# Patient Record
Sex: FEMALE | Race: WHITE | NOT HISPANIC OR LATINO | Employment: OTHER | URBAN - NONMETROPOLITAN AREA
[De-identification: names, ages, dates, MRNs, and addresses within clinical notes are randomized per-mention and may not be internally consistent; named-entity substitution may affect disease eponyms.]

---

## 2023-03-09 LAB — COBALAMIN (VITAMIN B12) (PG/ML) IN SER/PLAS: 798 PG/ML (ref 211–911)

## 2023-06-13 LAB
ALANINE AMINOTRANSFERASE (SGPT) (U/L) IN SER/PLAS: 22 U/L (ref 7–45)
ALBUMIN (G/DL) IN SER/PLAS: 4.1 G/DL (ref 3.4–5)
ALKALINE PHOSPHATASE (U/L) IN SER/PLAS: 68 U/L (ref 33–136)
ANION GAP IN SER/PLAS: 13 MMOL/L (ref 10–20)
ASPARTATE AMINOTRANSFERASE (SGOT) (U/L) IN SER/PLAS: 18 U/L (ref 9–39)
BASOPHILS (10*3/UL) IN BLOOD BY AUTOMATED COUNT: 0.08 X10E9/L (ref 0–0.1)
BASOPHILS/100 LEUKOCYTES IN BLOOD BY AUTOMATED COUNT: 1.1 % (ref 0–2)
BILIRUBIN TOTAL (MG/DL) IN SER/PLAS: 1.4 MG/DL (ref 0–1.2)
CALCIUM (MG/DL) IN SER/PLAS: 9.4 MG/DL (ref 8.6–10.3)
CARBON DIOXIDE, TOTAL (MMOL/L) IN SER/PLAS: 25 MMOL/L (ref 21–32)
CHLORIDE (MMOL/L) IN SER/PLAS: 100 MMOL/L (ref 98–107)
CREATININE (MG/DL) IN SER/PLAS: 0.91 MG/DL (ref 0.5–1.05)
EOSINOPHILS (10*3/UL) IN BLOOD BY AUTOMATED COUNT: 0.18 X10E9/L (ref 0–0.4)
EOSINOPHILS/100 LEUKOCYTES IN BLOOD BY AUTOMATED COUNT: 2.6 % (ref 0–6)
ERYTHROCYTE DISTRIBUTION WIDTH (RATIO) BY AUTOMATED COUNT: 12.4 % (ref 11.5–14.5)
ERYTHROCYTE MEAN CORPUSCULAR HEMOGLOBIN CONCENTRATION (G/DL) BY AUTOMATED: 33.3 G/DL (ref 32–36)
ERYTHROCYTE MEAN CORPUSCULAR VOLUME (FL) BY AUTOMATED COUNT: 97 FL (ref 80–100)
ERYTHROCYTES (10*6/UL) IN BLOOD BY AUTOMATED COUNT: 5.09 X10E12/L (ref 4–5.2)
GFR FEMALE: 65 ML/MIN/1.73M2
GLUCOSE (MG/DL) IN SER/PLAS: 131 MG/DL (ref 74–99)
HEMATOCRIT (%) IN BLOOD BY AUTOMATED COUNT: 49.2 % (ref 36–46)
HEMOGLOBIN (G/DL) IN BLOOD: 16.4 G/DL (ref 12–16)
IMMATURE GRANULOCYTES/100 LEUKOCYTES IN BLOOD BY AUTOMATED COUNT: 0.4 % (ref 0–0.9)
LEUKOCYTES (10*3/UL) IN BLOOD BY AUTOMATED COUNT: 7 X10E9/L (ref 4.4–11.3)
LYMPHOCYTES (10*3/UL) IN BLOOD BY AUTOMATED COUNT: 3.02 X10E9/L (ref 0.8–3)
LYMPHOCYTES/100 LEUKOCYTES IN BLOOD BY AUTOMATED COUNT: 43.3 % (ref 13–44)
MONOCYTES (10*3/UL) IN BLOOD BY AUTOMATED COUNT: 0.59 X10E9/L (ref 0.05–0.8)
MONOCYTES/100 LEUKOCYTES IN BLOOD BY AUTOMATED COUNT: 8.5 % (ref 2–10)
NEUTROPHILS (10*3/UL) IN BLOOD BY AUTOMATED COUNT: 3.08 X10E9/L (ref 1.6–5.5)
NEUTROPHILS/100 LEUKOCYTES IN BLOOD BY AUTOMATED COUNT: 44.1 % (ref 40–80)
PLATELETS (10*3/UL) IN BLOOD AUTOMATED COUNT: 260 X10E9/L (ref 150–450)
POTASSIUM (MMOL/L) IN SER/PLAS: 4.2 MMOL/L (ref 3.5–5.3)
PROTEIN TOTAL: 6.8 G/DL (ref 6.4–8.2)
SODIUM (MMOL/L) IN SER/PLAS: 134 MMOL/L (ref 136–145)
UREA NITROGEN (MG/DL) IN SER/PLAS: 15 MG/DL (ref 6–23)

## 2023-07-05 ENCOUNTER — TELEPHONE (OUTPATIENT)
Dept: PRIMARY CARE | Facility: CLINIC | Age: 78
End: 2023-07-05
Payer: MEDICARE

## 2023-07-05 DIAGNOSIS — G62.89 OTHER POLYNEUROPATHY: Primary | ICD-10-CM

## 2023-07-06 PROBLEM — G62.9 PERIPHERAL NEUROPATHY: Status: ACTIVE | Noted: 2023-07-06

## 2023-07-30 DIAGNOSIS — E11.65 TYPE 2 DIABETES MELLITUS WITH HYPERGLYCEMIA (MULTI): ICD-10-CM

## 2023-07-30 DIAGNOSIS — I10 ESSENTIAL (PRIMARY) HYPERTENSION: ICD-10-CM

## 2023-07-31 LAB — URINE CULTURE: ABNORMAL

## 2023-08-01 RX ORDER — METFORMIN HYDROCHLORIDE 500 MG/1
500 TABLET ORAL DAILY
Qty: 90 TABLET | Refills: 1 | Status: SHIPPED | OUTPATIENT
Start: 2023-08-01 | End: 2024-02-05

## 2023-08-01 RX ORDER — LOSARTAN POTASSIUM AND HYDROCHLOROTHIAZIDE 12.5; 5 MG/1; MG/1
1 TABLET ORAL DAILY
Qty: 90 TABLET | Refills: 1 | Status: SHIPPED | OUTPATIENT
Start: 2023-08-01 | End: 2023-08-02 | Stop reason: SDUPTHER

## 2023-08-02 RX ORDER — LOSARTAN POTASSIUM AND HYDROCHLOROTHIAZIDE 12.5; 5 MG/1; MG/1
1 TABLET ORAL DAILY
Qty: 90 TABLET | Refills: 1 | Status: SHIPPED | OUTPATIENT
Start: 2023-08-02 | End: 2024-01-05 | Stop reason: HOSPADM

## 2023-08-11 LAB
ALANINE AMINOTRANSFERASE (SGPT) (U/L) IN SER/PLAS: 23 U/L (ref 7–45)
ALBUMIN (G/DL) IN SER/PLAS: 4.1 G/DL (ref 3.4–5)
ALKALINE PHOSPHATASE (U/L) IN SER/PLAS: 68 U/L (ref 33–136)
ANION GAP IN SER/PLAS: 15 MMOL/L (ref 10–20)
ASPARTATE AMINOTRANSFERASE (SGOT) (U/L) IN SER/PLAS: 21 U/L (ref 9–39)
BASOPHILS (10*3/UL) IN BLOOD BY AUTOMATED COUNT: 0.07 X10E9/L (ref 0–0.1)
BASOPHILS/100 LEUKOCYTES IN BLOOD BY AUTOMATED COUNT: 1 % (ref 0–2)
BILIRUBIN TOTAL (MG/DL) IN SER/PLAS: 1.8 MG/DL (ref 0–1.2)
CALCIUM (MG/DL) IN SER/PLAS: 9.7 MG/DL (ref 8.6–10.3)
CARBON DIOXIDE, TOTAL (MMOL/L) IN SER/PLAS: 27 MMOL/L (ref 21–32)
CHLORIDE (MMOL/L) IN SER/PLAS: 99 MMOL/L (ref 98–107)
CHOLESTEROL (MG/DL) IN SER/PLAS: 225 MG/DL (ref 0–199)
CHOLESTEROL IN HDL (MG/DL) IN SER/PLAS: 69 MG/DL
CHOLESTEROL/HDL RATIO: 3.3
COBALAMIN (VITAMIN B12) (PG/ML) IN SER/PLAS: 996 PG/ML (ref 211–911)
CREATININE (MG/DL) IN SER/PLAS: 0.89 MG/DL (ref 0.5–1.05)
EOSINOPHILS (10*3/UL) IN BLOOD BY AUTOMATED COUNT: 0.17 X10E9/L (ref 0–0.4)
EOSINOPHILS/100 LEUKOCYTES IN BLOOD BY AUTOMATED COUNT: 2.5 % (ref 0–6)
ERYTHROCYTE DISTRIBUTION WIDTH (RATIO) BY AUTOMATED COUNT: 12.6 % (ref 11.5–14.5)
ERYTHROCYTE MEAN CORPUSCULAR HEMOGLOBIN CONCENTRATION (G/DL) BY AUTOMATED: 33.1 G/DL (ref 32–36)
ERYTHROCYTE MEAN CORPUSCULAR VOLUME (FL) BY AUTOMATED COUNT: 97 FL (ref 80–100)
ERYTHROCYTES (10*6/UL) IN BLOOD BY AUTOMATED COUNT: 5.25 X10E12/L (ref 4–5.2)
ESTIMATED AVERAGE GLUCOSE FOR HBA1C: 154 MG/DL
GFR FEMALE: 67 ML/MIN/1.73M2
GLUCOSE (MG/DL) IN SER/PLAS: 141 MG/DL (ref 74–99)
HEMATOCRIT (%) IN BLOOD BY AUTOMATED COUNT: 51.1 % (ref 36–46)
HEMOGLOBIN (G/DL) IN BLOOD: 16.9 G/DL (ref 12–16)
HEMOGLOBIN A1C/HEMOGLOBIN TOTAL IN BLOOD: 7 %
IMMATURE GRANULOCYTES/100 LEUKOCYTES IN BLOOD BY AUTOMATED COUNT: 0.3 % (ref 0–0.9)
LDL: 129 MG/DL (ref 0–99)
LEUKOCYTES (10*3/UL) IN BLOOD BY AUTOMATED COUNT: 6.8 X10E9/L (ref 4.4–11.3)
LYMPHOCYTES (10*3/UL) IN BLOOD BY AUTOMATED COUNT: 2.67 X10E9/L (ref 0.8–3)
LYMPHOCYTES/100 LEUKOCYTES IN BLOOD BY AUTOMATED COUNT: 39 % (ref 13–44)
MONOCYTES (10*3/UL) IN BLOOD BY AUTOMATED COUNT: 0.57 X10E9/L (ref 0.05–0.8)
MONOCYTES/100 LEUKOCYTES IN BLOOD BY AUTOMATED COUNT: 8.3 % (ref 2–10)
NEUTROPHILS (10*3/UL) IN BLOOD BY AUTOMATED COUNT: 3.34 X10E9/L (ref 1.6–5.5)
NEUTROPHILS/100 LEUKOCYTES IN BLOOD BY AUTOMATED COUNT: 48.9 % (ref 40–80)
PLATELETS (10*3/UL) IN BLOOD AUTOMATED COUNT: 241 X10E9/L (ref 150–450)
POTASSIUM (MMOL/L) IN SER/PLAS: 4 MMOL/L (ref 3.5–5.3)
PROTEIN TOTAL: 7.3 G/DL (ref 6.4–8.2)
SODIUM (MMOL/L) IN SER/PLAS: 137 MMOL/L (ref 136–145)
THYROTROPIN (MIU/L) IN SER/PLAS BY DETECTION LIMIT <= 0.05 MIU/L: 1.98 MIU/L (ref 0.44–3.98)
TRIGLYCERIDE (MG/DL) IN SER/PLAS: 136 MG/DL (ref 0–149)
UREA NITROGEN (MG/DL) IN SER/PLAS: 15 MG/DL (ref 6–23)
VLDL: 27 MG/DL (ref 0–40)

## 2023-08-17 ENCOUNTER — OFFICE VISIT (OUTPATIENT)
Dept: PRIMARY CARE | Facility: CLINIC | Age: 78
End: 2023-08-17
Payer: MEDICARE

## 2023-08-17 VITALS
DIASTOLIC BLOOD PRESSURE: 80 MMHG | HEIGHT: 62 IN | WEIGHT: 209.1 LBS | SYSTOLIC BLOOD PRESSURE: 130 MMHG | BODY MASS INDEX: 38.48 KG/M2 | HEART RATE: 88 BPM

## 2023-08-17 DIAGNOSIS — R79.89 LOW VITAMIN D LEVEL: ICD-10-CM

## 2023-08-17 DIAGNOSIS — E78.5 DYSLIPIDEMIA: ICD-10-CM

## 2023-08-17 DIAGNOSIS — E11.65 TYPE 2 DIABETES MELLITUS WITH HYPERGLYCEMIA, WITHOUT LONG-TERM CURRENT USE OF INSULIN (MULTI): Primary | ICD-10-CM

## 2023-08-17 DIAGNOSIS — Z28.21 HERPES ZOSTER VACCINATION DECLINED: ICD-10-CM

## 2023-08-17 DIAGNOSIS — I48.0 PAROXYSMAL ATRIAL FIBRILLATION (MULTI): ICD-10-CM

## 2023-08-17 DIAGNOSIS — I10 BENIGN HYPERTENSION: ICD-10-CM

## 2023-08-17 DIAGNOSIS — Z28.21 PNEUMOCOCCAL VACCINATION DECLINED: ICD-10-CM

## 2023-08-17 DIAGNOSIS — E66.01 SEVERE OBESITY (BMI 35.0-39.9) WITH COMORBIDITY (MULTI): ICD-10-CM

## 2023-08-17 PROBLEM — G47.30 SEVERE SLEEP APNEA: Status: ACTIVE | Noted: 2023-08-17

## 2023-08-17 PROBLEM — N39.3 SUI (STRESS URINARY INCONTINENCE, FEMALE): Status: ACTIVE | Noted: 2023-08-17

## 2023-08-17 PROBLEM — H35.379 MACULAR PUCKERING OF RETINA: Status: ACTIVE | Noted: 2023-08-17

## 2023-08-17 PROBLEM — N39.0 RECURRENT UTI: Status: ACTIVE | Noted: 2023-08-17

## 2023-08-17 PROCEDURE — 99214 OFFICE O/P EST MOD 30 MIN: CPT | Performed by: FAMILY MEDICINE

## 2023-08-17 PROCEDURE — 1160F RVW MEDS BY RX/DR IN RCRD: CPT | Performed by: FAMILY MEDICINE

## 2023-08-17 PROCEDURE — 3075F SYST BP GE 130 - 139MM HG: CPT | Performed by: FAMILY MEDICINE

## 2023-08-17 PROCEDURE — 1036F TOBACCO NON-USER: CPT | Performed by: FAMILY MEDICINE

## 2023-08-17 PROCEDURE — 3079F DIAST BP 80-89 MM HG: CPT | Performed by: FAMILY MEDICINE

## 2023-08-17 PROCEDURE — 1159F MED LIST DOCD IN RCRD: CPT | Performed by: FAMILY MEDICINE

## 2023-08-17 RX ORDER — CHOLECALCIFEROL (VITAMIN D3) 25 MCG
1000 TABLET ORAL NIGHTLY
COMMUNITY

## 2023-08-17 RX ORDER — LANOLIN ALCOHOL/MO/W.PET/CERES
1 CREAM (GRAM) TOPICAL DAILY
COMMUNITY

## 2023-08-17 RX ORDER — METOPROLOL TARTRATE 50 MG/1
TABLET ORAL
COMMUNITY
End: 2024-01-02 | Stop reason: SDUPTHER

## 2023-08-17 NOTE — PROGRESS NOTES
Patient presents for periodic surveillance of chronic medical problems.     Subjective  Graciela Norman is a 78 y.o. female who presents for Annual Exam.  HPI  DM, well controlled, A1c is 7  Htn, stable  Hyperlipidemia, stopped her statin, reviewed benefits, she will get back on it  Due for colonoscopy, history of colon cancer, she states she is aware is due, she wants to call the office herself to schedule, asked her to let us know if they require a referarl  Recommend prevnar 20, Recommend Shingrix, declines  Elevated h/h, hem/onc is watching per pt    Review of Systems   All other systems reviewed and are negative.  .    Objective     Visit Vitals  /80 (BP Location: Left arm, Patient Position: Sitting)   Pulse 88      Physical Exam  Vitals and nursing note reviewed.   Constitutional:       General: She is not in acute distress.     Appearance: Normal appearance. She is not toxic-appearing.   HENT:      Head: Normocephalic and atraumatic.   Cardiovascular:      Rate and Rhythm: Normal rate and regular rhythm.      Heart sounds: No murmur heard.  Pulmonary:      Effort: Pulmonary effort is normal.      Breath sounds: Normal breath sounds.   Abdominal:      Palpations: Abdomen is soft.   Musculoskeletal:      Cervical back: Neck supple. No rigidity.      Comments: Normal gait   Skin:     General: Skin is warm and dry.   Neurological:      General: No focal deficit present.      Mental Status: She is alert and oriented to person, place, and time.   Psychiatric:         Mood and Affect: Mood normal.         Behavior: Behavior normal.       Orders Only on 08/11/2023   Component Date Value Ref Range Status    Hemoglobin A1C 08/11/2023 7.0 (A)  % Final    Comment:      Diagnosis of Diabetes-Adults   Non-Diabetic: < or = 5.6%   Increased risk for developing diabetes: 5.7-6.4%   Diagnostic of diabetes: > or = 6.5%  .       Monitoring of Diabetes                Age (y)     Therapeutic Goal (%)   Adults:          >18            <7.0   Pediatrics:    13-18           <7.5                   7-12           <8.0                   0- 6            7.5-8.5   American Diabetes Association. Diabetes Care 33(S1), Jan 2010.      Estimated Average Glucose 08/11/2023 154  MG/DL Final    Cholesterol 08/11/2023 225 (H)  0 - 199 mg/dL Final    Comment: .      AGE      DESIRABLE   BORDERLINE HIGH   HIGH     0-19 Y     0 - 169       170 - 199     >/= 200    20-24 Y     0 - 189       190 - 224     >/= 225         >24 Y     0 - 199       200 - 239     >/= 240   **All ranges are based on fasting samples. Specific   therapeutic targets will vary based on patient-specific   cardiac risk.  .   Pediatric guidelines reference:Pediatrics 2011, 128(S5).   Adult guidelines reference: NCEP ATPIII Guidelines,     DINA 2001, 258:2486-97  .   Venipuncture immediately after or during the    administration of Metamizole may lead to falsely   low results. Testing should be performed immediately   prior to Metamizole dosing.      HDL 08/11/2023 69.0  mg/dL Final    Comment: .      AGE      VERY LOW   LOW     NORMAL    HIGH       0-19 Y       < 35   < 40     40-45     ----    20-24 Y       ----   < 40       >45     ----      >24 Y       ----   < 40     40-60      >60  .      Cholesterol/HDL Ratio 08/11/2023 3.3   Final    Comment: REF VALUES  DESIRABLE  < 3.4  HIGH RISK  > 5.0      LDL 08/11/2023 129 (H)  0 - 99 mg/dL Final    Comment: .                           NEAR      BORD      AGE      DESIRABLE  OPTIMAL    HIGH     HIGH     VERY HIGH     0-19 Y     0 - 109     ---    110-129   >/= 130     ----    20-24 Y     0 - 119     ---    120-159   >/= 160     ----      >24 Y     0 -  99   100-129  130-159   160-189     >/=190  .      VLDL 08/11/2023 27  0 - 40 mg/dL Final    Triglycerides 08/11/2023 136  0 - 149 mg/dL Final    Comment: .      AGE      DESIRABLE   BORDERLINE HIGH   HIGH     VERY HIGH   0 D-90 D    19 - 174         ----         ----        ----  91 D- 9 Y      0 -  74        75 -  99     >/= 100      ----    10-19 Y     0 -  89        90 - 129     >/= 130      ----    20-24 Y     0 - 114       115 - 149     >/= 150      ----         >24 Y     0 - 149       150 - 199    200- 499    >/= 500  .   Venipuncture immediately after or during the    administration of Metamizole may lead to falsely   low results. Testing should be performed immediately   prior to Metamizole dosing.      WBC 08/11/2023 6.8  4.4 - 11.3 x10E9/L Final    RBC 08/11/2023 5.25 (H)  4.00 - 5.20 x10E12/L Final    Hemoglobin 08/11/2023 16.9 (H)  12.0 - 16.0 g/dL Final    Hematocrit 08/11/2023 51.1 (H)  36.0 - 46.0 % Final    MCV 08/11/2023 97  80 - 100 fL Final    MCHC 08/11/2023 33.1  32.0 - 36.0 g/dL Final    Platelets 08/11/2023 241  150 - 450 x10E9/L Final    RDW 08/11/2023 12.6  11.5 - 14.5 % Final    Neutrophils % 08/11/2023 48.9  40.0 - 80.0 % Final    Immature Granulocytes %, Automated 08/11/2023 0.3  0.0 - 0.9 % Final    Comment:  Immature Granulocyte Count (IG) includes promyelocytes,    myelocytes and metamyelocytes but does not include bands.   Percent differential counts (%) should be interpreted in the   context of the absolute cell counts (cells/L).      Lymphocytes % 08/11/2023 39.0  13.0 - 44.0 % Final    Monocytes % 08/11/2023 8.3  2.0 - 10.0 % Final    Eosinophils % 08/11/2023 2.5  0.0 - 6.0 % Final    Basophils % 08/11/2023 1.0  0.0 - 2.0 % Final    Neutrophils Absolute 08/11/2023 3.34  1.60 - 5.50 x10E9/L Final    Comment:  Percent differential counts (%) should be interpreted in the   context of the absolute cell counts (cells/L).      Lymphocytes Absolute 08/11/2023 2.67  0.80 - 3.00 x10E9/L Final    Monocytes Absolute 08/11/2023 0.57  0.05 - 0.80 x10E9/L Final    Eosinophils Absolute 08/11/2023 0.17  0.00 - 0.40 x10E9/L Final    Basophils Absolute 08/11/2023 0.07  0.00 - 0.10 x10E9/L Final    Glucose 08/11/2023 141 (H)  74 - 99 mg/dL Final    Sodium 08/11/2023 137  136 - 145 mmol/L  Final    Potassium 08/11/2023 4.0  3.5 - 5.3 mmol/L Final    Chloride 08/11/2023 99  98 - 107 mmol/L Final    Bicarbonate 08/11/2023 27  21 - 32 mmol/L Final    Anion Gap 08/11/2023 15  10 - 20 mmol/L Final    Urea Nitrogen 08/11/2023 15  6 - 23 mg/dL Final    Creatinine 08/11/2023 0.89  0.50 - 1.05 mg/dL Final    GFR Female 08/11/2023 67  >90 mL/min/1.73m2 Final    Comment:  CALCULATIONS OF ESTIMATED GFR ARE PERFORMED   USING THE 2021 CKD-EPI STUDY REFIT EQUATION   WITHOUT THE RACE VARIABLE FOR THE IDMS-TRACEABLE   CREATININE METHODS.    https://jasn.asnjournals.org/content/early/2021/09/22/ASN.3908960952      Calcium 08/11/2023 9.7  8.6 - 10.3 mg/dL Final    Albumin 08/11/2023 4.1  3.4 - 5.0 g/dL Final    Alkaline Phosphatase 08/11/2023 68  33 - 136 U/L Final    Total Protein 08/11/2023 7.3  6.4 - 8.2 g/dL Final    AST 08/11/2023 21  9 - 39 U/L Final    Total Bilirubin 08/11/2023 1.8 (H)  0.0 - 1.2 mg/dL Final    ALT (SGPT) 08/11/2023 23  7 - 45 U/L Final    Comment:  Patients treated with Sulfasalazine may generate    falsely decreased results for ALT.      TSH 08/11/2023 1.98  0.44 - 3.98 mIU/L Final    Comment:  TSH testing is performed using different testing    methodology at [facility] than at other    [facility] hospitals. Direct result comparisons should    only be made within the same method.      Vitamin B-12 08/11/2023 996 (H)  211 - 911 pg/mL Final   Orders Only on 07/28/2023   Component Date Value Ref Range Status    Urine Culture 07/28/2023 Escherichia coli (A)   Final    >100,000 CFU/ML        Assessment/Plan   Problem List Items Addressed This Visit       Diabetes mellitus with hyperglycemia, without long-term current use of insulin (CMS/[facility]) - Primary    Relevant Orders    Comprehensive Metabolic Panel    Albumin , Urine Random    Urinalysis Microscopic Only    Hemoglobin A1C    Paroxysmal atrial fibrillation (CMS/HCC)    Relevant Medications    metoprolol tartrate (Lopressor) 50 mg  tablet    Severe obesity (BMI 35.0-39.9) with comorbidity (CMS/HCC)    Benign hypertension    Dyslipidemia    Low vitamin D level    Relevant Orders    Vitamin D 25-Hydroxy,Total     Other Visit Diagnoses       BMI 38.0-38.9,adult        Relevant Orders    Vitamin D 25-Hydroxy,Total                   Becky Gusman MD

## 2024-01-02 ENCOUNTER — OFFICE VISIT (OUTPATIENT)
Dept: PRIMARY CARE | Facility: CLINIC | Age: 79
End: 2024-01-02
Payer: MEDICARE

## 2024-01-02 ENCOUNTER — HOSPITAL ENCOUNTER (INPATIENT)
Facility: HOSPITAL | Age: 79
LOS: 3 days | Discharge: HOME | DRG: 309 | End: 2024-01-05
Attending: EMERGENCY MEDICINE | Admitting: INTERNAL MEDICINE
Payer: MEDICARE

## 2024-01-02 ENCOUNTER — APPOINTMENT (OUTPATIENT)
Dept: RADIOLOGY | Facility: HOSPITAL | Age: 79
DRG: 309 | End: 2024-01-02
Payer: MEDICARE

## 2024-01-02 ENCOUNTER — HOSPITAL ENCOUNTER (OUTPATIENT)
Dept: CARDIOLOGY | Facility: HOSPITAL | Age: 79
Discharge: HOME | DRG: 309 | End: 2024-01-02
Payer: MEDICARE

## 2024-01-02 VITALS
BODY MASS INDEX: 39.01 KG/M2 | WEIGHT: 212 LBS | SYSTOLIC BLOOD PRESSURE: 144 MMHG | DIASTOLIC BLOOD PRESSURE: 88 MMHG | HEIGHT: 62 IN | HEART RATE: 88 BPM

## 2024-01-02 DIAGNOSIS — E66.01 SEVERE OBESITY (BMI 35.0-39.9) WITH COMORBIDITY (MULTI): ICD-10-CM

## 2024-01-02 DIAGNOSIS — I48.0 PAROXYSMAL ATRIAL FIBRILLATION (MULTI): ICD-10-CM

## 2024-01-02 DIAGNOSIS — E11.65 TYPE 2 DIABETES MELLITUS WITH HYPERGLYCEMIA, WITHOUT LONG-TERM CURRENT USE OF INSULIN (MULTI): ICD-10-CM

## 2024-01-02 DIAGNOSIS — G62.2 POLYNEUROPATHY DUE TO OTHER TOXIC AGENTS (MULTI): Primary | ICD-10-CM

## 2024-01-02 DIAGNOSIS — I48.91 ATRIAL FIBRILLATION WITH RVR (MULTI): Primary | ICD-10-CM

## 2024-01-02 DIAGNOSIS — I50.20 HEART FAILURE WITH REDUCED EJECTION FRACTION (MULTI): ICD-10-CM

## 2024-01-02 DIAGNOSIS — Z91.199 NONCOMPLIANCE: ICD-10-CM

## 2024-01-02 LAB
ANION GAP SERPL CALC-SCNC: 16 MMOL/L (ref 10–20)
ATRIAL RATE: 170 BPM
BASOPHILS # BLD AUTO: 0.07 X10*3/UL (ref 0–0.1)
BASOPHILS NFR BLD AUTO: 0.9 %
BNP SERPL-MCNC: 823 PG/ML (ref 0–99)
BUN SERPL-MCNC: 14 MG/DL (ref 6–23)
CALCIUM SERPL-MCNC: 9.7 MG/DL (ref 8.6–10.3)
CARDIAC TROPONIN I PNL SERPL HS: 33 NG/L (ref 0–13)
CHLORIDE SERPL-SCNC: 102 MMOL/L (ref 98–107)
CO2 SERPL-SCNC: 23 MMOL/L (ref 21–32)
CREAT SERPL-MCNC: 0.92 MG/DL (ref 0.5–1.05)
EOSINOPHIL # BLD AUTO: 0.06 X10*3/UL (ref 0–0.4)
EOSINOPHIL NFR BLD AUTO: 0.8 %
ERYTHROCYTE [DISTWIDTH] IN BLOOD BY AUTOMATED COUNT: 13 % (ref 11.5–14.5)
GFR SERPL CREATININE-BSD FRML MDRD: 65 ML/MIN/1.73M*2
GLUCOSE BLD MANUAL STRIP-MCNC: 144 MG/DL (ref 74–99)
GLUCOSE BLD MANUAL STRIP-MCNC: 183 MG/DL (ref 74–99)
GLUCOSE SERPL-MCNC: 178 MG/DL (ref 74–99)
HCT VFR BLD AUTO: 49.5 % (ref 36–46)
HGB BLD-MCNC: 15.9 G/DL (ref 12–16)
HOLD SPECIMEN: NORMAL
IMM GRANULOCYTES # BLD AUTO: 0.02 X10*3/UL (ref 0–0.5)
IMM GRANULOCYTES NFR BLD AUTO: 0.3 % (ref 0–0.9)
LYMPHOCYTES # BLD AUTO: 1.93 X10*3/UL (ref 0.8–3)
LYMPHOCYTES NFR BLD AUTO: 25.8 %
MAGNESIUM SERPL-MCNC: 1.81 MG/DL (ref 1.6–2.4)
MCH RBC QN AUTO: 31.5 PG (ref 26–34)
MCHC RBC AUTO-ENTMCNC: 32.1 G/DL (ref 32–36)
MCV RBC AUTO: 98 FL (ref 80–100)
MONOCYTES # BLD AUTO: 0.68 X10*3/UL (ref 0.05–0.8)
MONOCYTES NFR BLD AUTO: 9.1 %
NEUTROPHILS # BLD AUTO: 4.71 X10*3/UL (ref 1.6–5.5)
NEUTROPHILS NFR BLD AUTO: 63.1 %
NRBC BLD-RTO: 0 /100 WBCS (ref 0–0)
PHOSPHATE SERPL-MCNC: 3.3 MG/DL (ref 2.5–4.9)
PLATELET # BLD AUTO: 221 X10*3/UL (ref 150–450)
POTASSIUM SERPL-SCNC: 3.8 MMOL/L (ref 3.5–5.3)
Q ONSET: 228 MS
QRS COUNT: 28 BEATS
QRS DURATION: 72 MS
QT INTERVAL: 228 MS
QTC CALCULATION(BAZETT): 381 MS
QTC FREDERICIA: 321 MS
R AXIS: 44 DEGREES
RBC # BLD AUTO: 5.05 X10*6/UL (ref 4–5.2)
SODIUM SERPL-SCNC: 137 MMOL/L (ref 136–145)
T AXIS: 189 DEGREES
T OFFSET: 342 MS
TSH SERPL-ACNC: 2.03 MIU/L (ref 0.44–3.98)
VENTRICULAR RATE: 168 BPM
WBC # BLD AUTO: 7.5 X10*3/UL (ref 4.4–11.3)

## 2024-01-02 PROCEDURE — 99214 OFFICE O/P EST MOD 30 MIN: CPT | Performed by: FAMILY MEDICINE

## 2024-01-02 PROCEDURE — 1126F AMNT PAIN NOTED NONE PRSNT: CPT | Performed by: FAMILY MEDICINE

## 2024-01-02 PROCEDURE — 2500000005 HC RX 250 GENERAL PHARMACY W/O HCPCS: Performed by: EMERGENCY MEDICINE

## 2024-01-02 PROCEDURE — 2500000004 HC RX 250 GENERAL PHARMACY W/ HCPCS (ALT 636 FOR OP/ED): Performed by: EMERGENCY MEDICINE

## 2024-01-02 PROCEDURE — 3079F DIAST BP 80-89 MM HG: CPT | Performed by: FAMILY MEDICINE

## 2024-01-02 PROCEDURE — 82947 ASSAY GLUCOSE BLOOD QUANT: CPT

## 2024-01-02 PROCEDURE — 3077F SYST BP >= 140 MM HG: CPT | Performed by: FAMILY MEDICINE

## 2024-01-02 PROCEDURE — 2500000001 HC RX 250 WO HCPCS SELF ADMINISTERED DRUGS (ALT 637 FOR MEDICARE OP): Performed by: INTERNAL MEDICINE

## 2024-01-02 PROCEDURE — 93010 ELECTROCARDIOGRAM REPORT: CPT | Performed by: STUDENT IN AN ORGANIZED HEALTH CARE EDUCATION/TRAINING PROGRAM

## 2024-01-02 PROCEDURE — 96374 THER/PROPH/DIAG INJ IV PUSH: CPT

## 2024-01-02 PROCEDURE — 1160F RVW MEDS BY RX/DR IN RCRD: CPT | Performed by: FAMILY MEDICINE

## 2024-01-02 PROCEDURE — 1159F MED LIST DOCD IN RCRD: CPT | Performed by: FAMILY MEDICINE

## 2024-01-02 PROCEDURE — 84443 ASSAY THYROID STIM HORMONE: CPT | Performed by: EMERGENCY MEDICINE

## 2024-01-02 PROCEDURE — 85025 COMPLETE CBC W/AUTO DIFF WBC: CPT | Performed by: EMERGENCY MEDICINE

## 2024-01-02 PROCEDURE — 83735 ASSAY OF MAGNESIUM: CPT | Performed by: EMERGENCY MEDICINE

## 2024-01-02 PROCEDURE — 36415 COLL VENOUS BLD VENIPUNCTURE: CPT | Performed by: EMERGENCY MEDICINE

## 2024-01-02 PROCEDURE — 93005 ELECTROCARDIOGRAM TRACING: CPT

## 2024-01-02 PROCEDURE — 1036F TOBACCO NON-USER: CPT | Performed by: FAMILY MEDICINE

## 2024-01-02 PROCEDURE — 99291 CRITICAL CARE FIRST HOUR: CPT | Mod: 25,27 | Performed by: EMERGENCY MEDICINE

## 2024-01-02 PROCEDURE — 2500000001 HC RX 250 WO HCPCS SELF ADMINISTERED DRUGS (ALT 637 FOR MEDICARE OP): Performed by: EMERGENCY MEDICINE

## 2024-01-02 PROCEDURE — 2500000005 HC RX 250 GENERAL PHARMACY W/O HCPCS

## 2024-01-02 PROCEDURE — 99223 1ST HOSP IP/OBS HIGH 75: CPT | Performed by: INTERNAL MEDICINE

## 2024-01-02 PROCEDURE — 2020000001 HC ICU ROOM DAILY

## 2024-01-02 PROCEDURE — 96376 TX/PRO/DX INJ SAME DRUG ADON: CPT

## 2024-01-02 PROCEDURE — 84100 ASSAY OF PHOSPHORUS: CPT | Performed by: EMERGENCY MEDICINE

## 2024-01-02 PROCEDURE — 84484 ASSAY OF TROPONIN QUANT: CPT | Performed by: EMERGENCY MEDICINE

## 2024-01-02 PROCEDURE — 71045 X-RAY EXAM CHEST 1 VIEW: CPT | Mod: FR

## 2024-01-02 PROCEDURE — 80048 BASIC METABOLIC PNL TOTAL CA: CPT | Performed by: EMERGENCY MEDICINE

## 2024-01-02 PROCEDURE — 83880 ASSAY OF NATRIURETIC PEPTIDE: CPT | Performed by: EMERGENCY MEDICINE

## 2024-01-02 PROCEDURE — 71045 X-RAY EXAM CHEST 1 VIEW: CPT | Mod: FOREIGN READ | Performed by: RADIOLOGY

## 2024-01-02 RX ORDER — DILTIAZEM HYDROCHLORIDE 5 MG/ML
INJECTION INTRAVENOUS
Status: COMPLETED
Start: 2024-01-02 | End: 2024-01-02

## 2024-01-02 RX ORDER — METOPROLOL TARTRATE 25 MG/1
25 TABLET, FILM COATED ORAL 2 TIMES DAILY
Qty: 60 TABLET | Refills: 2 | Status: SHIPPED | OUTPATIENT
Start: 2024-01-02 | End: 2024-04-01

## 2024-01-02 RX ORDER — CHOLECALCIFEROL (VITAMIN D3) 25 MCG
1000 TABLET ORAL NIGHTLY
Status: DISCONTINUED | OUTPATIENT
Start: 2024-01-02 | End: 2024-01-05 | Stop reason: HOSPADM

## 2024-01-02 RX ORDER — LOSARTAN POTASSIUM 50 MG/1
50 TABLET ORAL DAILY
Status: DISCONTINUED | OUTPATIENT
Start: 2024-01-02 | End: 2024-01-04

## 2024-01-02 RX ORDER — INSULIN LISPRO 100 [IU]/ML
0-10 INJECTION, SOLUTION INTRAVENOUS; SUBCUTANEOUS
Status: DISCONTINUED | OUTPATIENT
Start: 2024-01-02 | End: 2024-01-05 | Stop reason: HOSPADM

## 2024-01-02 RX ORDER — DILTIAZEM HYDROCHLORIDE 5 MG/ML
20 INJECTION INTRAVENOUS ONCE
Status: COMPLETED | OUTPATIENT
Start: 2024-01-02 | End: 2024-01-02

## 2024-01-02 RX ORDER — ATORVASTATIN CALCIUM 20 MG/1
20 TABLET, FILM COATED ORAL DAILY
Status: DISCONTINUED | OUTPATIENT
Start: 2024-01-02 | End: 2024-01-05 | Stop reason: HOSPADM

## 2024-01-02 RX ORDER — DEXTROSE MONOHYDRATE 100 MG/ML
0.3 INJECTION, SOLUTION INTRAVENOUS ONCE AS NEEDED
Status: DISCONTINUED | OUTPATIENT
Start: 2024-01-02 | End: 2024-01-05 | Stop reason: HOSPADM

## 2024-01-02 RX ORDER — LANOLIN ALCOHOL/MO/W.PET/CERES
1000 CREAM (GRAM) TOPICAL DAILY
Status: DISCONTINUED | OUTPATIENT
Start: 2024-01-02 | End: 2024-01-05 | Stop reason: HOSPADM

## 2024-01-02 RX ORDER — ACETAMINOPHEN 325 MG/1
1000 TABLET ORAL NIGHTLY
Status: DISCONTINUED | OUTPATIENT
Start: 2024-01-02 | End: 2024-01-05 | Stop reason: HOSPADM

## 2024-01-02 RX ORDER — DILTIAZEM HCL/D5W 125 MG/125
5-15 PLASTIC BAG, INJECTION (ML) INTRAVENOUS CONTINUOUS
Status: DISCONTINUED | OUTPATIENT
Start: 2024-01-02 | End: 2024-01-03

## 2024-01-02 RX ORDER — METOPROLOL TARTRATE 25 MG/1
25 TABLET, FILM COATED ORAL 2 TIMES DAILY
Status: DISCONTINUED | OUTPATIENT
Start: 2024-01-02 | End: 2024-01-05 | Stop reason: HOSPADM

## 2024-01-02 RX ORDER — DEXTROSE 50 % IN WATER (D50W) INTRAVENOUS SYRINGE
25
Status: DISCONTINUED | OUTPATIENT
Start: 2024-01-02 | End: 2024-01-05 | Stop reason: HOSPADM

## 2024-01-02 RX ORDER — HYDROCHLOROTHIAZIDE 12.5 MG/1
12.5 CAPSULE ORAL DAILY
Status: DISCONTINUED | OUTPATIENT
Start: 2024-01-02 | End: 2024-01-05 | Stop reason: HOSPADM

## 2024-01-02 RX ORDER — METOPROLOL TARTRATE 25 MG/1
25 TABLET, FILM COATED ORAL ONCE
Status: COMPLETED | OUTPATIENT
Start: 2024-01-02 | End: 2024-01-02

## 2024-01-02 RX ORDER — METFORMIN HYDROCHLORIDE 500 MG/1
500 TABLET ORAL DAILY
Status: DISCONTINUED | OUTPATIENT
Start: 2024-01-02 | End: 2024-01-05 | Stop reason: HOSPADM

## 2024-01-02 RX ORDER — ACETAMINOPHEN 500 MG
1000 TABLET ORAL NIGHTLY
COMMUNITY

## 2024-01-02 RX ADMIN — DILTIAZEM HYDROCHLORIDE 20 MG: 5 INJECTION INTRAVENOUS at 14:20

## 2024-01-02 RX ADMIN — ACETAMINOPHEN 975 MG: 325 TABLET ORAL at 21:06

## 2024-01-02 RX ADMIN — Medication 5 MG/HR: at 13:08

## 2024-01-02 RX ADMIN — METOPROLOL TARTRATE 25 MG: 25 TABLET, FILM COATED ORAL at 21:05

## 2024-01-02 RX ADMIN — METOPROLOL TARTRATE 25 MG: 25 TABLET, FILM COATED ORAL at 16:19

## 2024-01-02 RX ADMIN — DILTIAZEM HYDROCHLORIDE 20 MG: 5 INJECTION INTRAVENOUS at 13:04

## 2024-01-02 RX ADMIN — Medication 1000 UNITS: at 21:05

## 2024-01-02 RX ADMIN — APIXABAN 5 MG: 5 TABLET, FILM COATED ORAL at 21:05

## 2024-01-02 SDOH — SOCIAL STABILITY: SOCIAL INSECURITY: DO YOU FEEL UNSAFE GOING BACK TO THE PLACE WHERE YOU ARE LIVING?: NO

## 2024-01-02 SDOH — SOCIAL STABILITY: SOCIAL INSECURITY: DOES ANYONE TRY TO KEEP YOU FROM HAVING/CONTACTING OTHER FRIENDS OR DOING THINGS OUTSIDE YOUR HOME?: NO

## 2024-01-02 SDOH — SOCIAL STABILITY: SOCIAL INSECURITY: HAVE YOU HAD THOUGHTS OF HARMING ANYONE ELSE?: NO

## 2024-01-02 SDOH — SOCIAL STABILITY: SOCIAL INSECURITY: ARE YOU OR HAVE YOU BEEN THREATENED OR ABUSED PHYSICALLY, EMOTIONALLY, OR SEXUALLY BY ANYONE?: NO

## 2024-01-02 SDOH — SOCIAL STABILITY: SOCIAL INSECURITY: ABUSE: ADULT

## 2024-01-02 SDOH — SOCIAL STABILITY: SOCIAL INSECURITY: ARE THERE ANY APPARENT SIGNS OF INJURIES/BEHAVIORS THAT COULD BE RELATED TO ABUSE/NEGLECT?: NO

## 2024-01-02 SDOH — SOCIAL STABILITY: SOCIAL INSECURITY: HAS ANYONE EVER THREATENED TO HURT YOUR FAMILY OR YOUR PETS?: NO

## 2024-01-02 SDOH — SOCIAL STABILITY: SOCIAL INSECURITY: DO YOU FEEL ANYONE HAS EXPLOITED OR TAKEN ADVANTAGE OF YOU FINANCIALLY OR OF YOUR PERSONAL PROPERTY?: NO

## 2024-01-02 ASSESSMENT — LIFESTYLE VARIABLES
AUDIT-C TOTAL SCORE: 1
SKIP TO QUESTIONS 9-10: 1
HOW OFTEN DO YOU HAVE A DRINK CONTAINING ALCOHOL: MONTHLY OR LESS
HOW MANY STANDARD DRINKS CONTAINING ALCOHOL DO YOU HAVE ON A TYPICAL DAY: 1 OR 2
HOW OFTEN DO YOU HAVE 6 OR MORE DRINKS ON ONE OCCASION: NEVER
PRESCIPTION_ABUSE_PAST_12_MONTHS: NO
SUBSTANCE_ABUSE_PAST_12_MONTHS: NO
AUDIT-C TOTAL SCORE: 1

## 2024-01-02 ASSESSMENT — ACTIVITIES OF DAILY LIVING (ADL)
GROOMING: INDEPENDENT
HEARING - LEFT EAR: FUNCTIONAL
FEEDING YOURSELF: INDEPENDENT
LACK_OF_TRANSPORTATION: NO
WALKS IN HOME: INDEPENDENT
ADEQUATE_TO_COMPLETE_ADL: YES
HEARING - RIGHT EAR: FUNCTIONAL
PATIENT'S MEMORY ADEQUATE TO SAFELY COMPLETE DAILY ACTIVITIES?: YES
DRESSING YOURSELF: INDEPENDENT
TOILETING: INDEPENDENT
JUDGMENT_ADEQUATE_SAFELY_COMPLETE_DAILY_ACTIVITIES: YES
BATHING: INDEPENDENT

## 2024-01-02 ASSESSMENT — COGNITIVE AND FUNCTIONAL STATUS - GENERAL
PATIENT BASELINE BEDBOUND: NO
MOBILITY SCORE: 24
DAILY ACTIVITIY SCORE: 24

## 2024-01-02 ASSESSMENT — PAIN - FUNCTIONAL ASSESSMENT
PAIN_FUNCTIONAL_ASSESSMENT: 0-10

## 2024-01-02 ASSESSMENT — VISUAL ACUITY: OU: 1

## 2024-01-02 ASSESSMENT — COLUMBIA-SUICIDE SEVERITY RATING SCALE - C-SSRS
6. HAVE YOU EVER DONE ANYTHING, STARTED TO DO ANYTHING, OR PREPARED TO DO ANYTHING TO END YOUR LIFE?: NO
2. HAVE YOU ACTUALLY HAD ANY THOUGHTS OF KILLING YOURSELF?: NO
1. IN THE PAST MONTH, HAVE YOU WISHED YOU WERE DEAD OR WISHED YOU COULD GO TO SLEEP AND NOT WAKE UP?: NO

## 2024-01-02 ASSESSMENT — PAIN SCALES - GENERAL
PAINLEVEL_OUTOF10: 3
PAINLEVEL_OUTOF10: 0 - NO PAIN
PAINLEVEL_OUTOF10: 0 - NO PAIN
PAINLEVEL_OUTOF10: 3

## 2024-01-02 ASSESSMENT — PAIN DESCRIPTION - LOCATION: LOCATION: COCCYX

## 2024-01-02 ASSESSMENT — PATIENT HEALTH QUESTIONNAIRE - PHQ9
1. LITTLE INTEREST OR PLEASURE IN DOING THINGS: NOT AT ALL
SUM OF ALL RESPONSES TO PHQ9 QUESTIONS 1 & 2: 0
2. FEELING DOWN, DEPRESSED OR HOPELESS: NOT AT ALL

## 2024-01-02 NOTE — PROGRESS NOTES
Subjective  Graciela Norman is a 78 y.o. female who presents for Atrial Fibrillation.  Atrial Fibrillation  Past medical history includes atrial fibrillation.     Pt called and stated she hadn't felt well since October when she had a respiratory illness.  She has known a fib, and saw a cardiologist in [place].  At her last visit the cardiologist increased her metoprolol tartrate from 25 bid to 50 bid .  She did not feel well, so she stopped it completely.  At that same time she was also on a study drug that she understood to be like eliquis.  She has since been notified to stop the study drug and go back on eliquis, which she is taking.  Had what she states was a bad respiratory illness in October, and cough persisted for awhile.  She did not notify her cardiologist and no longer wants to drive to [place] to see someone, it is unclear how that care ended up in [place].   A week or two ago, she noticed bilateral leg swelling, so she stopped her hyzaar 50/12.5, and she feels that helped the leg swelling.  She is only currently taking her eliquis and her lipitor, the latter she takes every other day.    She is here today because she hasn't been feeling well.  Denies chest pain, denies dyspnea.  Mild leg swelling that is worse as day goes on, not every day. Denies orthopnea, PND, calf pain or swelling . Denies fevers/chills . She feels her heart racing frequently.  She states she has had anxiety her entire adult life and that is what it feels like.    History of diabetes, not currently taking metformin, recommend she resume. History of chemotherapy induced neuropathy.   Review of Systems   All other systems reviewed and are negative.  .    Objective     Visit Vitals  /88 (BP Location: Left arm, Patient Position: Sitting)   Pulse 88      Physical Exam  Vitals and nursing note reviewed.   Constitutional:       General: She is not in acute distress.     Appearance: Normal appearance. She is not toxic-appearing.    HENT:      Head: Normocephalic and atraumatic.   Cardiovascular:      Rate and Rhythm: Normal rate and regular rhythm.      Heart sounds: No murmur heard.  Pulmonary:      Effort: Pulmonary effort is normal.      Breath sounds: Normal breath sounds.   Musculoskeletal:      Cervical back: Neck supple. No rigidity.      Comments: Normal gait   Skin:     General: Skin is warm and dry.   Neurological:      General: No focal deficit present.      Mental Status: She is alert and oriented to person, place, and time.   Psychiatric:         Mood and Affect: Mood normal.         Behavior: Behavior normal.         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

## 2024-01-02 NOTE — PATIENT INSTRUCTIONS
Please do not stop medications without discussing your providers. Please call concerns.   Resume metoprolol 25 bid, fup in a few weeks.  We will get you into cardiology locally.  Routine followup in February with labs prior.

## 2024-01-02 NOTE — NURSING NOTE
Patient came from Dr. Gusman's office for an outpatient EKG.  Dr. Brock was notified of EKG results and wanted patient to go to the ER for further evaluation. Patient was taken to the emergency via wheel chair.  Dr. Blankenship's office was also contacted to update her on the patient's condition and EKG was also faxed to Dr. Gusman's office.

## 2024-01-02 NOTE — ED PROVIDER NOTES
Heart beating fast, history of atrial fibrillation.  This 78-year-old white female presents to the ED from cardiopulmonary after she was sent there for an EKG.  They noted that the patient was in A-fib with RVR with a heart rate of 160s and brought the patient to the ED for further treatment.  Patient states that she has been bad and weaned herself off of her metoprolol by Thanksgiving and has not been taking it since then she does admit to taking her Eliquis for her atrial fibrillation.  She states that she does feel as though her heart is pounding and has some associated shortness of breath she denies any chest pain symptoms with it.  She states that she is attempting to establish a relationship with a cardiologist.  She states that any type of activity makes her symptoms worse rest helps for the most part.      History provided by:  Patient (RT)   used: No         Physical Exam  Vitals and nursing note reviewed.   Constitutional:       General: She is awake.      Appearance: Normal appearance. She is obese.   HENT:      Head: Normocephalic and atraumatic.      Jaw: There is normal jaw occlusion.      Salivary Glands: Right salivary gland is not diffusely enlarged or tender. Left salivary gland is not diffusely enlarged or tender.      Right Ear: Hearing and external ear normal.      Left Ear: Hearing and external ear normal.      Nose: Nose normal. No congestion or rhinorrhea.      Mouth/Throat:      Lips: Pink.      Mouth: Mucous membranes are moist.      Pharynx: Oropharynx is clear. No oropharyngeal exudate or posterior oropharyngeal erythema.   Eyes:      General: Lids are normal. Vision grossly intact.         Right eye: No discharge.         Left eye: No discharge.      Extraocular Movements: Extraocular movements intact.      Right eye: Normal extraocular motion and no nystagmus.      Left eye: Normal extraocular motion and no nystagmus.      Conjunctiva/sclera: Conjunctivae normal.       Pupils: Pupils are equal, round, and reactive to light.   Cardiovascular:      Rate and Rhythm: Tachycardia present. Rhythm irregularly irregular.      Chest Wall: PMI is not displaced. No thrill.      Pulses: Normal pulses.      Heart sounds: Normal heart sounds. No murmur heard.     No friction rub. No gallop.   Pulmonary:      Effort: Pulmonary effort is normal. No tachypnea, prolonged expiration or respiratory distress.      Breath sounds: No stridor. No wheezing, rhonchi or rales.   Chest:      Chest wall: No tenderness.   Abdominal:      General: Abdomen is flat. Bowel sounds are normal. There is no distension.      Palpations: Abdomen is soft. There is no mass.      Tenderness: There is no abdominal tenderness. There is no guarding or rebound.      Hernia: No hernia is present.   Musculoskeletal:         General: No swelling, tenderness, deformity or signs of injury. Normal range of motion.      Cervical back: Full passive range of motion without pain, normal range of motion and neck supple.      Right lower le+ Edema present.      Left lower le+ Edema present.   Skin:     General: Skin is warm.      Capillary Refill: Capillary refill takes less than 2 seconds.      Coloration: Skin is not jaundiced or pale.      Findings: No bruising, erythema, lesion or rash.   Neurological:      General: No focal deficit present.      Mental Status: She is alert, oriented to person, place, and time and easily aroused.      GCS: GCS eye subscore is 4. GCS verbal subscore is 5. GCS motor subscore is 6.      Cranial Nerves: Cranial nerves 2-12 are intact. No cranial nerve deficit.      Sensory: Sensation is intact. No sensory deficit.      Motor: Motor function is intact. No weakness.      Coordination: Coordination is intact. Coordination normal.      Deep Tendon Reflexes: Reflexes normal.   Psychiatric:         Attention and Perception: Attention and perception normal.         Mood and Affect: Mood and affect  normal.         Speech: Speech normal.         Behavior: Behavior normal. Behavior is cooperative.         Thought Content: Thought content normal.         Judgment: Judgment normal.          Labs Reviewed   CBC WITH AUTO DIFFERENTIAL - Abnormal       Result Value    WBC 7.5      nRBC 0.0      RBC 5.05      Hemoglobin 15.9      Hematocrit 49.5 (*)     MCV 98      MCH 31.5      MCHC 32.1      RDW 13.0      Platelets 221      Neutrophils % 63.1      Immature Granulocytes %, Automated 0.3      Lymphocytes % 25.8      Monocytes % 9.1      Eosinophils % 0.8      Basophils % 0.9      Neutrophils Absolute 4.71      Immature Granulocytes Absolute, Automated 0.02      Lymphocytes Absolute 1.93      Monocytes Absolute 0.68      Eosinophils Absolute 0.06      Basophils Absolute 0.07     BASIC METABOLIC PANEL - Abnormal    Glucose 178 (*)     Sodium 137      Potassium 3.8      Chloride 102      Bicarbonate 23      Anion Gap 16      Urea Nitrogen 14      Creatinine 0.92      eGFR 65      Calcium 9.7     TROPONIN I, HIGH SENSITIVITY - Abnormal    Troponin I, High Sensitivity 33 (*)     Narrative:     Less than 99th percentile of normal range cutoff-  Female and children under 18 years old <14 ng/L; Male <21 ng/L: Negative  Repeat testing should be performed if clinically indicated.     Female and children under 18 years old 14-50 ng/L; Male 21-50 ng/L:  Consistent with possible cardiac damage and possible increased clinical   risk. Serial measurements may help to assess extent of myocardial damage.     >50 ng/L: Consistent with cardiac damage, increased clinical risk and  myocardial infarction. Serial measurements may help assess extent of   myocardial damage.      NOTE: Children less than 1 year old may have higher baseline troponin   levels and results should be interpreted in conjunction with the overall   clinical context.     NOTE: Troponin I testing is performed using a different   testing methodology at [facility]  [place] than at other   system hospitals. Direct result comparisons should only   be made within the same method.   B-TYPE NATRIURETIC PEPTIDE - Abnormal     (*)     Narrative:        <100 pg/mL - Heart failure unlikely  100-299 pg/mL - Intermediate probability of acute heart                  failure exacerbation. Correlate with clinical                  context and patient history.    >=300 pg/mL - Heart Failure likely. Correlate with clinical                  context and patient history.    BNP testing is performed using different testing methodology at [facility] than at other system hospitals. Direct result comparisons should only be made within the same method.      BASIC METABOLIC PANEL - Abnormal    Glucose 137 (*)     Sodium 139      Potassium 3.6      Chloride 107      Bicarbonate 24      Anion Gap 12      Urea Nitrogen 11      Creatinine 0.74      eGFR 84      Calcium 8.5 (*)    VITAMIN D 25-HYDROXY,TOTAL - Abnormal    Vitamin D, 25-Hydroxy, Total 21 (*)     Narrative:     Deficiency:         < 20   ng/ml  Insufficiency:      20-29  ng/ml  Sufficiency:         ng/ml  This assay accurately quantifies the sum of Vitamin D3, 25-Hydroxy and Vitamin D2,25-Hydroxy.   POCT GLUCOSE - Abnormal    POCT Glucose 183 (*)    POCT GLUCOSE - Abnormal    POCT Glucose 144 (*)    POCT GLUCOSE - Abnormal    POCT Glucose 133 (*)    MAGNESIUM - Normal    Magnesium 1.81     PHOSPHORUS - Normal    Phosphorus 3.3     TSH - Normal    Thyroid Stimulating Hormone 2.03      Narrative:     TSH testing is performed using different testing methodology at [facility] than at other [facility]. Direct result comparisons should only be made within the same method.     CBC - Normal    WBC 6.8      nRBC 0.0      RBC 4.26      Hemoglobin 13.5      Hematocrit 41.7      MCV 98      MCH 31.7      MCHC 32.4      RDW 13.1      Platelets 189     POCT FINGERSTICK GLUCOSE   POCT FINGERSTICK GLUCOSE   POCT  FINGERSTICK GLUCOSE        XR chest 1 view   Final Result   Moderate cardiomegaly with mild pulmonary vascular congestion.   Signed by Shawn Ham MD      Transthoracic Echo (TTE) Complete    (Results Pending)        Critical Care    Performed by: Orville Lyons DO  Authorized by: Orville Lyons DO    Critical care provider statement:     Critical care time (minutes):  40    Critical care time was exclusive of:  Separately billable procedures and treating other patients    Critical care was necessary to treat or prevent imminent or life-threatening deterioration of the following conditions: Cardiac dysrhythmia.    Critical care was time spent personally by me on the following activities:  Evaluation of patient's response to treatment, examination of patient, obtaining history from patient or surrogate, ordering and performing treatments and interventions, ordering and review of laboratory studies, ordering and review of radiographic studies, pulse oximetry and re-evaluation of patient's condition    I assumed direction of critical care for this patient from another provider in my specialty: no      Care discussed with: admitting provider         Medical Decision Making  Patient was seen and evaluated due to atrial fibrillation with RVR.  Patient had previously taken herself off of her metoprolol mainly because she did not like how she felt when she took this medication.  Her initial EKG revealed her heart rate to be in the 160s.  She also does complain of some shortness of breath though denied any chest pain she was treated with IV diltiazem bolus and subsequent drip and rebolus another 20 mg after that.  Her lab work revealed a normal CBC.  Her glucose was elevated 178.  Initial troponin was 33.  Her BNP though was high at 823.  Magnesium was normal at 1.81.  Phosphorus normal at 3.3.  TSH is normal at 3.03.  Chest x-ray revealed no acute disease process that was interpreted by myself.  The patient was  then admitted to the ICU for further treatment of her atrial fibrillation her heart rate has been improved to the 100s at admission.    Amount and/or Complexity of Data Reviewed  ECG/medicine tests: independent interpretation performed.     Details: EKG was interpreted by myself at 12:58 PM reveals atrial fibrillation with  bpm with right axis deviation and nonspecific ST-T wave changes.  The QRS duration 70 ms.  The QTc is 440 ms axis is 126 degrees.         Diagnoses as of 01/03/24 1003   Atrial fibrillation with RVR (CMS/[facility])                    Orville Lyons,   01/03/24 1003

## 2024-01-02 NOTE — H&P
History Of Present Illness  Graciela Norman is a 78 y.o. female with a significant past medical history of paroxysmal atrial fibrillation on Eliquis, supposed to be on Lopressor 25 mg twice daily, apparently patient weaned herself off from Lopressor recently, patient also has a significant history of hypertension hyperlipidemia, obesity, non-insulin-dependent diabetes mellitus, vitamin D deficiency today patient was seen by her cardiologist in the office, noticed that the patient is in atrial fibrillation with RVR, requested and sent patient to the emergency room for further evaluation.  In the ER patient is asymptomatic denies any chest pain or palpitations, denies any abdominal pain nausea or vomitings, no fever chills or rigors  Denies any urinary symptoms,.  Workup showed patient in atrial fibrillation with RVR electrolytes within normal limits, patient received 20 mg x2 of Cardizem with no improvement in heart rate, later patient was started on Cardizem drip and admitting patient to ICU for further management.     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    Surgical History  She has a past surgical history that includes Other surgical history (01/27/2020); Other surgical history (01/27/2020); Other surgical history (01/27/2020); Other surgical history (01/27/2020); Other surgical history (01/27/2020); Other surgical history (01/27/2020); Other surgical history (01/27/2020); Other surgical history (01/27/2020); Other surgical history (01/27/2020); and Other surgical history (05/26/2020).     Social History  She reports that she has quit smoking. Her smoking use included cigarettes. She has never used smokeless tobacco. She reports current alcohol use. She reports that she does not use drugs.    Family History  No family history on file.    "  Allergies  Aspirin, Diphenhydramine, Penicillins, Shellfish derived, and Sulfa (sulfonamide antibiotics)    Review of Systems  All 10 point review of systems reviewed pertinent positives in H&P.    Physical Exam  General: No distress  Neck: Supple  HEENT: Normocephalic atraumatic pupils equal react light.  Heart: Tachycardic irregular, no murmurs  Lungs: Clear to auscultation bilaterally.  Abdomen: Nondistended nontender bowel sounds are present  Neuro: No focal deficits    Last Recorded Vitals  Blood pressure (!) 132/108, pulse (!) 118, temperature 35.9 °C (96.7 °F), resp. rate 18, height 1.575 m (5' 2\"), weight 96.2 kg (212 lb), SpO2 93 %.    Relevant Results    Scheduled medications  metoprolol tartrate, 25 mg, oral, Once      Continuous medications  dilTIAZem, 5-15 mg/hr, Last Rate: 5 mg/hr (01/02/24 1308)      PRN medications  Results for orders placed or performed during the hospital encounter of 01/02/24 (from the past 24 hour(s))   CBC and Auto Differential   Result Value Ref Range    WBC 7.5 4.4 - 11.3 x10*3/uL    nRBC 0.0 0.0 - 0.0 /100 WBCs    RBC 5.05 4.00 - 5.20 x10*6/uL    Hemoglobin 15.9 12.0 - 16.0 g/dL    Hematocrit 49.5 (H) 36.0 - 46.0 %    MCV 98 80 - 100 fL    MCH 31.5 26.0 - 34.0 pg    MCHC 32.1 32.0 - 36.0 g/dL    RDW 13.0 11.5 - 14.5 %    Platelets 221 150 - 450 x10*3/uL    Neutrophils % 63.1 40.0 - 80.0 %    Immature Granulocytes %, Automated 0.3 0.0 - 0.9 %    Lymphocytes % 25.8 13.0 - 44.0 %    Monocytes % 9.1 2.0 - 10.0 %    Eosinophils % 0.8 0.0 - 6.0 %    Basophils % 0.9 0.0 - 2.0 %    Neutrophils Absolute 4.71 1.60 - 5.50 x10*3/uL    Immature Granulocytes Absolute, Automated 0.02 0.00 - 0.50 x10*3/uL    Lymphocytes Absolute 1.93 0.80 - 3.00 x10*3/uL    Monocytes Absolute 0.68 0.05 - 0.80 x10*3/uL    Eosinophils Absolute 0.06 0.00 - 0.40 x10*3/uL    Basophils Absolute 0.07 0.00 - 0.10 x10*3/uL   Magnesium   Result Value Ref Range    Magnesium 1.81 1.60 - 2.40 mg/dL   Phosphorus "   Result Value Ref Range    Phosphorus 3.3 2.5 - 4.9 mg/dL   Basic metabolic panel   Result Value Ref Range    Glucose 178 (H) 74 - 99 mg/dL    Sodium 137 136 - 145 mmol/L    Potassium 3.8 3.5 - 5.3 mmol/L    Chloride 102 98 - 107 mmol/L    Bicarbonate 23 21 - 32 mmol/L    Anion Gap 16 10 - 20 mmol/L    Urea Nitrogen 14 6 - 23 mg/dL    Creatinine 0.92 0.50 - 1.05 mg/dL    eGFR 65 >60 mL/min/1.73m*2    Calcium 9.7 8.6 - 10.3 mg/dL   Troponin I, High Sensitivity   Result Value Ref Range    Troponin I, High Sensitivity 33 (H) 0 - 13 ng/L   Thyroid Stimulating Hormone   Result Value Ref Range    Thyroid Stimulating Hormone 2.03 0.44 - 3.98 mIU/L   B-type natriuretic peptide   Result Value Ref Range     (H) 0 - 99 pg/mL     ECG 12 lead (Ancillary Performed)    Result Date: 1/2/2024  Atrial fibrillation with rapid ventricular response Nonspecific ST and T wave abnormality Abnormal ECG When compared with ECG of 02-JAN-2024 12:22, (unconfirmed) No significant change was found Confirmed by Markos Brock ([id number]) on 1/2/2024 1:07:09 PM        Assessment/Plan   Atrial fibrillation with RVR.    History of:  Paroxysmal atrial fibrillation on Eliquis  weaned herself off Lopressor recently.  Hypertension  Hyperlipidemia  Non-insulin-dependent diabetes mellitus  Obesity  Vitamin D deficiency    Plan:  Patient was sent to the emergency room from physician's office due to atrial fibrillation with RVR.  Patient has a history of atrial fibrillation, weaned herself off from Lopressor.  Received 2 x 20 mg Cardizem, no improvement in heart rate.  Started on Cardizem drip.  Continuing anticoagulation with Eliquis 5 mg twice daily.  Echocardiogram ordered  Cardiology consult placed.  Cycling troponins.  EKG ordered for tomorrow.  Follow-up CBC BMP.  PT OT.    DVT prophylaxis:  Patient is on Eliquis.    Disposition:  Possible discharge home in next 1 to 2 days.        I spent 35 minutes in the professional and overall care of this  patient.      Andrea Grossman MD

## 2024-01-02 NOTE — PROGRESS NOTES
Having episodes of a-fib; states no longer taking her metoprolol; stopped on her own; no longer seeing cardiologist due to him being in [place]

## 2024-01-03 ENCOUNTER — APPOINTMENT (OUTPATIENT)
Dept: CARDIOLOGY | Facility: HOSPITAL | Age: 79
DRG: 309 | End: 2024-01-03
Payer: MEDICARE

## 2024-01-03 LAB
25(OH)D3 SERPL-MCNC: 21 NG/ML (ref 30–100)
ANION GAP SERPL CALC-SCNC: 12 MMOL/L (ref 10–20)
AORTIC VALVE MEAN GRADIENT: 6
AORTIC VALVE PEAK VELOCITY: 1.66
AV PEAK GRADIENT: 11
AVA (PEAK VEL): 1.34
AVA (VTI): 1.01
BUN SERPL-MCNC: 11 MG/DL (ref 6–23)
CALCIUM SERPL-MCNC: 8.5 MG/DL (ref 8.6–10.3)
CHLORIDE SERPL-SCNC: 107 MMOL/L (ref 98–107)
CO2 SERPL-SCNC: 24 MMOL/L (ref 21–32)
CREAT SERPL-MCNC: 0.74 MG/DL (ref 0.5–1.05)
EJECTION FRACTION APICAL 4 CHAMBER: 45.4
EJECTION FRACTION: 46
ERYTHROCYTE [DISTWIDTH] IN BLOOD BY AUTOMATED COUNT: 13.1 % (ref 11.5–14.5)
GFR SERPL CREATININE-BSD FRML MDRD: 84 ML/MIN/1.73M*2
GLUCOSE BLD MANUAL STRIP-MCNC: 125 MG/DL (ref 74–99)
GLUCOSE BLD MANUAL STRIP-MCNC: 133 MG/DL (ref 74–99)
GLUCOSE BLD MANUAL STRIP-MCNC: 148 MG/DL (ref 74–99)
GLUCOSE BLD MANUAL STRIP-MCNC: 156 MG/DL (ref 74–99)
GLUCOSE SERPL-MCNC: 137 MG/DL (ref 74–99)
HCT VFR BLD AUTO: 41.7 % (ref 36–46)
HGB BLD-MCNC: 13.5 G/DL (ref 12–16)
HOLD SPECIMEN: NORMAL
LEFT ATRIUM VOLUME AREA LENGTH INDEX BSA: 51.9
LEFT VENTRICLE INTERNAL DIMENSION DIASTOLE: 3.59 (ref 3.5–6)
LEFT VENTRICULAR OUTFLOW TRACT DIAMETER: 2
MCH RBC QN AUTO: 31.7 PG (ref 26–34)
MCHC RBC AUTO-ENTMCNC: 32.4 G/DL (ref 32–36)
MCV RBC AUTO: 98 FL (ref 80–100)
NRBC BLD-RTO: 0 /100 WBCS (ref 0–0)
PLATELET # BLD AUTO: 189 X10*3/UL (ref 150–450)
POTASSIUM SERPL-SCNC: 3.6 MMOL/L (ref 3.5–5.3)
RBC # BLD AUTO: 4.26 X10*6/UL (ref 4–5.2)
RIGHT VENTRICLE PEAK SYSTOLIC PRESSURE: 34.8
SODIUM SERPL-SCNC: 139 MMOL/L (ref 136–145)
TRICUSPID ANNULAR PLANE SYSTOLIC EXCURSION: 1.5
WBC # BLD AUTO: 6.8 X10*3/UL (ref 4.4–11.3)

## 2024-01-03 PROCEDURE — 93005 ELECTROCARDIOGRAM TRACING: CPT

## 2024-01-03 PROCEDURE — 2500000002 HC RX 250 W HCPCS SELF ADMINISTERED DRUGS (ALT 637 FOR MEDICARE OP, ALT 636 FOR OP/ED): Performed by: INTERNAL MEDICINE

## 2024-01-03 PROCEDURE — 2020000001 HC ICU ROOM DAILY

## 2024-01-03 PROCEDURE — 2500000004 HC RX 250 GENERAL PHARMACY W/ HCPCS (ALT 636 FOR OP/ED)

## 2024-01-03 PROCEDURE — 99222 1ST HOSP IP/OBS MODERATE 55: CPT

## 2024-01-03 PROCEDURE — 2500000001 HC RX 250 WO HCPCS SELF ADMINISTERED DRUGS (ALT 637 FOR MEDICARE OP): Performed by: INTERNAL MEDICINE

## 2024-01-03 PROCEDURE — 85027 COMPLETE CBC AUTOMATED: CPT | Performed by: INTERNAL MEDICINE

## 2024-01-03 PROCEDURE — 82947 ASSAY GLUCOSE BLOOD QUANT: CPT

## 2024-01-03 PROCEDURE — 82306 VITAMIN D 25 HYDROXY: CPT | Performed by: INTERNAL MEDICINE

## 2024-01-03 PROCEDURE — 36415 COLL VENOUS BLD VENIPUNCTURE: CPT | Performed by: INTERNAL MEDICINE

## 2024-01-03 PROCEDURE — 2500000001 HC RX 250 WO HCPCS SELF ADMINISTERED DRUGS (ALT 637 FOR MEDICARE OP)

## 2024-01-03 PROCEDURE — 2500000005 HC RX 250 GENERAL PHARMACY W/O HCPCS: Performed by: INTERNAL MEDICINE

## 2024-01-03 PROCEDURE — 80048 BASIC METABOLIC PNL TOTAL CA: CPT | Performed by: INTERNAL MEDICINE

## 2024-01-03 PROCEDURE — 93306 TTE W/DOPPLER COMPLETE: CPT | Performed by: STUDENT IN AN ORGANIZED HEALTH CARE EDUCATION/TRAINING PROGRAM

## 2024-01-03 PROCEDURE — 99233 SBSQ HOSP IP/OBS HIGH 50: CPT | Performed by: INTERNAL MEDICINE

## 2024-01-03 PROCEDURE — 93306 TTE W/DOPPLER COMPLETE: CPT

## 2024-01-03 PROCEDURE — 2500000004 HC RX 250 GENERAL PHARMACY W/ HCPCS (ALT 636 FOR OP/ED): Performed by: INTERNAL MEDICINE

## 2024-01-03 PROCEDURE — 94762 N-INVAS EAR/PLS OXIMTRY CONT: CPT

## 2024-01-03 RX ORDER — DILTIAZEM HYDROCHLORIDE 60 MG/1
60 TABLET, FILM COATED ORAL EVERY 6 HOURS
Status: DISCONTINUED | OUTPATIENT
Start: 2024-01-03 | End: 2024-01-05 | Stop reason: HOSPADM

## 2024-01-03 RX ADMIN — Medication 1 L/MIN: at 04:15

## 2024-01-03 RX ADMIN — DILTIAZEM HYDROCHLORIDE 60 MG: 60 TABLET ORAL at 17:07

## 2024-01-03 RX ADMIN — Medication 10 MG/HR: at 09:56

## 2024-01-03 RX ADMIN — METFORMIN HYDROCHLORIDE 500 MG: 500 TABLET ORAL at 09:33

## 2024-01-03 RX ADMIN — Medication 1000 UNITS: at 20:30

## 2024-01-03 RX ADMIN — LOSARTAN POTASSIUM 50 MG: 50 TABLET, FILM COATED ORAL at 09:32

## 2024-01-03 RX ADMIN — METOPROLOL TARTRATE 25 MG: 25 TABLET, FILM COATED ORAL at 20:30

## 2024-01-03 RX ADMIN — PERFLUTREN 1 ML OF DILUTION: 6.52 INJECTION, SUSPENSION INTRAVENOUS at 13:18

## 2024-01-03 RX ADMIN — HYDROCHLOROTHIAZIDE 12.5 MG: 12.5 CAPSULE ORAL at 09:32

## 2024-01-03 RX ADMIN — METOPROLOL TARTRATE 25 MG: 25 TABLET, FILM COATED ORAL at 09:32

## 2024-01-03 RX ADMIN — DILTIAZEM HYDROCHLORIDE 60 MG: 60 TABLET ORAL at 22:06

## 2024-01-03 RX ADMIN — DILTIAZEM HYDROCHLORIDE 60 MG: 60 TABLET ORAL at 10:52

## 2024-01-03 RX ADMIN — APIXABAN 5 MG: 5 TABLET, FILM COATED ORAL at 20:30

## 2024-01-03 RX ADMIN — ACETAMINOPHEN 975 MG: 325 TABLET ORAL at 20:30

## 2024-01-03 RX ADMIN — APIXABAN 5 MG: 5 TABLET, FILM COATED ORAL at 09:33

## 2024-01-03 ASSESSMENT — PAIN - FUNCTIONAL ASSESSMENT
PAIN_FUNCTIONAL_ASSESSMENT: 0-10

## 2024-01-03 ASSESSMENT — PAIN SCALES - GENERAL
PAINLEVEL_OUTOF10: 0 - NO PAIN
PAINLEVEL_OUTOF10: 0 - NO PAIN
PAINLEVEL_OUTOF10: 1
PAINLEVEL_OUTOF10: 1
PAINLEVEL_OUTOF10: 0 - NO PAIN
PAINLEVEL_OUTOF10: 0 - NO PAIN

## 2024-01-03 ASSESSMENT — ACTIVITIES OF DAILY LIVING (ADL): LACK_OF_TRANSPORTATION: NO

## 2024-01-03 NOTE — CARE PLAN
Problem: Pain - Adult  Goal: Verbalizes/displays adequate comfort level or baseline comfort level  Outcome: Progressing     Problem: Safety - Adult  Goal: Free from fall injury  Outcome: Progressing     Problem: Discharge Planning  Goal: Discharge to home or other facility with appropriate resources  Outcome: Progressing     Problem: Chronic Conditions and Co-morbidities  Goal: Patient's chronic conditions and co-morbidity symptoms are monitored and maintained or improved  Outcome: Progressing     Problem: Diabetes  Goal: Achieve decreasing blood glucose levels by end of shift  Outcome: Progressing  Goal: Increase stability of blood glucose readings by end of shift  Outcome: Progressing  Goal: Decrease in ketones present in urine by end of shift  Outcome: Progressing  Goal: Maintain electrolyte levels within acceptable range throughout shift  Outcome: Progressing  Goal: Maintain glucose levels >70mg/dl to <250mg/dl throughout shift  Outcome: Progressing  Goal: No changes in neurological exam by end of shift  Outcome: Progressing  Goal: Learn about and adhere to nutrition recommendations by end of shift  Outcome: Progressing  Goal: Vital signs within normal range for age by end of shift  Outcome: Progressing  Goal: Increase self care and/or family involovement by end of shift  Outcome: Progressing  Goal: Receive DSME education by end of shift  Outcome: Progressing     Problem: Pain  Goal: My pain/discomfort is manageable  Outcome: Progressing     Problem: Safety  Goal: Patient will be injury free during hospitalization  Outcome: Progressing  Goal: I will remain free of falls  Outcome: Progressing     Problem: Daily Care  Goal: Daily care needs are met  Outcome: Progressing     Problem: Psychosocial Needs  Goal: Demonstrates ability to cope with hospitalization/illness  Outcome: Progressing  Goal: Collaborate with me, my family, and caregiver to identify my specific goals  Outcome: Progressing     Problem: Discharge  Barriers  Goal: My discharge needs are met  Outcome: Progressing     Problem: Respiratory  Goal: Clear secretions with interventions this shift  Outcome: Progressing  Goal: Minimize anxiety/maximize coping throughout shift  Outcome: Progressing  Goal: Minimal/no exertional discomfort or dyspnea this shift  Outcome: Progressing  Goal: No signs of respiratory distress (eg. Use of accessory muscles. Peds grunting)  Outcome: Progressing  Goal: Patent airway maintained this shift  Outcome: Progressing  Goal: Tolerate mechanical ventilation evidenced by VS/agitation level this shift  Outcome: Progressing  Goal: Tolerate pulmonary toileting this shift  Outcome: Progressing  Goal: Verbalize decreased shortness of breath this shift  Outcome: Progressing  Goal: Wean oxygen to maintain O2 saturation per order/standard this shift  Outcome: Progressing  Goal: Increase self care and/or family involvement in next 24 hours  Outcome: Progressing     Problem: Arrythmia/Dysrhythmia  Goal: Lab values return to normal range  Outcome: Progressing  Goal: No evidence of post procedure complications  Outcome: Progressing  Goal: Promote self management  Outcome: Progressing  Goal: Serial ECG will return to baseline  Outcome: Progressing  Goal: Verbalize understanding of procedures/devices  Outcome: Progressing  Goal: Vital signs return to baseline  Outcome: Progressing  Goal: Care and maintenance of device (specify)  Outcome: Progressing   The patient's goals for the shift include      The clinical goals for the shift include hr under 100    Pt remains on cardizem drip this shift at 5 ml/hr. Taking PO metoprolol when due. Afib on tele with HR ranging from 's primarily.

## 2024-01-03 NOTE — CONSULTS
Inpatient consult to Cardiology  Consult performed by: Chandrika Sanchez, APRN-CNP, DNP  Consult ordered by: Andrea MARSHALL MD        History Of Present Illness:    Graciela Norman is a 78 y.o. female who had an outpatient EKG done and noted to be in Afib with RVR and instructed to go to the ER. Upon arrival, patient's heart rate was 166 and hypertensive. Patient was given Cardizem 20mg x 2 in the ER with no improvement in her heart rate and ultimately started on a Cardizem drip. Pertinent labs include elevated blood glucose, troponin 33, . Cardiology consulted secondary to Afib with RVR.    Patient examined at bedside. She has known paroxysmal atrial fibrillation (dx in 2016) and reports weaning herself off of the Lopressor due to adverse effects of fatigue, rapid heart rate, and flushing. She does report compliance with Eliquis. Patient previously was under Card[facility] at [facility] with last visit in April, 2023. She reports no longer seeing the doctor due to difference in opinions. Patient reports she has not felt well since October and reports occasional fluttering in her chest. She denies any chest pain/pressure/discomfort, SOB, or dizziness. She reports lower extremity edema.     Previous cardiac testing:  Stress test (12/2021)-negative for ischemia  Echo (1/2021)-preserved biventricular function      Last Recorded Vitals:  Vitals:    01/03/24 0615 01/03/24 0700 01/03/24 0800 01/03/24 0900   BP:  122/72 (!) 145/122 (!) 154/97   Pulse:  107 (!) 115 109   Resp:  (!) 30 23 25   Temp:  36.9 °C (98.4 °F)     TempSrc:  Temporal     SpO2: 98% 98%     Weight:       Height:           Last Labs:  CBC - 1/3/2024:  5:52 AM  6.8 13.5 189    41.7      CMP - 1/3/2024:  5:52 AM  8.5 7.3 21 --- 1.8   3.3 4.1 23 68      PTT - No results in last year.  _   _ _     Troponin I, High Sensitivity   Date/Time Value Ref Range Status   01/02/2024 01:11 PM 33 (H) 0 - 13 ng/L Final     BNP   Date/Time Value Ref Range Status    01/02/2024 01:11  (H) 0 - 99 pg/mL Final   01/02/2021 06:27  (H) 0 - 99 pg/mL Final     Comment:     .  <100 pg/mL - Heart failure unlikely  100-299 pg/mL - Intermediate probability of acute heart  .               failure exacerbation. Correlate with clinical  .               context and patient history.    >=300 pg/mL - Heart Failure likely. Correlate with clinical  .               context and patient history.  BNP testing is performed using different testing   methodology at [facility] than at other   [facility] hospitals. Direct result comparisons should   only be made within the same method.       Hemoglobin A1C   Date/Time Value Ref Range Status   08/11/2023 11:08 AM 7.0 (A) % Final     Comment:          Diagnosis of Diabetes-Adults   Non-Diabetic: < or = 5.6%   Increased risk for developing diabetes: 5.7-6.4%   Diagnostic of diabetes: > or = 6.5%  .       Monitoring of Diabetes                Age (y)     Therapeutic Goal (%)   Adults:          >18           <7.0   Pediatrics:    13-18           <7.5                   7-12           <8.0                   0- 6            7.5-8.5   American Diabetes Association. Diabetes Care 33(S1), Jan 2010.     02/07/2023 11:50 AM 6.7 (A) % Final     Comment:          Diagnosis of Diabetes-Adults   Non-Diabetic: < or = 5.6%   Increased risk for developing diabetes: 5.7-6.4%   Diagnostic of diabetes: > or = 6.5%  .       Monitoring of Diabetes                Age (y)     Therapeutic Goal (%)   Adults:          >18           <7.0   Pediatrics:    13-18           <7.5                   7-12           <8.0                   0- 6            7.5-8.5   American Diabetes Association. Diabetes Care 33(S1), Jan 2010.       VLDL   Date/Time Value Ref Range Status   08/11/2023 12:00 PM 27 0 - 40 mg/dL Final   08/01/2022 12:08 PM 24 0 - 40 mg/dL Final   05/18/2021 11:14 AM 28 0 - 40 mg/dL Final      Last I/O:  No intake/output data recorded.    Past Cardiology Tests  "(Last 3 Years):  EKG:  ECG 12 Lead 01/03/2024 (Preliminary)      ECG 12 lead (Ancillary Performed) 01/02/2024    Echo:  No results found for this or any previous visit from the past 1095 days.    Ejection Fractions:  No results found for: \"EF\"  Cath:  No results found for this or any previous visit from the past 1095 days.    Stress Test:  No results found for this or any previous visit from the past 1095 days.    Cardiac Imaging:  No results found for this or any previous visit from the past 1095 days.      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    Past Surgical History:  She has a past surgical history that includes Other surgical history (01/27/2020); Other surgical history (01/27/2020); Other surgical history (01/27/2020); Other surgical history (01/27/2020); Other surgical history (01/27/2020); Other surgical history (01/27/2020); Other surgical history (01/27/2020); Other surgical history (01/27/2020); Other surgical history (01/27/2020); and Other surgical history (05/26/2020).      Social History:  She reports that she has quit smoking. Her smoking use included cigarettes. She has never used smokeless tobacco. She reports current alcohol use. She reports that she does not use drugs.    Family History:  No family history on file.     Allergies:  Aspirin, Diphenhydramine, Penicillins, Shellfish derived, and Sulfa (sulfonamide antibiotics)    Inpatient Medications:  Scheduled medications   Medication Dose Route Frequency    acetaminophen  975 mg oral Nightly    apixaban  5 mg oral BID    atorvastatin  20 mg oral Daily    cholecalciferol  1,000 Units oral Nightly    cyanocobalamin  1,000 mcg oral Daily    hydroCHLOROthiazide  12.5 mg oral Daily    insulin lispro  0-10 Units subcutaneous TID with meals    losartan  50 mg oral Daily    metFORMIN  500 mg " oral Daily    metoprolol tartrate  25 mg oral BID     PRN medications   Medication    dextrose 10 % in water (D10W)    dextrose    glucagon    oxygen     Continuous Medications   Medication Dose Last Rate    dilTIAZem  5-15 mg/hr 10 mg/hr (01/03/24 0931)     Outpatient Medications:  Current Outpatient Medications   Medication Instructions    acetaminophen (TYLENOL) 1,000 mg, oral, Nightly    apixaban (ELIQUIS) 5 mg, oral, 2 times daily, States takes 2 in am and 1 at night    atorvastatin (LIPITOR) 20 mg, oral, Daily    cholecalciferol (VITAMIN D-3) 1,000 Units, oral, Nightly    cyanocobalamin (Vitamin B-12) 1,000 mcg tablet 1 tablet, oral, Daily    losartan-hydrochlorothiazide (Hyzaar) 50-12.5 mg tablet 1 tablet, oral, Daily    metFORMIN (GLUCOPHAGE) 500 mg, oral, Daily    metoprolol tartrate (LOPRESSOR) 25 mg, oral, 2 times daily       Physical Exam:  General: awake, alert and oriented. No acute distress.   Skin: Skin is warm, dry and intact without rashes or lesions.  HEENT: normocephalic, atraumatic; conjunctivae are clear without exudates or hemorrhage. Sclera is non-icteric. Eyelids are normal in appearance without swelling or lesions. Hearing intact. Nares are patent bilaterally. Moist mucous membranes.   Cardiovascular: irregularly irregular and fast  Respiratory: bilateral lung sounds clear to auscultations without rales, rhonchi, or wheezes. No accessory muscle use or stridor  Gastrointestinal: non-distended, non-tender  Genitourinary: exam deferred  Musculoskeletal: ROM intact, no deformities  Extremities: pulses palpable bilaterally; no swelling or erythema  Neurological: no focal deficits  Psychiatric: appropriate mood and affect; good judgment and insight         Assessment/Plan     Atrial fibrillation with RVR:  -Current resting HR in the 130s; will transition to oral Cardizem at 60mg every six hours and see how she responds. Patient was restarted on Lopressor 25mg twice daily and reports no adverse  reactions at this time  -[id number] Score of 4; continue Eliquis 5mg twice daily; no overt bleeding   -Will repeat echo and compare to previous results in 2021  -Will order a 14 day monitor at discharge   -Recommend patient establish care with us in the outpatient setting     HTN:  -Patient has hx of HTN and remains hypertensive at this time. Will hold off on adjusting other medications and see how she responds to the oral diltiazem.     Peripheral IV 01/02/24 20 G Right Antecubital (Active)   Site Assessment Clean;Dry;Intact 01/03/24 0600   Dressing Type Transparent 01/03/24 0600   Line Status Infusing 01/03/24 0600   Dressing Status Clean;Dry 01/03/24 0600   Number of days: 1       Code Status:  Full Code  Thank you for allowing me to participate in the care of this patient. Please reach me out if you have any questions or if you need any clarifications regarding the patient's care.            Chandrika Sanchez, APRN-CNP, DNP

## 2024-01-03 NOTE — PROGRESS NOTES
Subjective   Patient is sitting on the bed comfortably, denies any chest pain shortness of breath or palpitations  No other significant overnight issues.    Objective     Intake/Output last 3 shifts:  No intake/output data recorded.    Intake/Output this shift:  I/O this shift:  In: 415.9 [P.O.:300; I.V.:115.9]  Out: -     Recent Results (from the past 48 hour(s))   ECG 12 lead (Ancillary Performed)    Collection Time: 01/02/24 12:15 PM   Result Value Ref Range    Ventricular Rate 168 BPM    Atrial Rate 170 BPM    QRS Duration 72 ms    QT Interval 228 ms    QTC Calculation(Bazett) 381 ms    R Axis 44 degrees    T Axis 189 degrees    QRS Count 28 beats    Q Onset 228 ms    T Offset 342 ms    QTC Fredericia 321 ms   CBC and Auto Differential    Collection Time: 01/02/24  1:11 PM   Result Value Ref Range    WBC 7.5 4.4 - 11.3 x10*3/uL    nRBC 0.0 0.0 - 0.0 /100 WBCs    RBC 5.05 4.00 - 5.20 x10*6/uL    Hemoglobin 15.9 12.0 - 16.0 g/dL    Hematocrit 49.5 (H) 36.0 - 46.0 %    MCV 98 80 - 100 fL    MCH 31.5 26.0 - 34.0 pg    MCHC 32.1 32.0 - 36.0 g/dL    RDW 13.0 11.5 - 14.5 %    Platelets 221 150 - 450 x10*3/uL    Neutrophils % 63.1 40.0 - 80.0 %    Immature Granulocytes %, Automated 0.3 0.0 - 0.9 %    Lymphocytes % 25.8 13.0 - 44.0 %    Monocytes % 9.1 2.0 - 10.0 %    Eosinophils % 0.8 0.0 - 6.0 %    Basophils % 0.9 0.0 - 2.0 %    Neutrophils Absolute 4.71 1.60 - 5.50 x10*3/uL    Immature Granulocytes Absolute, Automated 0.02 0.00 - 0.50 x10*3/uL    Lymphocytes Absolute 1.93 0.80 - 3.00 x10*3/uL    Monocytes Absolute 0.68 0.05 - 0.80 x10*3/uL    Eosinophils Absolute 0.06 0.00 - 0.40 x10*3/uL    Basophils Absolute 0.07 0.00 - 0.10 x10*3/uL   Magnesium    Collection Time: 01/02/24  1:11 PM   Result Value Ref Range    Magnesium 1.81 1.60 - 2.40 mg/dL   Phosphorus    Collection Time: 01/02/24  1:11 PM   Result Value Ref Range    Phosphorus 3.3 2.5 - 4.9 mg/dL   Basic metabolic panel    Collection Time: 01/02/24  1:11 PM    Result Value Ref Range    Glucose 178 (H) 74 - 99 mg/dL    Sodium 137 136 - 145 mmol/L    Potassium 3.8 3.5 - 5.3 mmol/L    Chloride 102 98 - 107 mmol/L    Bicarbonate 23 21 - 32 mmol/L    Anion Gap 16 10 - 20 mmol/L    Urea Nitrogen 14 6 - 23 mg/dL    Creatinine 0.92 0.50 - 1.05 mg/dL    eGFR 65 >60 mL/min/1.73m*2    Calcium 9.7 8.6 - 10.3 mg/dL   Troponin I, High Sensitivity    Collection Time: 01/02/24  1:11 PM   Result Value Ref Range    Troponin I, High Sensitivity 33 (H) 0 - 13 ng/L   Thyroid Stimulating Hormone    Collection Time: 01/02/24  1:11 PM   Result Value Ref Range    Thyroid Stimulating Hormone 2.03 0.44 - 3.98 mIU/L   B-type natriuretic peptide    Collection Time: 01/02/24  1:11 PM   Result Value Ref Range     (H) 0 - 99 pg/mL   SST TOP    Collection Time: 01/02/24  1:11 PM   Result Value Ref Range    Extra Tube Hold for add-ons.    POCT GLUCOSE    Collection Time: 01/02/24  6:12 PM   Result Value Ref Range    POCT Glucose 183 (H) 74 - 99 mg/dL   POCT GLUCOSE    Collection Time: 01/02/24  8:17 PM   Result Value Ref Range    POCT Glucose 144 (H) 74 - 99 mg/dL   CBC    Collection Time: 01/03/24  5:52 AM   Result Value Ref Range    WBC 6.8 4.4 - 11.3 x10*3/uL    nRBC 0.0 0.0 - 0.0 /100 WBCs    RBC 4.26 4.00 - 5.20 x10*6/uL    Hemoglobin 13.5 12.0 - 16.0 g/dL    Hematocrit 41.7 36.0 - 46.0 %    MCV 98 80 - 100 fL    MCH 31.7 26.0 - 34.0 pg    MCHC 32.4 32.0 - 36.0 g/dL    RDW 13.1 11.5 - 14.5 %    Platelets 189 150 - 450 x10*3/uL   Basic Metabolic Panel    Collection Time: 01/03/24  5:52 AM   Result Value Ref Range    Glucose 137 (H) 74 - 99 mg/dL    Sodium 139 136 - 145 mmol/L    Potassium 3.6 3.5 - 5.3 mmol/L    Chloride 107 98 - 107 mmol/L    Bicarbonate 24 21 - 32 mmol/L    Anion Gap 12 10 - 20 mmol/L    Urea Nitrogen 11 6 - 23 mg/dL    Creatinine 0.74 0.50 - 1.05 mg/dL    eGFR 84 >60 mL/min/1.73m*2    Calcium 8.5 (L) 8.6 - 10.3 mg/dL   Vitamin D 25-Hydroxy,Total (for eval of Vitamin D  levels)    Collection Time: 01/03/24  5:52 AM   Result Value Ref Range    Vitamin D, 25-Hydroxy, Total 21 (L) 30 - 100 ng/mL   SST TOP    Collection Time: 01/03/24  5:55 AM   Result Value Ref Range    Extra Tube Hold for add-ons.    ECG 12 Lead    Collection Time: 01/03/24  6:15 AM   Result Value Ref Range    Ventricular Rate 167 BPM    Atrial Rate 187 BPM    QRS Duration 70 ms    QT Interval 264 ms    QTC Calculation(Bazett) 440 ms    R Axis 126 degrees    T Axis -5 degrees    QRS Count 28 beats    Q Onset 226 ms    T Offset 358 ms    QTC Fredericia 371 ms   POCT GLUCOSE    Collection Time: 01/03/24  7:26 AM   Result Value Ref Range    POCT Glucose 133 (H) 74 - 99 mg/dL   POCT GLUCOSE    Collection Time: 01/03/24 11:30 AM   Result Value Ref Range    POCT Glucose 148 (H) 74 - 99 mg/dL        ECG 12 Lead  Atrial fibrillation with rapid ventricular response with premature ventricular or aberrantly conducted complexes  Right axis deviation  Nonspecific ST abnormality  Abnormal QRS-T angle, consider primary T wave abnormality  Abnormal ECG  When compared with ECG of 02-JAN-2024 12:52, (unconfirmed)  Significant changes have occurred       Vital signs in last 24 hours:  Temp:  [35.9 °C (96.7 °F)-36.9 °C (98.4 °F)] 36.3 °C (97.3 °F)  Heart Rate:  [] 116  Resp:  [18-30] 18  BP: (113-154)/() 123/91  FiO2 (%):  [24 %] 24 %    Physical Exam  General: No distress  Neck: Supple  HEENT: Normocephalic atraumatic pupils equal react light.  Heart: Tachycardic irregular, no murmurs  Lungs: Clear to auscultation bilaterally.  Abdomen: Nondistended nontender bowel sounds are present  Neuro: No focal deficits    Assessment/Plan   Atrial fibrillation with RVR.     History of:  Paroxysmal atrial fibrillation on Eliquis  weaned herself off Lopressor recently.  Hypertension  Hyperlipidemia  Non-insulin-dependent diabetes mellitus  Obesity  Vitamin D deficiency     Plan:  Patient was sent to the emergency room from physician's  office due to atrial fibrillation with RVR.  Patient has a history of atrial fibrillation, weaned herself off from Lopressor.  Received 2 x 20 mg Cardizem, no improvement in heart rate.  Wean Cardizem drip.  Cardiology planning to transition to oral Cardizem 60 mg every 6 hours.  Continuing anticoagulation with Eliquis as patient's [id number] score is 4.    DVT prophylaxis:  Patient is on Eliquis    Disposition:  Possible discharge tomorrow.  Cardiology planning for Holter monitor as outpatient.       LOS: 1 day

## 2024-01-04 LAB
ANION GAP SERPL CALC-SCNC: 11 MMOL/L (ref 10–20)
ATRIAL RATE: 187 BPM
BUN SERPL-MCNC: 12 MG/DL (ref 6–23)
CALCIUM SERPL-MCNC: 8.6 MG/DL (ref 8.6–10.3)
CHLORIDE SERPL-SCNC: 105 MMOL/L (ref 98–107)
CO2 SERPL-SCNC: 24 MMOL/L (ref 21–32)
CREAT SERPL-MCNC: 0.77 MG/DL (ref 0.5–1.05)
ERYTHROCYTE [DISTWIDTH] IN BLOOD BY AUTOMATED COUNT: 13 % (ref 11.5–14.5)
GFR SERPL CREATININE-BSD FRML MDRD: 80 ML/MIN/1.73M*2
GLUCOSE BLD MANUAL STRIP-MCNC: 124 MG/DL (ref 74–99)
GLUCOSE BLD MANUAL STRIP-MCNC: 140 MG/DL (ref 74–99)
GLUCOSE BLD MANUAL STRIP-MCNC: 155 MG/DL (ref 74–99)
GLUCOSE BLD MANUAL STRIP-MCNC: 169 MG/DL (ref 74–99)
GLUCOSE SERPL-MCNC: 125 MG/DL (ref 74–99)
HCT VFR BLD AUTO: 42.4 % (ref 36–46)
HGB BLD-MCNC: 13.7 G/DL (ref 12–16)
MCH RBC QN AUTO: 31.6 PG (ref 26–34)
MCHC RBC AUTO-ENTMCNC: 32.3 G/DL (ref 32–36)
MCV RBC AUTO: 98 FL (ref 80–100)
NRBC BLD-RTO: 0 /100 WBCS (ref 0–0)
PLATELET # BLD AUTO: 195 X10*3/UL (ref 150–450)
POTASSIUM SERPL-SCNC: 3.4 MMOL/L (ref 3.5–5.3)
Q ONSET: 226 MS
QRS COUNT: 28 BEATS
QRS DURATION: 70 MS
QT INTERVAL: 264 MS
QTC CALCULATION(BAZETT): 440 MS
QTC FREDERICIA: 371 MS
R AXIS: 126 DEGREES
RBC # BLD AUTO: 4.34 X10*6/UL (ref 4–5.2)
SODIUM SERPL-SCNC: 137 MMOL/L (ref 136–145)
T AXIS: -5 DEGREES
T OFFSET: 358 MS
VENTRICULAR RATE: 167 BPM
WBC # BLD AUTO: 6.3 X10*3/UL (ref 4.4–11.3)

## 2024-01-04 PROCEDURE — 80048 BASIC METABOLIC PNL TOTAL CA: CPT | Performed by: INTERNAL MEDICINE

## 2024-01-04 PROCEDURE — 2500000002 HC RX 250 W HCPCS SELF ADMINISTERED DRUGS (ALT 637 FOR MEDICARE OP, ALT 636 FOR OP/ED)

## 2024-01-04 PROCEDURE — 82947 ASSAY GLUCOSE BLOOD QUANT: CPT

## 2024-01-04 PROCEDURE — 2500000002 HC RX 250 W HCPCS SELF ADMINISTERED DRUGS (ALT 637 FOR MEDICARE OP, ALT 636 FOR OP/ED): Performed by: INTERNAL MEDICINE

## 2024-01-04 PROCEDURE — 2500000001 HC RX 250 WO HCPCS SELF ADMINISTERED DRUGS (ALT 637 FOR MEDICARE OP)

## 2024-01-04 PROCEDURE — 2500000001 HC RX 250 WO HCPCS SELF ADMINISTERED DRUGS (ALT 637 FOR MEDICARE OP): Performed by: INTERNAL MEDICINE

## 2024-01-04 PROCEDURE — 99233 SBSQ HOSP IP/OBS HIGH 50: CPT | Performed by: INTERNAL MEDICINE

## 2024-01-04 PROCEDURE — 2500000004 HC RX 250 GENERAL PHARMACY W/ HCPCS (ALT 636 FOR OP/ED)

## 2024-01-04 PROCEDURE — 99232 SBSQ HOSP IP/OBS MODERATE 35: CPT

## 2024-01-04 PROCEDURE — 36415 COLL VENOUS BLD VENIPUNCTURE: CPT | Performed by: INTERNAL MEDICINE

## 2024-01-04 PROCEDURE — 2020000001 HC ICU ROOM DAILY

## 2024-01-04 PROCEDURE — 2500000004 HC RX 250 GENERAL PHARMACY W/ HCPCS (ALT 636 FOR OP/ED): Performed by: INTERNAL MEDICINE

## 2024-01-04 PROCEDURE — 85027 COMPLETE CBC AUTOMATED: CPT | Performed by: INTERNAL MEDICINE

## 2024-01-04 PROCEDURE — 94761 N-INVAS EAR/PLS OXIMETRY MLT: CPT

## 2024-01-04 RX ORDER — AMIODARONE HYDROCHLORIDE 200 MG/1
200 TABLET ORAL DAILY
Status: DISCONTINUED | OUTPATIENT
Start: 2024-01-11 | End: 2024-01-05 | Stop reason: HOSPADM

## 2024-01-04 RX ORDER — POTASSIUM CHLORIDE 20 MEQ/1
40 TABLET, EXTENDED RELEASE ORAL ONCE
Status: COMPLETED | OUTPATIENT
Start: 2024-01-04 | End: 2024-01-04

## 2024-01-04 RX ORDER — SPIRONOLACTONE 25 MG/1
12.5 TABLET ORAL DAILY
Status: DISCONTINUED | OUTPATIENT
Start: 2024-01-04 | End: 2024-01-05

## 2024-01-04 RX ORDER — AMIODARONE HYDROCHLORIDE 200 MG/1
400 TABLET ORAL 2 TIMES DAILY
Status: DISCONTINUED | OUTPATIENT
Start: 2024-01-04 | End: 2024-01-05 | Stop reason: HOSPADM

## 2024-01-04 RX ORDER — LOSARTAN POTASSIUM 50 MG/1
50 TABLET ORAL DAILY
Status: DISCONTINUED | OUTPATIENT
Start: 2024-01-05 | End: 2024-01-05 | Stop reason: HOSPADM

## 2024-01-04 RX ADMIN — METFORMIN HYDROCHLORIDE 500 MG: 500 TABLET ORAL at 08:56

## 2024-01-04 RX ADMIN — POTASSIUM CHLORIDE 40 MEQ: 1500 TABLET, EXTENDED RELEASE ORAL at 08:55

## 2024-01-04 RX ADMIN — METOPROLOL TARTRATE 25 MG: 25 TABLET, FILM COATED ORAL at 08:56

## 2024-01-04 RX ADMIN — ACETAMINOPHEN 975 MG: 325 TABLET ORAL at 21:22

## 2024-01-04 RX ADMIN — SPIRONOLACTONE 12.5 MG: 25 TABLET ORAL at 11:00

## 2024-01-04 RX ADMIN — DILTIAZEM HYDROCHLORIDE 60 MG: 60 TABLET ORAL at 21:22

## 2024-01-04 RX ADMIN — Medication 1000 UNITS: at 21:22

## 2024-01-04 RX ADMIN — HYDROCHLOROTHIAZIDE 12.5 MG: 12.5 CAPSULE ORAL at 08:57

## 2024-01-04 RX ADMIN — METOPROLOL TARTRATE 25 MG: 25 TABLET, FILM COATED ORAL at 21:22

## 2024-01-04 RX ADMIN — AMIODARONE HYDROCHLORIDE 400 MG: 200 TABLET ORAL at 21:22

## 2024-01-04 RX ADMIN — DILTIAZEM HYDROCHLORIDE 60 MG: 60 TABLET ORAL at 17:27

## 2024-01-04 RX ADMIN — DILTIAZEM HYDROCHLORIDE 60 MG: 60 TABLET ORAL at 04:49

## 2024-01-04 RX ADMIN — DILTIAZEM HYDROCHLORIDE 60 MG: 60 TABLET ORAL at 11:00

## 2024-01-04 RX ADMIN — APIXABAN 5 MG: 5 TABLET, FILM COATED ORAL at 21:22

## 2024-01-04 RX ADMIN — CYANOCOBALAMIN TAB 1000 MCG 1000 MCG: 1000 TAB at 08:56

## 2024-01-04 RX ADMIN — LOSARTAN POTASSIUM 50 MG: 50 TABLET, FILM COATED ORAL at 08:57

## 2024-01-04 RX ADMIN — APIXABAN 5 MG: 5 TABLET, FILM COATED ORAL at 08:56

## 2024-01-04 RX ADMIN — AMIODARONE HYDROCHLORIDE 400 MG: 200 TABLET ORAL at 11:00

## 2024-01-04 ASSESSMENT — PAIN - FUNCTIONAL ASSESSMENT
PAIN_FUNCTIONAL_ASSESSMENT: 0-10

## 2024-01-04 ASSESSMENT — PAIN SCALES - GENERAL
PAINLEVEL_OUTOF10: 0 - NO PAIN
PAINLEVEL_OUTOF10: 2
PAINLEVEL_OUTOF10: 0 - NO PAIN

## 2024-01-04 NOTE — PROGRESS NOTES
"   01/03/24 1040   Discharge Planning   Living Arrangements Spouse/significant other   Support Systems Spouse/significant other   Assistance Needed None   Type of Residence Private residence   Number of Stairs to Enter Residence 0   Number of Stairs Within Residence 0   Do you have animals or pets at home? No   Who is requesting discharge planning? Patient   Home or Post Acute Services None   Patient expects to be discharged to: Home   Does the patient need discharge transport arranged? No   Financial Resource Strain   How hard is it for you to pay for the very basics like food, housing, medical care, and heating? Not hard   Housing Stability   In the last 12 months, was there a time when you were not able to pay the mortgage or rent on time? N   In the last 12 months, how many places have you lived? 1   In the last 12 months, was there a time when you did not have a steady place to sleep or slept in a shelter (including now)? N   Transportation Needs   In the past 12 months, has lack of transportation kept you from medical appointments or from getting medications? no   In the past 12 months, has lack of transportation kept you from meetings, work, or from getting things needed for daily living? No   Patient Choice   Provider Choice list and CMS website (https://medicare.gov/care-compare#search) for post-acute Quality and Resource Measure Data were provided and reviewed with: Other (Comment)  (NA)   Patient / Family choosing to utilize agency / facility established prior to hospitalization No     Met with pt and  at the bedside and verified address, phone number and emergency contact information. PCP is  Becky Gusman last seen yesterday and pharmacy of choice is [facility] in [place].Pt has no trouble affording or getting her medication however she was not taking as ordered \"she didn't think she needed it\". Pt is a diabetic and does not check her blood sugar, She is on oral medication and her physician monitors it " and has never told her to do anything different. Pt does have and use a C-Pap at night. Pt is independent and lives at home with her  and feels safe. Pt plans to return home with  no new needs at discharge. CT to follow. Bernarda Salazar BSN/RN-TCC

## 2024-01-04 NOTE — PROGRESS NOTES
Subjective   Patient is sitting on the bed comfortably, denies any chest pain shortness of breath or palpitations  No other significant overnight issues.    Objective     Intake/Output last 3 shifts:  I/O last 3 completed shifts:  In: 415.9 (4.3 mL/kg) [P.O.:300; I.V.:115.9 (1.2 mL/kg)]  Out: - (0 mL/kg)   Weight: 96.8 kg     Intake/Output this shift:  No intake/output data recorded.    Recent Results (from the past 48 hour(s))   POCT GLUCOSE    Collection Time: 01/02/24  6:12 PM   Result Value Ref Range    POCT Glucose 183 (H) 74 - 99 mg/dL   POCT GLUCOSE    Collection Time: 01/02/24  8:17 PM   Result Value Ref Range    POCT Glucose 144 (H) 74 - 99 mg/dL   CBC    Collection Time: 01/03/24  5:52 AM   Result Value Ref Range    WBC 6.8 4.4 - 11.3 x10*3/uL    nRBC 0.0 0.0 - 0.0 /100 WBCs    RBC 4.26 4.00 - 5.20 x10*6/uL    Hemoglobin 13.5 12.0 - 16.0 g/dL    Hematocrit 41.7 36.0 - 46.0 %    MCV 98 80 - 100 fL    MCH 31.7 26.0 - 34.0 pg    MCHC 32.4 32.0 - 36.0 g/dL    RDW 13.1 11.5 - 14.5 %    Platelets 189 150 - 450 x10*3/uL   Basic Metabolic Panel    Collection Time: 01/03/24  5:52 AM   Result Value Ref Range    Glucose 137 (H) 74 - 99 mg/dL    Sodium 139 136 - 145 mmol/L    Potassium 3.6 3.5 - 5.3 mmol/L    Chloride 107 98 - 107 mmol/L    Bicarbonate 24 21 - 32 mmol/L    Anion Gap 12 10 - 20 mmol/L    Urea Nitrogen 11 6 - 23 mg/dL    Creatinine 0.74 0.50 - 1.05 mg/dL    eGFR 84 >60 mL/min/1.73m*2    Calcium 8.5 (L) 8.6 - 10.3 mg/dL   Vitamin D 25-Hydroxy,Total (for eval of Vitamin D levels)    Collection Time: 01/03/24  5:52 AM   Result Value Ref Range    Vitamin D, 25-Hydroxy, Total 21 (L) 30 - 100 ng/mL   SST TOP    Collection Time: 01/03/24  5:55 AM   Result Value Ref Range    Extra Tube Hold for add-ons.    ECG 12 Lead    Collection Time: 01/03/24  6:15 AM   Result Value Ref Range    Ventricular Rate 167 BPM    Atrial Rate 187 BPM    QRS Duration 70 ms    QT Interval 264 ms    QTC Calculation(Bazett) 440 ms    R  Axis 126 degrees    T Axis -5 degrees    QRS Count 28 beats    Q Onset 226 ms    T Offset 358 ms    QTC Fredericia 371 ms   POCT GLUCOSE    Collection Time: 01/03/24  7:26 AM   Result Value Ref Range    POCT Glucose 133 (H) 74 - 99 mg/dL   POCT GLUCOSE    Collection Time: 01/03/24 11:30 AM   Result Value Ref Range    POCT Glucose 148 (H) 74 - 99 mg/dL   Transthoracic Echo (TTE) Complete    Collection Time: 01/03/24  2:58 PM   Result Value Ref Range    LVOT diam 2.00     AV pk momo 1.66     AV mn grad 6.0     LV biplane EF 46     Tricuspid annular plane systolic excursion 1.5     LA vol index A/L 51.9     LVIDd 3.59     RVSP 34.8     Aortic Valve Area by Continuity of Peak Velocity 1.34     Aortic Valve Area by Continuity of VTI 1.01     AV pk grad 11.0     LV A4C EF 45.4    POCT GLUCOSE    Collection Time: 01/03/24  4:47 PM   Result Value Ref Range    POCT Glucose 125 (H) 74 - 99 mg/dL   POCT GLUCOSE    Collection Time: 01/03/24  7:42 PM   Result Value Ref Range    POCT Glucose 156 (H) 74 - 99 mg/dL   CBC    Collection Time: 01/04/24  4:42 AM   Result Value Ref Range    WBC 6.3 4.4 - 11.3 x10*3/uL    nRBC 0.0 0.0 - 0.0 /100 WBCs    RBC 4.34 4.00 - 5.20 x10*6/uL    Hemoglobin 13.7 12.0 - 16.0 g/dL    Hematocrit 42.4 36.0 - 46.0 %    MCV 98 80 - 100 fL    MCH 31.6 26.0 - 34.0 pg    MCHC 32.3 32.0 - 36.0 g/dL    RDW 13.0 11.5 - 14.5 %    Platelets 195 150 - 450 x10*3/uL   Basic Metabolic Panel    Collection Time: 01/04/24  4:42 AM   Result Value Ref Range    Glucose 125 (H) 74 - 99 mg/dL    Sodium 137 136 - 145 mmol/L    Potassium 3.4 (L) 3.5 - 5.3 mmol/L    Chloride 105 98 - 107 mmol/L    Bicarbonate 24 21 - 32 mmol/L    Anion Gap 11 10 - 20 mmol/L    Urea Nitrogen 12 6 - 23 mg/dL    Creatinine 0.77 0.50 - 1.05 mg/dL    eGFR 80 >60 mL/min/1.73m*2    Calcium 8.6 8.6 - 10.3 mg/dL   POCT GLUCOSE    Collection Time: 01/04/24  7:13 AM   Result Value Ref Range    POCT Glucose 124 (H) 74 - 99 mg/dL   POCT GLUCOSE     Collection Time: 01/04/24 11:02 AM   Result Value Ref Range    POCT Glucose 155 (H) 74 - 99 mg/dL        Transthoracic Echo (TTE) [place] [address]  Phone [phone], Fax [phone]    TRANSTHORACIC ECHOCARDIOGRAM REPORT       Patient Name:      PABLITO MANNING    Reading Physician:    [id number] Markos Brock MD  Study Date:        1/3/2024             Ordering Provider:    [id number] ABNER CLEMENS  MRN/PID:           [id number]             Fellow:  Accession#:        [id number]         Nurse:                Meenu Mcfadden RN  Date of Birth/Age: 1945 / 78 years Sonographer:          NUSRAT Robin RVT  Gender:            F                    Additional Staff:  Height:            157.48 cm            Admit Date:           1/2/2024  Weight:            96.62 kg             Admission Status:     Inpatient -                                                                Routine  BSA:               1.96 m2              Department Location:  [address]-ICU  Blood Pressure: 106 /66 mmHg    Study Type:    TRANSTHORACIC ECHO (TTE) COMPLETE  Diagnosis/ICD: Unspecified atrial fibrillation-I48.91  Indication:    Afib  CPT Codes:     Echo Complete w Full Doppler-[id number]    Patient History:  Pertinent History: No previous echo.    Study Detail: The following Echo studies were performed: 2D, M-Mode, Doppler and                color flow. Definity used as a contrast agent for endocardial                border definition. A bubble study was not performed. The patient                was awake.       PHYSICIAN INTERPRETATION:  Left Ventricle: Left ventricular systolic function is moderately decreased, with an estimated ejection fraction of 35%. There is global  hypokinesis of the left ventricle with minor regional variations. The left ventricular cavity size is normal. There is mild asymmetric left ventricular hypertrophy. Left ventricular diastolic filling was indeterminate.  Left Atrium: The left atrium is severely dilated.  Right Ventricle: The right ventricle is normal in size. There is normal right ventricular global systolic function.  Right Atrium: The right atrium is mildly dilated.  Aortic Valve: The aortic valve was not well visualized. There is no evidence of aortic valve regurgitation. The peak instantaneous gradient of the aortic valve is 11.0 mmHg. The mean gradient of the aortic valve is 6.0 mmHg.  Mitral Valve: The mitral valve is normal in structure. There is moderate mitral valve regurgitation.  Tricuspid Valve: The tricuspid valve was not well visualized. There is trace tricuspid regurgitation.  Pulmonic Valve: The pulmonic valve is not well visualized. There is no indication of pulmonic valve regurgitation.  Pericardium: There is no pericardial effusion noted.  Aorta: The aortic root is normal.  Systemic Veins: The inferior vena cava appears dilated.       CONCLUSIONS:   1. Left ventricular systolic function is moderately decreased with a 35% estimated ejection fraction.   2. There is global hypokinesis of the left ventricle with minor regional variations.   3. There is mild asymmetric left ventricular hypertrophy.   4. The left atrium is severely dilated.   5. Moderate mitral valve regurgitation.    QUANTITATIVE DATA SUMMARY:  2D MEASUREMENTS:                           Normal Ranges:  Ao Root d:     2.80 cm   (2.0-3.7cm)  LAs:           4.20 cm   (2.7-4.0cm)  IVSd:          1.51 cm   (0.6-1.1cm)  LVPWd:         1.15 cm   (0.6-1.1cm)  LVIDd:         3.59 cm   (3.9-5.9cm)  LVIDs:         2.69 cm  LV Mass Index: 84.2 g/m2  LV % FS        25.1 %    LA VOLUME:                                Normal Ranges:  LA Vol A4C:        99.4 ml    (22+/-6mL/m2)  LA Vol  A2C:        86.3 ml  LA Vol BP:         101.8 ml  LA Vol Index A4C:  50.6ml/m2  LA Vol Index A2C:  43.9 ml/m2  LA Vol Index BP:   51.9 ml/m2  LA Area A4C:       29.4 cm2  LA Area A2C:       24.9 cm2  LA Major Axis A4C: 7.4 cm  LA Major Axis A2C: 6.1 cm  LA Volume Index:   43.0 ml/m2  LA Vol A4C:        97.1 ml  LA Vol A2C:        84.2 ml    LV SYSTOLIC FUNCTION BY 2D PLANIMETRY (MOD):                      Normal Ranges:  EF-A4C View: 45.4 % (>=55%)  EF-A2C View: 44.9 %  EF-Biplane:  46.1 %    LV DIASTOLIC FUNCTION:                      Normal Ranges:  MV Peak E: 1.29 m/s (0.7-1.2 m/s)    MITRAL VALVE:                  Normal Ranges:  MV DT: 151 msec (150-240msec)    MITRAL INSUFFICIENCY:                       Normal Ranges:  MR Vmax: 484.00 cm/s    AORTIC VALVE:                                     Normal Ranges:  AoV Vmax:                1.66 m/s  (<=1.7m/s)  AoV Peak P.0 mmHg (<20mmHg)  AoV Mean P.0 mmHg  (1.7-11.5mmHg)  LVOT Max Cade:            0.71 m/s  (<=1.1m/s)  AoV VTI:                 32.70 cm  (18-25cm)  LVOT VTI:                10.50 cm  LVOT Diameter:           2.00 cm   (1.8-2.4cm)  AoV Area, VTI:           1.01 cm2  (2.5-5.5cm2)  AoV Area,Vmax:           1.34 cm2  (2.5-4.5cm2)  AoV Dimensionless Index: 0.32       RIGHT VENTRICLE:  RV Basal 3.99 cm  RV Mid   2.82 cm  RV Major 7.0 cm  TAPSE:   14.7 mm    TRICUSPID VALVE/RVSP:                              Normal Ranges:  Peak TR Velocity: 2.82 m/s  RV Syst Pressure: 34.8 mmHg (< 30mmHg)    PULMONIC VALVE:                          Normal Ranges:  PV Accel Time: 88 msec  (>120ms)  PV Max Cade:    1.0 m/s  (0.6-0.9m/s)  PV Max P.2 mmHg       [id number] Markos Brock MD  Electronically signed on 1/3/2024 at 6:39:44 PM       ** Final **  ECG 12 Lead  Atrial fibrillation with rapid ventricular response with premature ventricular or aberrantly conducted complexes  Right axis deviation  Nonspecific ST abnormality  Abnormal  QRS-T angle, consider primary T wave abnormality  Abnormal ECG  When compared with ECG of 02-JAN-2024 12:52, (unconfirmed)  Significant changes have occurred       Vital signs in last 24 hours:  Temp:  [36.2 °C (97.2 °F)-37 °C (98.6 °F)] 36.3 °C (97.3 °F)  Heart Rate:  [] 110  Resp:  [19-29] 25  BP: (109-151)/() 116/85  FiO2 (%):  [21 %] 21 %    Physical Exam  General: No distress  Neck: Supple  HEENT: Normocephalic atraumatic pupils equal react light.  Heart: Tachycardic irregular, no murmurs  Lungs: Clear to auscultation bilaterally.  Abdomen: Nondistended nontender bowel sounds are present  Neuro: No focal deficits    Assessment/Plan   Atrial fibrillation with RVR.     History of:  Paroxysmal atrial fibrillation on Eliquis  weaned herself off Lopressor recently.  Hypertension  Hyperlipidemia  Non-insulin-dependent diabetes mellitus  Obesity  Vitamin D deficiency     Plan:  Patient was sent to the emergency room from physician's office due to atrial fibrillation with RVR.  Patient has a history of atrial fibrillation, weaned herself off from Lopressor.  Received 2 x 20 mg Cardizem, no improvement in heart rate.  Patient weaned off from Cardizem drip.  Currently on oral Cardizem 60 mg every 6 hours-still in atrial fibrillation with RVR.  Cardiology started on amiodarone  Continuing anticoagulation with Eliquis as patient's [id number] score is 4.    DVT prophylaxis:  Patient is on Eliquis    Disposition:  Possible discharge tomorrow.  Cardiology planning for Holter monitor as outpatient.       LOS: 2 days

## 2024-01-04 NOTE — CARE PLAN
Care Transitions: Patient is not medically ready for discharge today per discussion in care round meeting this AM; she is no longer on ICU status and may require another 24 hour stay. Met with patient at bedside. Explained role of TCC and continuing to help patient through the discharge process. IMM explained, patient signed and copy given, original in chart. Voiced understanding. Confirmed her plan remains to return home and denies any needs or in home services. Care team to follow. Janice Sainz RN/TCC   Doxepin Pregnancy And Lactation Text: This medication is Pregnancy Category C and it isn't known if it is safe during pregnancy. It is also excreted in breast milk and breast feeding isn't recommended.

## 2024-01-04 NOTE — PROGRESS NOTES
Subjective Data:  Patient examined at bedside; reports feeling better, however, still short of breath. Echo results reviewed and discussed with patient. Resting HR continues to be elevated.     Overnight Events:    None     Objective Data:  Last Recorded Vitals:  Vitals:    01/04/24 0200 01/04/24 0300 01/04/24 0711 01/04/24 0856   BP:  (!) 123/93 113/88    BP Location:       Patient Position:       Pulse:  74 99 109   Resp:  23 21    Temp:  36.3 °C (97.3 °F) 36.2 °C (97.2 °F)    TempSrc:   Temporal    SpO2: 95% 93% 97%    Weight:       Height:           Last Labs:  CBC - 1/4/2024:  4:42 AM  6.3 13.7 195    42.4      CMP - 1/4/2024:  4:42 AM  8.6 7.3 21 --- 1.8   3.3 4.1 23 68      PTT - No results in last year.  _   _ _     TROPHS   Date/Time Value Ref Range Status   01/02/2024 01:11 PM 33 0 - 13 ng/L Final     BNP   Date/Time Value Ref Range Status   01/02/2024 01:11  0 - 99 pg/mL Final   01/02/2021 06:27  0 - 99 pg/mL Final     Comment:     .  <100 pg/mL - Heart failure unlikely  100-299 pg/mL - Intermediate probability of acute heart  .               failure exacerbation. Correlate with clinical  .               context and patient history.    >=300 pg/mL - Heart Failure likely. Correlate with clinical  .               context and patient history.  BNP testing is performed using different testing   methodology at [facility] than at other   [facility]. Direct result comparisons should   only be made within the same method.       HGBA1C   Date/Time Value Ref Range Status   08/11/2023 11:08 AM 7.0 % Final     Comment:          Diagnosis of Diabetes-Adults   Non-Diabetic: < or = 5.6%   Increased risk for developing diabetes: 5.7-6.4%   Diagnostic of diabetes: > or = 6.5%  .       Monitoring of Diabetes                Age (y)     Therapeutic Goal (%)   Adults:          >18           <7.0   Pediatrics:    13-18           <7.5                   7-12           <8.0                   0- 6             7.5-8.5   American Diabetes Association. Diabetes Care 33(S1), Jan 2010.     02/07/2023 11:50 AM 6.7 % Final     Comment:          Diagnosis of Diabetes-Adults   Non-Diabetic: < or = 5.6%   Increased risk for developing diabetes: 5.7-6.4%   Diagnostic of diabetes: > or = 6.5%  .       Monitoring of Diabetes                Age (y)     Therapeutic Goal (%)   Adults:          >18           <7.0   Pediatrics:    13-18           <7.5                   7-12           <8.0                   0- 6            7.5-8.5   American Diabetes Association. Diabetes Care 33(S1), Jan 2010.       VLDL   Date/Time Value Ref Range Status   08/11/2023 12:00 PM 27 0 - 40 mg/dL Final   08/01/2022 12:08 PM 24 0 - 40 mg/dL Final   05/18/2021 11:14 AM 28 0 - 40 mg/dL Final      Last I/O:  I/O last 3 completed shifts:  In: 415.9 (4.3 mL/kg) [P.O.:300; I.V.:115.9 (1.2 mL/kg)]  Out: - (0 mL/kg)   Weight: 96.8 kg     Past Cardiology Tests (Last 3 Years):  EKG:  ECG 12 Lead 01/03/2024 (Preliminary)      ECG 12 lead (Ancillary Performed) 01/02/2024    Echo:  Transthoracic Echo (TTE) Complete 01/03/2024    Ejection Fractions:  EF   Date/Time Value Ref Range Status   01/03/2024 02:58 PM 46       Cath:  No results found for this or any previous visit from the past 1095 days.    Stress Test:  No results found for this or any previous visit from the past 1095 days.    Cardiac Imaging:  No results found for this or any previous visit from the past 1095 days.      Inpatient Medications:  Scheduled medications   Medication Dose Route Frequency    acetaminophen  975 mg oral Nightly    apixaban  5 mg oral BID    atorvastatin  20 mg oral Daily    cholecalciferol  1,000 Units oral Nightly    cyanocobalamin  1,000 mcg oral Daily    dilTIAZem  60 mg oral q6h    hydroCHLOROthiazide  12.5 mg oral Daily    insulin lispro  0-10 Units subcutaneous TID with meals    losartan  50 mg oral Daily    metFORMIN  500 mg oral Daily    metoprolol tartrate  25 mg oral  BID     PRN medications   Medication    dextrose 10 % in water (D10W)    dextrose    glucagon    oxygen     Continuous Medications   Medication Dose Last Rate       Physical Exam:  General: awake, alert and oriented. No acute distress.   Skin: Skin is warm, dry and intact without rashes or lesions.  HEENT: normocephalic, atraumatic; conjunctivae are clear without exudates or hemorrhage. Sclera is non-icteric. Eyelids are normal in appearance without swelling or lesions. Hearing intact. Nares are patent bilaterally. Moist mucous membranes.   Cardiovascular: irregularly irregular and fast  Respiratory: bilateral lung sounds clear to auscultations without rales, rhonchi, or wheezes. No accessory muscle use or stridor  Gastrointestinal: non-distended, non-tender  Genitourinary: exam deferred  Musculoskeletal: ROM intact, no deformities  Extremities: pulses palpable bilaterally; no swelling or erythema  Neurological: no focal deficits  Psychiatric: appropriate mood and affect; good judgment and insight     Assessment/Plan     Atrial fibrillation with RVR:  -Current resting has slightly improved compared to yesterday, however, still remains elevated.  -Echo now showing moderately decreased EF of 35%, most likely due to uncontrolled Afib given her stress test and echo were normal in 2021.   -Will continue with GDMT for HFrEF; Continue with ARB (BP has dropped quite a bit with initiation of diltiazem), continue BB. Will start spironolactone 12.5mg daily. Recommend initiation of SGLT2i at discharge. Ultimately, patient would benefit from ARNi but due to patient is sensitive to medication, BP has dropped quite a bit with oral diltiazem, and HR continues to be elevated I will focus on rate control.  -Continue diltiazem 60mg every six hours  -Continue Lopressor 25mg twice daily; patient is adamant she does not want to go any higher on the dose due to adverse effects in the past which resulted in her weaning herself off of the  medication  -Will start Amio with a loading dose of 400mg twice daily x 7 days followed by 200mg daily to achieve better rate control.   -[id number] Score of 4; continue Eliquis 5mg twice daily; no overt bleeding   -14 day monitor ordered at discharge  -Patient agreeable to establish care with us after discharge    Plan reviewed and discussed with Dr. Brock and Dr. Jurado     Peripheral IV 01/02/24 20 G Right Antecubital (Active)   Site Assessment Clean;Dry;Intact 01/04/24 0900   Dressing Type Transparent 01/04/24 0900   Line Status Saline locked 01/04/24 0900   Dressing Status Clean;Dry;Occlusive 01/04/24 0900   Number of days: 2       Code Status:  Full Code    Thank you for allowing me to participate in the care of this patient. Please reach me out if you have any questions or if you need any clarifications regarding the patient's care.          Chandrika Sanchez, APRN-CNP, DNP

## 2024-01-04 NOTE — CARE PLAN
The patient's goals for the shift include      The clinical goals for the shift include HR < 100 off cardizem drip this shift  Pt. In bed this shift.  Watching tv and resting some.  Respirations even and unlabored.  Denied any pain.  In pleasant mood.  No distress noted.  Call light within reach, will continue to monitor.   Problem: Pain - Adult  Goal: Verbalizes/displays adequate comfort level or baseline comfort level  Outcome: Progressing     Problem: Safety - Adult  Goal: Free from fall injury  Outcome: Progressing     Problem: Discharge Planning  Goal: Discharge to home or other facility with appropriate resources  Outcome: Progressing     Problem: Chronic Conditions and Co-morbidities  Goal: Patient's chronic conditions and co-morbidity symptoms are monitored and maintained or improved  Outcome: Progressing     Problem: Diabetes  Goal: Achieve decreasing blood glucose levels by end of shift  Outcome: Progressing  Goal: Increase stability of blood glucose readings by end of shift  Outcome: Progressing  Goal: Decrease in ketones present in urine by end of shift  Outcome: Progressing  Goal: Maintain electrolyte levels within acceptable range throughout shift  Outcome: Progressing  Goal: Maintain glucose levels >70mg/dl to <250mg/dl throughout shift  Outcome: Progressing  Goal: No changes in neurological exam by end of shift  Outcome: Progressing  Goal: Learn about and adhere to nutrition recommendations by end of shift  Outcome: Progressing  Goal: Vital signs within normal range for age by end of shift  Outcome: Progressing  Goal: Increase self care and/or family involovement by end of shift  Outcome: Progressing  Goal: Receive DSME education by end of shift  Outcome: Progressing     Problem: Safety  Goal: Patient will be injury free during hospitalization  Outcome: Progressing  Goal: I will remain free of falls  Outcome: Progressing     Problem: Discharge Barriers  Goal: My discharge needs are met  Outcome:  Progressing     Problem: Respiratory  Goal: Clear secretions with interventions this shift  Outcome: Progressing  Goal: Minimize anxiety/maximize coping throughout shift  Outcome: Progressing  Goal: Minimal/no exertional discomfort or dyspnea this shift  Outcome: Progressing  Goal: No signs of respiratory distress (eg. Use of accessory muscles. Peds grunting)  Outcome: Progressing  Goal: Patent airway maintained this shift  Outcome: Progressing  Goal: Tolerate mechanical ventilation evidenced by VS/agitation level this shift  Outcome: Progressing  Goal: Tolerate pulmonary toileting this shift  Outcome: Progressing  Goal: Verbalize decreased shortness of breath this shift  Outcome: Progressing  Goal: Wean oxygen to maintain O2 saturation per order/standard this shift  Outcome: Progressing  Goal: Increase self care and/or family involvement in next 24 hours  Outcome: Progressing

## 2024-01-05 ENCOUNTER — PHARMACY VISIT (OUTPATIENT)
Dept: PHARMACY | Facility: CLINIC | Age: 79
End: 2024-01-05
Payer: COMMERCIAL

## 2024-01-05 ENCOUNTER — APPOINTMENT (OUTPATIENT)
Dept: CARDIOLOGY | Facility: HOSPITAL | Age: 79
DRG: 309 | End: 2024-01-05
Payer: MEDICARE

## 2024-01-05 VITALS
SYSTOLIC BLOOD PRESSURE: 113 MMHG | HEART RATE: 98 BPM | HEIGHT: 62 IN | DIASTOLIC BLOOD PRESSURE: 98 MMHG | RESPIRATION RATE: 25 BRPM | TEMPERATURE: 96.8 F | WEIGHT: 213.41 LBS | OXYGEN SATURATION: 94 % | BODY MASS INDEX: 39.27 KG/M2

## 2024-01-05 PROBLEM — I48.91 ATRIAL FIBRILLATION WITH RVR (MULTI): Status: RESOLVED | Noted: 2024-01-02 | Resolved: 2024-01-05

## 2024-01-05 LAB
ANION GAP SERPL CALC-SCNC: 12 MMOL/L (ref 10–20)
BUN SERPL-MCNC: 14 MG/DL (ref 6–23)
CALCIUM SERPL-MCNC: 9 MG/DL (ref 8.6–10.3)
CHLORIDE SERPL-SCNC: 104 MMOL/L (ref 98–107)
CO2 SERPL-SCNC: 25 MMOL/L (ref 21–32)
CREAT SERPL-MCNC: 0.85 MG/DL (ref 0.5–1.05)
ERYTHROCYTE [DISTWIDTH] IN BLOOD BY AUTOMATED COUNT: 13.2 % (ref 11.5–14.5)
GFR SERPL CREATININE-BSD FRML MDRD: 71 ML/MIN/1.73M*2
GLUCOSE BLD MANUAL STRIP-MCNC: 133 MG/DL (ref 74–99)
GLUCOSE BLD MANUAL STRIP-MCNC: 156 MG/DL (ref 74–99)
GLUCOSE SERPL-MCNC: 127 MG/DL (ref 74–99)
HCT VFR BLD AUTO: 44.4 % (ref 36–46)
HGB BLD-MCNC: 14.5 G/DL (ref 12–16)
MCH RBC QN AUTO: 32.2 PG (ref 26–34)
MCHC RBC AUTO-ENTMCNC: 32.7 G/DL (ref 32–36)
MCV RBC AUTO: 99 FL (ref 80–100)
NRBC BLD-RTO: 0 /100 WBCS (ref 0–0)
PLATELET # BLD AUTO: 205 X10*3/UL (ref 150–450)
POTASSIUM SERPL-SCNC: 4 MMOL/L (ref 3.5–5.3)
RBC # BLD AUTO: 4.5 X10*6/UL (ref 4–5.2)
SODIUM SERPL-SCNC: 137 MMOL/L (ref 136–145)
WBC # BLD AUTO: 7.1 X10*3/UL (ref 4.4–11.3)

## 2024-01-05 PROCEDURE — 2500000002 HC RX 250 W HCPCS SELF ADMINISTERED DRUGS (ALT 637 FOR MEDICARE OP, ALT 636 FOR OP/ED): Performed by: INTERNAL MEDICINE

## 2024-01-05 PROCEDURE — 2500000002 HC RX 250 W HCPCS SELF ADMINISTERED DRUGS (ALT 637 FOR MEDICARE OP, ALT 636 FOR OP/ED)

## 2024-01-05 PROCEDURE — 93246 EXT ECG>7D<15D RECORDING: CPT

## 2024-01-05 PROCEDURE — 9420000001 HC RT PATIENT EDUCATION 5 MIN

## 2024-01-05 PROCEDURE — 93248 EXT ECG>7D<15D REV&INTERPJ: CPT | Performed by: STUDENT IN AN ORGANIZED HEALTH CARE EDUCATION/TRAINING PROGRAM

## 2024-01-05 PROCEDURE — 2500000001 HC RX 250 WO HCPCS SELF ADMINISTERED DRUGS (ALT 637 FOR MEDICARE OP): Performed by: INTERNAL MEDICINE

## 2024-01-05 PROCEDURE — 85027 COMPLETE CBC AUTOMATED: CPT | Performed by: INTERNAL MEDICINE

## 2024-01-05 PROCEDURE — 99232 SBSQ HOSP IP/OBS MODERATE 35: CPT

## 2024-01-05 PROCEDURE — 82947 ASSAY GLUCOSE BLOOD QUANT: CPT

## 2024-01-05 PROCEDURE — 2500000001 HC RX 250 WO HCPCS SELF ADMINISTERED DRUGS (ALT 637 FOR MEDICARE OP)

## 2024-01-05 PROCEDURE — 80048 BASIC METABOLIC PNL TOTAL CA: CPT | Performed by: INTERNAL MEDICINE

## 2024-01-05 PROCEDURE — 2500000004 HC RX 250 GENERAL PHARMACY W/ HCPCS (ALT 636 FOR OP/ED)

## 2024-01-05 PROCEDURE — 36415 COLL VENOUS BLD VENIPUNCTURE: CPT | Performed by: INTERNAL MEDICINE

## 2024-01-05 PROCEDURE — 99239 HOSP IP/OBS DSCHRG MGMT >30: CPT | Performed by: HOSPITALIST

## 2024-01-05 PROCEDURE — RXMED WILLOW AMBULATORY MEDICATION CHARGE

## 2024-01-05 RX ORDER — DAPAGLIFLOZIN 10 MG/1
5 TABLET, FILM COATED ORAL ONCE
Status: DISCONTINUED | OUTPATIENT
Start: 2024-01-05 | End: 2024-01-05

## 2024-01-05 RX ORDER — LOSARTAN POTASSIUM 50 MG/1
25 TABLET ORAL DAILY
Qty: 15 TABLET | Refills: 0 | Status: SHIPPED | OUTPATIENT
Start: 2024-01-06 | End: 2024-02-01 | Stop reason: SDUPTHER

## 2024-01-05 RX ORDER — SPIRONOLACTONE 25 MG/1
25 TABLET ORAL DAILY
Status: DISCONTINUED | OUTPATIENT
Start: 2024-01-06 | End: 2024-01-05 | Stop reason: HOSPADM

## 2024-01-05 RX ORDER — AMIODARONE HYDROCHLORIDE 200 MG/1
200 TABLET ORAL DAILY
Qty: 30 TABLET | Refills: 0
Start: 2024-01-11 | End: 2024-02-12 | Stop reason: SDUPTHER

## 2024-01-05 RX ORDER — SPIRONOLACTONE 25 MG/1
25 TABLET ORAL DAILY
Qty: 30 TABLET | Refills: 0 | Status: SHIPPED | OUTPATIENT
Start: 2024-01-06 | End: 2024-02-05 | Stop reason: SDUPTHER

## 2024-01-05 RX ORDER — AMIODARONE HYDROCHLORIDE 200 MG/1
TABLET ORAL
Qty: 54 TABLET | Refills: 0 | Status: SHIPPED | OUTPATIENT
Start: 2024-01-05 | End: 2024-01-18 | Stop reason: WASHOUT

## 2024-01-05 RX ORDER — DILTIAZEM HYDROCHLORIDE 60 MG/1
60 TABLET, FILM COATED ORAL EVERY 6 HOURS
Qty: 120 TABLET | Refills: 0 | Status: SHIPPED | OUTPATIENT
Start: 2024-01-05 | End: 2024-01-18 | Stop reason: WASHOUT

## 2024-01-05 RX ADMIN — APIXABAN 5 MG: 5 TABLET, FILM COATED ORAL at 08:25

## 2024-01-05 RX ADMIN — METFORMIN HYDROCHLORIDE 500 MG: 500 TABLET ORAL at 08:24

## 2024-01-05 RX ADMIN — DILTIAZEM HYDROCHLORIDE 60 MG: 60 TABLET ORAL at 04:17

## 2024-01-05 RX ADMIN — METOPROLOL TARTRATE 25 MG: 25 TABLET, FILM COATED ORAL at 08:25

## 2024-01-05 RX ADMIN — HYDROCHLOROTHIAZIDE 12.5 MG: 12.5 CAPSULE ORAL at 08:25

## 2024-01-05 RX ADMIN — AMIODARONE HYDROCHLORIDE 400 MG: 200 TABLET ORAL at 08:24

## 2024-01-05 RX ADMIN — CYANOCOBALAMIN TAB 1000 MCG 1000 MCG: 1000 TAB at 08:25

## 2024-01-05 RX ADMIN — DILTIAZEM HYDROCHLORIDE 60 MG: 60 TABLET ORAL at 11:13

## 2024-01-05 RX ADMIN — LOSARTAN POTASSIUM 50 MG: 50 TABLET, FILM COATED ORAL at 08:25

## 2024-01-05 RX ADMIN — SPIRONOLACTONE 12.5 MG: 25 TABLET ORAL at 08:25

## 2024-01-05 ASSESSMENT — PAIN - FUNCTIONAL ASSESSMENT: PAIN_FUNCTIONAL_ASSESSMENT: 0-10

## 2024-01-05 ASSESSMENT — COGNITIVE AND FUNCTIONAL STATUS - GENERAL
MOBILITY SCORE: 24
DAILY ACTIVITIY SCORE: 24

## 2024-01-05 ASSESSMENT — PAIN SCALES - GENERAL: PAINLEVEL_OUTOF10: 0 - NO PAIN

## 2024-01-05 NOTE — CARE PLAN
The patient's goals for the shift include      The clinical goals for the shift include HR <100 with cardiac medications throughout shift.

## 2024-01-05 NOTE — NURSING NOTE
Discharge Note: 1/5/2023 1356 Discharge instructions and pt responsibilities reviewed with pt and copy given. A-fib education reviewed with pt and information sheets given. Pt verbalizes understanding of instructions received, verbalizes understanding of when to seek medical attention, denies any home going or personal care needs. Denies further questions or concerns. Reviewed follow up appts with pt and verbalizes understanding. Holter monitor in place by RT. Jermaine LUNA

## 2024-01-05 NOTE — DISCHARGE INSTR - OTHER ORDERS
Atrial Fibrillation Discharge Instructions    About this topic  With Atrial fibrillation or A-fib, the top chambers of your heart are beating very fast. The top 2 chambers of your heart are called atria. They pump blood into the larger bottom chambers, which pump blood to your lungs and the rest of your body. In A-fib, your heart beats abnormally and the top chambers stop pumping blood as strongly as normal. When this happens, your blood can form clots. Sometimes, the clots can travel to your brain and cause a stroke.  Your heart has an electrical system that controls each heartbeat. The signal starts at the top of the heart and moves to the bottom. This signal tells your heart to squeeze and pump blood. The signal repeats with each heartbeat to tell the chambers to beat in a coordinated way. This allows the heart to pump blood effectively to your lungs and the rest of the body.    There are times when the electrical signals do not start from the right place or move through your heart as they should. Then the heart does not beat in a normal way, at a normal rate of 60 to 100 beats a minute, and is not able to pump blood out to the body well. Atrial fibrillation is the most common abnormal heart rhythm.    You may need to take medicines to treat your A-fib. These can include medicines to help control the rhythm and speed of your heartbeat, and medicines to keep clots from forming. If medicines do not work, you may need a procedure to treat your A-fib.  What care is needed at home?  Ask your doctor what you need to do when you go home. Make sure you ask questions if you do not understand what the doctor says.  If you have high blood pressure, follow your regular doctor’s orders to keep your blood pressure under control.  Take all your medicines as ordered.  If your doctor gave you instructions for how to check your pulse at home, make sure you know how to do this and when to call the doctor.  If you smoke, try to  quit. Your doctor or nurse can help.  Try to get some physical activity each day. Even gentle activity, like walking, is good for your health.  Try to avoid or limit alcohol and caffeine.  Do not use cocaine, amphetamines, or other illegal drugs. They can make your heart beat faster and damage your heart.  What follow-up care is needed?  This illness needs to be watched closely. Your doctor will ask you to make visits to the office to check on your progress. Be sure to keep these visits.  What drugs may be needed?  Take your drugs as ordered by your doctor. Be sure to tell your doctor about all the drugs that you are taking, including over-the-counter (OTC) drugs, vitamins, and herbal drugs. Keep a current list of all of these.  Ask your doctor first before taking any other drugs. This will ensure that you do not take any drugs which may make your heart beat faster and may cause damage to your heart.  The doctor may order drugs to:  Control how fast the heart is beating  Treat the abnormal heart rhythm  Thin the blood  Prevent or treat heart failure    Will physical activity be limited?  You may have to limit your activity. Talk to your doctor about the right amount of activity for you.  What changes to diet are needed?  Eat a heart healthy diet. Ask your doctor or dietitian about a proper food plan for you. Limit caffeine and beer, wine, and mixed drinks (alcohol). Alcohol can be a trigger for atrial fibrillation.  What problems could happen?  Heart failure  Stroke  Fainting  When do I need to call the doctor?  Activate the emergency medical system right away if you have signs of a heart attack or stroke. Call 911 in the [place] or [place]. The sooner treatment begins, the better your chances for recovery. Call for emergency help right away if:  You are having so much trouble breathing that you can only say one or two words at a time  You need to sit upright at all times to be able to breathe and or cannot lie  down.  You have signs of a heart attack, which may include:  Severe chest pain, pressure, or discomfort with:  Breathing trouble, sweating, upset stomach, or cold, clammy skin  Pain in your arms, back, or jaw  Worse pain with activity like walking up stairs  You have signs of a stroke, which may include:  Numbness or weakness of the face, arm or leg, especially on one side of the body  Confusion, trouble speaking or understanding  Trouble seeing in one or both eyes  Trouble walking, dizziness, loss of balance or coordination  Severe headache with no known cause  Call your regular doctor if:  You have trouble breathing when talking or sitting still.  You feel your heart racing and it does not stop after awhile (for example, an hour).  You are lightheaded or more tired than normal.  Helpful tips  You may need to have your blood tested often if you are taking blood thinners.  Let your doctor know if you are having problems with bruising or bleeding.  Take all of your drugs as ordered by your doctor, even if you are feeling fine.  Teach Back: Helping You Understand  The Teach Back Method helps you understand the information we are giving you. After you talk with the staff, tell them in your own words what you learned. This helps to make sure the staff has described each thing clearly. It also helps to explain things that may have been confusing. Before going home, make sure you can do these:  I can tell you about my condition.  I can tell you why I need to check with my doctor before taking any drugs.  I can tell you what I will do if I have a very fast heartbeat, chest pain, or signs of a stroke.  Last Reviewed Date  2021-06-15

## 2024-01-05 NOTE — DISCHARGE SUMMARY
Discharge Diagnosis  Atrial fibrillation with RVR (CMS/[facility])  Hypertension  Hyperlipidemia  Non-insulin-dependent diabetes mellitus  Obesity  Vitamin D deficiency  Issues Requiring Follow-Up  A-fib with RVR.  Chronic systolic congestive heart failure, EF 35%.  Discharge Meds     Your medication list        START taking these medications        Instructions Last Dose Given Next Dose Due   amiodarone 400 mg tablet  Commonly known as: Pacerone  Start taking on: January 5, 2024      Take 1 tablet (400 mg) by mouth 2 times a day for 6 days, THEN 0.5 tablets (200 mg) once daily.       amiodarone 200 mg tablet  Commonly known as: Pacerone  Start taking on: January 11, 2024      Take 1 tablet (200 mg) by mouth once daily. Do not start before January 11, 2024.       dilTIAZem 60 mg immediate release tablet  Commonly known as: Cardizem      Take 1 tablet (60 mg) by mouth every 6 hours.       losartan 50 mg tablet  Commonly known as: Cozaar  Start taking on: January 6, 2024      Take 0.5 tablets (25 mg) by mouth once daily. Do not start before January 6, 2024.       spironolactone 25 mg tablet  Commonly known as: Aldactone  Start taking on: January 6, 2024      Take 1 tablet (25 mg) by mouth once daily. Do not start before January 6, 2024.              CONTINUE taking these medications        Instructions Last Dose Given Next Dose Due   acetaminophen 500 mg tablet  Commonly known as: Tylenol           apixaban 5 mg tablet  Commonly known as: Eliquis           atorvastatin 20 mg tablet  Commonly known as: Lipitor      TAKE 1 TABLET BY MOUTH EVERY DAY       cholecalciferol 25 MCG (1000 UT) tablet  Commonly known as: Vitamin D-3           cyanocobalamin 1,000 mcg tablet  Commonly known as: Vitamin B-12           metFORMIN 500 mg tablet  Commonly known as: Glucophage      TAKE 1 TABLET BY MOUTH EVERY DAY       metoprolol tartrate 25 mg tablet  Commonly known as: Lopressor      Take 1 tablet by mouth 2 times a day.              STOP  taking these medications      losartan-hydrochlorothiazide 50-12.5 mg tablet  Commonly known as: Hyzaar                  Where to Get Your Medications        These medications were sent to [facility]/pharmacy #[id number] - [address] AT INTERSECTION OF [address] [address]      Phone: [phone]   amiodarone 400 mg tablet  dilTIAZem 60 mg immediate release tablet  losartan 50 mg tablet  spironolactone 25 mg tablet       Information about where to get these medications is not yet available    Ask your nurse or doctor about these medications  amiodarone 200 mg tablet         Test Results Pending At Discharge  Pending Labs       No current pending labs.            Hospital Course  Graciela Norman is a 78 y.o. female with a significant past medical history of paroxysmal atrial fibrillation on Eliquis, supposed to be on Lopressor 25 mg twice daily, apparently patient weaned herself off from Lopressor recently, patient also has a significant history of hypertension hyperlipidemia, obesity, non-insulin-dependent diabetes mellitus, vitamin D deficiency today patient was seen by her cardiologist in the office, noticed that the patient is in atrial fibrillation with RVR, requested and sent patient to the emergency room for further evaluation.  In the ER patient is asymptomatic denies any chest pain or palpitations, denies any abdominal pain nausea or vomitings, no fever chills or rigors  Denies any urinary symptoms,.  Workup showed patient in atrial fibrillation with RVR electrolytes within normal limits, patient received 20 mg x2 of Cardizem with no improvement in heart rate, later patient was started on Cardizem drip and admitting patient to ICU for further management.  Patient was admitted and started on Cardizem drip in ER, switched to oral Cardizem 60 mg every 6 hours.  Added on amiodarone by cardiology.  Continued on metoprolol 25 mg twice a day.  Patient was on  Eliquis for chronic anticoagulation.  Patient during the course has improved in rate, labile from  now.  Asymptomatic.  Echo showed EF 35%, seen by cardiology, added on Aldactone.  Continue with losartan, because of the low soft blood pressures, dosage was decreased to 25 mg daily.  Patient also added on Farxiga.  Continue on home metformin for type 2 diabetes.  Her other medical conditions remained to be stable.  Patient also started on 14-day monitor.  Patient will have a follow-up with cardiology in 2 weeks.  Follow-up with PCP in 1 week.  Pertinent Physical Exam At Time of Discharge  Physical Exam  General: No distress  Neck: Supple  HEENT: Normocephalic atraumatic pupils equal react light.  Heart:  irregularly irregular, rate controlled and 80 on monitor.  No murmur, gallop, rub.  Lungs: Clear to auscultation bilaterally.  Abdomen: Nondistended nontender bowel sounds are present  Extremities; no clubbing, cyanosis, edema.  Peripheral pulses +2.  Neuro: No focal deficits  Outpatient Follow-Up  Future Appointments   Date Time Provider Department Center   1/23/2024 10:40 AM Becky Gusman MD [id number] [facility]   6/18/2024  1:00 PM Stefan Trotter MD [id number] [facility]     Follow-up with cardiology in 2 weeks.    Total discharge time 40 minutes.    Shania Jaime MD

## 2024-01-05 NOTE — CARE PLAN
The patient's goals for the shift include      The clinical goals for the shift include HR < 100 off cardizem drip this shift    Problem: Pain - Adult  Goal: Verbalizes/displays adequate comfort level or baseline comfort level  1/5/2024 0520 by Floridalma Gomez RN  Outcome: Progressing  1/5/2024 0520 by Floridalma Gomez RN  Outcome: Progressing     Problem: Safety - Adult  Goal: Free from fall injury  1/5/2024 0520 by Floridalma Gomez RN  Outcome: Progressing  1/5/2024 0520 by Floridalma Gomez RN  Outcome: Progressing     Problem: Discharge Planning  Goal: Discharge to home or other facility with appropriate resources  1/5/2024 0520 by Floridalma Gomez RN  Outcome: Progressing  1/5/2024 0520 by Floridalma Gomez RN  Outcome: Progressing     Problem: Chronic Conditions and Co-morbidities  Goal: Patient's chronic conditions and co-morbidity symptoms are monitored and maintained or improved  1/5/2024 0520 by Floridalma Gomez RN  Outcome: Progressing  1/5/2024 0520 by Floridalma Gomez RN  Outcome: Progressing     Problem: Diabetes  Goal: Achieve decreasing blood glucose levels by end of shift  1/5/2024 0520 by Floridalma Gomez RN  Outcome: Progressing  1/5/2024 0520 by Floridalma Gomez RN  Outcome: Progressing  Goal: Increase stability of blood glucose readings by end of shift  1/5/2024 0520 by Floridalma Gomez RN  Outcome: Progressing  1/5/2024 0520 by Floridalma Gomez RN  Outcome: Progressing  Goal: Decrease in ketones present in urine by end of shift  1/5/2024 0520 by Floridalma Gomez RN  Outcome: Progressing  1/5/2024 0520 by Floridalma Gomez RN  Outcome: Progressing  Goal: Maintain electrolyte levels within acceptable range throughout shift  1/5/2024 0520 by Floridalma Gomez RN  Outcome: Progressing  1/5/2024 0520 by Floridalma Gomez RN  Outcome: Progressing  Goal: Maintain glucose levels >70mg/dl to <250mg/dl throughout shift  1/5/2024 0520 by Floridalma Gomez RN  Outcome: Progressing  1/5/2024 0520 by Floridalma Gomez RN  Outcome: Progressing  Goal: No changes in neurological exam by  end of shift  1/5/2024 0520 by Floridalma Gomez RN  Outcome: Progressing  1/5/2024 0520 by Floridalma Gomez RN  Outcome: Progressing  Goal: Learn about and adhere to nutrition recommendations by end of shift  1/5/2024 0520 by Floridalma Gomez RN  Outcome: Progressing  1/5/2024 0520 by Floridalma Gomez RN  Outcome: Progressing  Goal: Vital signs within normal range for age by end of shift  1/5/2024 0520 by Floridalma Gomez RN  Outcome: Progressing  1/5/2024 0520 by Floridalma Gomez RN  Outcome: Progressing  Goal: Increase self care and/or family involovement by end of shift  1/5/2024 0520 by Floridalma Gomez RN  Outcome: Progressing  1/5/2024 0520 by Floridalma Gomez RN  Outcome: Progressing  Goal: Receive DSME education by end of shift  1/5/2024 0520 by Floridalma Gomez RN  Outcome: Progressing  1/5/2024 0520 by Floridalma Gomez RN  Outcome: Progressing     Problem: Pain  Goal: My pain/discomfort is manageable  1/5/2024 0520 by Floridalma Gomez RN  Outcome: Progressing  1/5/2024 0520 by Floridalma Gomez RN  Outcome: Progressing     Problem: Safety  Goal: Patient will be injury free during hospitalization  1/5/2024 0520 by Floridalma Gomez RN  Outcome: Progressing  1/5/2024 0520 by Floridalma Gomez RN  Outcome: Progressing  Goal: I will remain free of falls  1/5/2024 0520 by Floridalma Gomez RN  Outcome: Progressing  1/5/2024 0520 by Floridalma Gomez RN  Outcome: Progressing     Problem: Daily Care  Goal: Daily care needs are met  1/5/2024 0520 by Floridalma Gomez RN  Outcome: Progressing  1/5/2024 0520 by Floridalma Gomez RN  Outcome: Progressing     Problem: Psychosocial Needs  Goal: Demonstrates ability to cope with hospitalization/illness  1/5/2024 0520 by Floridalma Gomez RN  Outcome: Progressing  1/5/2024 0520 by Floridalma Gomez RN  Outcome: Progressing  Goal: Collaborate with me, my family, and caregiver to identify my specific goals  1/5/2024 0520 by Floridalma Gomez RN  Outcome: Progressing  1/5/2024 0520 by Floridalma Gomez RN  Outcome: Progressing     Problem: Discharge Barriers  Goal: My  discharge needs are met  1/5/2024 0520 by Floridalma Gomez RN  Outcome: Progressing  1/5/2024 0520 by Floridalma Gomez RN  Outcome: Progressing     Problem: Respiratory  Goal: Clear secretions with interventions this shift  1/5/2024 0520 by Floridalma Gomez RN  Outcome: Progressing  1/5/2024 0520 by Floridalma Gomez RN  Outcome: Progressing  Goal: Minimize anxiety/maximize coping throughout shift  1/5/2024 0520 by Floridalma Gomez RN  Outcome: Progressing  1/5/2024 0520 by Floridalma Gomez RN  Outcome: Progressing  Goal: Minimal/no exertional discomfort or dyspnea this shift  1/5/2024 0520 by Floridalma Gomez RN  Outcome: Progressing  1/5/2024 0520 by Floridalma Gomez RN  Outcome: Progressing  Goal: No signs of respiratory distress (eg. Use of accessory muscles. Peds grunting)  1/5/2024 0520 by Floridalma Gomez RN  Outcome: Progressing  1/5/2024 0520 by Floridalma Gomez RN  Outcome: Progressing  Goal: Patent airway maintained this shift  1/5/2024 0520 by Floridalma Gomez RN  Outcome: Progressing  1/5/2024 0520 by Floridalma Gomez RN  Outcome: Progressing  Goal: Tolerate mechanical ventilation evidenced by VS/agitation level this shift  1/5/2024 0520 by Floridalma Gomez RN  Outcome: Progressing  1/5/2024 0520 by Floridalma Gomez RN  Outcome: Progressing  Goal: Tolerate pulmonary toileting this shift  1/5/2024 0520 by Floridalma Gomez RN  Outcome: Progressing  1/5/2024 0520 by Floridalma Gomez RN  Outcome: Progressing  Goal: Verbalize decreased shortness of breath this shift  1/5/2024 0520 by Floridalma Gomez RN  Outcome: Progressing  1/5/2024 0520 by Floridalma Gomez RN  Outcome: Progressing  Goal: Wean oxygen to maintain O2 saturation per order/standard this shift  1/5/2024 0520 by Floridalam Gomez RN  Outcome: Progressing  1/5/2024 0520 by Floridalma Gomez RN  Outcome: Progressing  Goal: Increase self care and/or family involvement in next 24 hours  1/5/2024 0520 by Floridalma Gomez RN  Outcome: Progressing  1/5/2024 0520 by Floridalma Gomez RN  Outcome: Progressing     Problem:  Arrythmia/Dysrhythmia  Goal: Lab values return to normal range  1/5/2024 0520 by Floridalma Gomez RN  Outcome: Progressing  1/5/2024 0520 by Floridalma Gomez RN  Outcome: Progressing  Goal: No evidence of post procedure complications  1/5/2024 0520 by Floridalma Gomez RN  Outcome: Progressing  1/5/2024 0520 by Floridalma Gomez RN  Outcome: Progressing  Goal: Promote self management  1/5/2024 0520 by Floridalma Gomez RN  Outcome: Progressing  1/5/2024 0520 by Floridalma Gomez RN  Outcome: Progressing  Goal: Serial ECG will return to baseline  1/5/2024 0520 by Floridalma Gomez RN  Outcome: Progressing  1/5/2024 0520 by Floridalma Gomez RN  Outcome: Progressing  Goal: Verbalize understanding of procedures/devices  1/5/2024 0520 by Floridalma Gomez RN  Outcome: Progressing  1/5/2024 0520 by Floridalma Gomez RN  Outcome: Progressing  Goal: Vital signs return to baseline  1/5/2024 0520 by Floridalma Gomez RN  Outcome: Progressing  1/5/2024 0520 by Floridalma Gomez RN  Outcome: Progressing  Goal: Care and maintenance of device (specify)  1/5/2024 0520 by Floridalma Gomez RN  Outcome: Progressing  1/5/2024 0520 by Floridalma Gomez RN  Outcome: Progressing

## 2024-01-05 NOTE — CARE PLAN
Care Transitions: Patient is medically ready for discharge today per discussion in care round meeting this AM. Met with patient at bedside. Verified her plan is to return home and denies any needs or in home services. No further needs anticipated from care transitions. Care team available upon request. Janice Sainz RN/TCC

## 2024-01-05 NOTE — NURSING NOTE
Discharge Note: 1/5/2024 1512 Discharged via wheelchair to private car accompanied by PCT, personal belongings taken with pt, no distress noted, no complaints voiced. Jermaine LUNA

## 2024-01-05 NOTE — PROGRESS NOTES
"Subjective Data:  Patient sitting up in her chair awaiting breakfast. She reports feeling \"woozy\" last night. Patient aware of new medications added to her regimen and the importance of establishing care with us after discharge.     Overnight Events:    None     Objective Data:  Last Recorded Vitals:  Vitals:    01/04/24 1844 01/04/24 2300 01/05/24 0300 01/05/24 0739   BP:  97/78 100/89 126/84   BP Location:  Left arm  Left arm   Patient Position:  Lying  Lying   Pulse:  90 104 99   Resp:  (!) 30 20 19   Temp:  36.3 °C (97.3 °F) 36.3 °C (97.3 °F) 36.3 °C (97.3 °F)   TempSrc:  Temporal  Temporal   SpO2: 92% 94% 92% 91%   Weight:       Height:           Last Labs:  CBC - 1/5/2024:  4:30 AM  7.1 14.5 205    44.4      CMP - 1/5/2024:  4:30 AM  9.0 7.3 21 --- 1.8   3.3 4.1 23 68      PTT - No results in last year.  _   _ _     TROPHS   Date/Time Value Ref Range Status   01/02/2024 01:11 PM 33 0 - 13 ng/L Final     BNP   Date/Time Value Ref Range Status   01/02/2024 01:11  0 - 99 pg/mL Final   01/02/2021 06:27  0 - 99 pg/mL Final     Comment:     .  <100 pg/mL - Heart failure unlikely  100-299 pg/mL - Intermediate probability of acute heart  .               failure exacerbation. Correlate with clinical  .               context and patient history.    >=300 pg/mL - Heart Failure likely. Correlate with clinical  .               context and patient history.  BNP testing is performed using different testing   methodology at [facility] than at other   [facility] hospitals. Direct result comparisons should   only be made within the same method.       HGBA1C   Date/Time Value Ref Range Status   08/11/2023 11:08 AM 7.0 % Final     Comment:          Diagnosis of Diabetes-Adults   Non-Diabetic: < or = 5.6%   Increased risk for developing diabetes: 5.7-6.4%   Diagnostic of diabetes: > or = 6.5%  .       Monitoring of Diabetes                Age (y)     Therapeutic Goal (%)   Adults:          >18           " <7.0   Pediatrics:    13-18           <7.5                   7-12           <8.0                   0- 6            7.5-8.5   American Diabetes Association. Diabetes Care 33(S1), Jan 2010.     02/07/2023 11:50 AM 6.7 % Final     Comment:          Diagnosis of Diabetes-Adults   Non-Diabetic: < or = 5.6%   Increased risk for developing diabetes: 5.7-6.4%   Diagnostic of diabetes: > or = 6.5%  .       Monitoring of Diabetes                Age (y)     Therapeutic Goal (%)   Adults:          >18           <7.0   Pediatrics:    13-18           <7.5                   7-12           <8.0                   0- 6            7.5-8.5   American Diabetes Association. Diabetes Care 33(S1), Jan 2010.       VLDL   Date/Time Value Ref Range Status   08/11/2023 12:00 PM 27 0 - 40 mg/dL Final   08/01/2022 12:08 PM 24 0 - 40 mg/dL Final   05/18/2021 11:14 AM 28 0 - 40 mg/dL Final      Last I/O:  No intake/output data recorded.    Past Cardiology Tests (Last 3 Years):  EKG:  ECG 12 Lead 01/03/2024      ECG 12 lead (Ancillary Performed) 01/02/2024    Echo:  Transthoracic Echo (TTE) Complete 01/03/2024    Ejection Fractions:  EF   Date/Time Value Ref Range Status   01/03/2024 02:58 PM 46       Cath:  No results found for this or any previous visit from the past 1095 days.    Stress Test:  No results found for this or any previous visit from the past 1095 days.    Cardiac Imaging:  No results found for this or any previous visit from the past 1095 days.      Inpatient Medications:  Scheduled medications   Medication Dose Route Frequency    acetaminophen  975 mg oral Nightly    [START ON 1/11/2024] amiodarone  200 mg oral Daily    amiodarone  400 mg oral BID    apixaban  5 mg oral BID    atorvastatin  20 mg oral Daily    cholecalciferol  1,000 Units oral Nightly    cyanocobalamin  1,000 mcg oral Daily    dilTIAZem  60 mg oral q6h    hydroCHLOROthiazide  12.5 mg oral Daily    insulin lispro  0-10 Units subcutaneous TID with meals    losartan   50 mg oral Daily    metFORMIN  500 mg oral Daily    metoprolol tartrate  25 mg oral BID    spironolactone  12.5 mg oral Daily     PRN medications   Medication    dextrose 10 % in water (D10W)    dextrose    glucagon    oxygen     Continuous Medications   Medication Dose Last Rate       Physical Exam:  General: awake, alert and oriented. No acute distress.   Skin: Skin is warm, dry and intact without rashes or lesions.  HEENT: normocephalic, atraumatic; conjunctivae are clear without exudates or hemorrhage. Sclera is non-icteric. Eyelids are normal in appearance without swelling or lesions. Hearing intact. Nares are patent bilaterally. Moist mucous membranes.   Cardiovascular: irregularly irregular and fast  Respiratory: bilateral lung sounds clear to auscultations without rales, rhonchi, or wheezes. No accessory muscle use or stridor  Gastrointestinal: non-distended, non-tender  Genitourinary: exam deferred  Musculoskeletal: ROM intact, no deformities  Extremities: pulses palpable bilaterally; no swelling or erythema  Neurological: no focal deficits  Psychiatric: appropriate mood and affect; good judgment and insight       Assessment/Plan     Atrial fibrillation with RVR:  -Resting heart rate continues to be erratic ranging from 80s to 140s; patient asymptomatic during elevated rates  -Echo now showing moderately decreased EF of 35%, most likely due to uncontrolled Afib given her stress test and echo were normal in 2021.   -Continue with GDMT for HFrEF; Continue with ARB (BP has dropped quite a bit with initiation of diltiazem), continue BB, will increase spironolactone to 25mg daily. Recommend initiation of SGLT2i at discharge. Ultimately, patient would benefit from ARNi but due to patient is sensitive to medication, BP has dropped quite a bit with oral diltiazem, and HR continues to be elevated I will focus on rate control.  -Continue diltiazem 60mg every six hours  -Continue Lopressor 25mg twice daily; patient is  adamant she does not want to go any higher on the dose due to adverse effects in the past which resulted in her weaning herself off of the medication  -Continue amio as ordered  -Discussed case with pharmacy and the different routes we can try for her heart rate; Amio will take a while to build up to fully see the results, if we stop the diltiazem and only focus on a BB I do not think her HR will be controlled as evidenced now with both a CCB and BB. The patient continues to decline further up titration of the BB.   -[id number] Score of 4; continue Eliquis 5mg twice daily; no overt bleeding   -7 day monitor ordered at discharge  -Patient scheduled with Dr Woods on 1/18/24 to establish care and discuss further rhythm control options  Peripheral IV 01/02/24 20 G Right Antecubital (Active)   Site Assessment Clean;Dry;Intact 01/05/24 0700   Dressing Status Clean;Dry;Occlusive 01/05/24 0700   Number of days: 3       Code Status:  Full Code    Thank you for allowing me to participate in the care of this patient. Please reach me out if you have any questions or if you need any clarifications regarding the patient's care.          Chandrika Sanchez, APRN-CNP, DNP

## 2024-01-10 PROBLEM — E11.9 TYPE 2 DIABETES MELLITUS (MULTI): Status: ACTIVE | Noted: 2023-08-17

## 2024-01-10 PROBLEM — E11.65 TYPE 2 DIABETES MELLITUS WITH HYPERGLYCEMIA (MULTI): Status: RESOLVED | Noted: 2023-08-17 | Resolved: 2024-01-10

## 2024-01-10 PROBLEM — R92.8 ABNORMAL MAMMOGRAM: Status: ACTIVE | Noted: 2023-06-08

## 2024-01-10 PROBLEM — M79.673 PAIN OF FOOT: Status: ACTIVE | Noted: 2024-01-10

## 2024-01-10 PROBLEM — Z91.199 NONCOMPLIANCE WITH TREATMENT: Status: ACTIVE | Noted: 2024-01-10

## 2024-01-10 PROBLEM — R30.0 DYSURIA: Status: ACTIVE | Noted: 2023-07-28

## 2024-01-18 ENCOUNTER — OFFICE VISIT (OUTPATIENT)
Dept: CARDIOLOGY | Facility: CLINIC | Age: 79
End: 2024-01-18
Payer: MEDICARE

## 2024-01-18 ENCOUNTER — TELEPHONE (OUTPATIENT)
Dept: CARDIOLOGY | Facility: CLINIC | Age: 79
End: 2024-01-18

## 2024-01-18 VITALS
WEIGHT: 210 LBS | OXYGEN SATURATION: 93 % | HEART RATE: 52 BPM | SYSTOLIC BLOOD PRESSURE: 114 MMHG | DIASTOLIC BLOOD PRESSURE: 62 MMHG | BODY MASS INDEX: 38.64 KG/M2 | HEIGHT: 62 IN

## 2024-01-18 DIAGNOSIS — I10 BENIGN HYPERTENSION: Primary | ICD-10-CM

## 2024-01-18 DIAGNOSIS — I50.42 CHRONIC COMBINED SYSTOLIC AND DIASTOLIC HEART FAILURE (MULTI): ICD-10-CM

## 2024-01-18 DIAGNOSIS — I48.19 PERSISTENT ATRIAL FIBRILLATION (MULTI): ICD-10-CM

## 2024-01-18 DIAGNOSIS — I48.91 ATRIAL FIBRILLATION WITH RVR (MULTI): ICD-10-CM

## 2024-01-18 DIAGNOSIS — I48.0 PAROXYSMAL ATRIAL FIBRILLATION (MULTI): ICD-10-CM

## 2024-01-18 PROCEDURE — 1126F AMNT PAIN NOTED NONE PRSNT: CPT | Performed by: STUDENT IN AN ORGANIZED HEALTH CARE EDUCATION/TRAINING PROGRAM

## 2024-01-18 PROCEDURE — 1159F MED LIST DOCD IN RCRD: CPT | Performed by: STUDENT IN AN ORGANIZED HEALTH CARE EDUCATION/TRAINING PROGRAM

## 2024-01-18 PROCEDURE — 3074F SYST BP LT 130 MM HG: CPT | Performed by: STUDENT IN AN ORGANIZED HEALTH CARE EDUCATION/TRAINING PROGRAM

## 2024-01-18 PROCEDURE — 99214 OFFICE O/P EST MOD 30 MIN: CPT | Performed by: STUDENT IN AN ORGANIZED HEALTH CARE EDUCATION/TRAINING PROGRAM

## 2024-01-18 PROCEDURE — 3078F DIAST BP <80 MM HG: CPT | Performed by: STUDENT IN AN ORGANIZED HEALTH CARE EDUCATION/TRAINING PROGRAM

## 2024-01-18 PROCEDURE — 93000 ELECTROCARDIOGRAM COMPLETE: CPT | Performed by: STUDENT IN AN ORGANIZED HEALTH CARE EDUCATION/TRAINING PROGRAM

## 2024-01-18 PROCEDURE — 1160F RVW MEDS BY RX/DR IN RCRD: CPT | Performed by: STUDENT IN AN ORGANIZED HEALTH CARE EDUCATION/TRAINING PROGRAM

## 2024-01-18 PROCEDURE — 1036F TOBACCO NON-USER: CPT | Performed by: STUDENT IN AN ORGANIZED HEALTH CARE EDUCATION/TRAINING PROGRAM

## 2024-01-18 PROCEDURE — 1111F DSCHRG MED/CURRENT MED MERGE: CPT | Performed by: STUDENT IN AN ORGANIZED HEALTH CARE EDUCATION/TRAINING PROGRAM

## 2024-01-18 RX ORDER — DILTIAZEM HYDROCHLORIDE 60 MG/1
60 TABLET, FILM COATED ORAL 2 TIMES DAILY
Qty: 60 TABLET | Refills: 0 | Status: SHIPPED | OUTPATIENT
Start: 2024-01-18 | End: 2024-03-26 | Stop reason: SDUPTHER

## 2024-01-18 NOTE — TELEPHONE ENCOUNTER
Pt notified of Cardioversion on 2/1 with Dr Brock.  OR will call with arrival time.  Pt to hold Diltiazem and Metoprolol the morning of procedure.  Take  rest of meds am of.  Pt advised not to hold or skip any doses of Eliquis.  Labs were completed.

## 2024-01-18 NOTE — H&P (VIEW-ONLY)
"    Cardiac Electrophysiology Office Visit     Referred by Dr. Gusman, Becky PRITCHARD MD for   Chief Complaint   Patient presents with    Hospital Follow-up     HPI:  Graciela Norman is a 78 y.o. year old female patient with h/o HTN, HLD, DM (not on insulin), Obesity, Atrial fibrillation presenting today to establish care    Has h/o palpitations, but recently episodes of SOB and persistent palpitations, and pt was sent to ER. She reports that she has been on Apixaban since 2020.     Objective  Current Outpatient Medications   Medication Instructions    acetaminophen (TYLENOL) 1,000 mg, oral, Nightly    amiodarone (Pacerone) 200 mg tablet Take 2 tablets (400 mg) by mouth 2 times a day for 6 days, THEN 1 tablet (200 mg) once daily.    amiodarone (PACERONE) 200 mg, oral, Daily    apixaban (ELIQUIS) 5 mg, oral, 2 times daily, States takes 2 in am and 1 at night    atorvastatin (LIPITOR) 20 mg, oral, Daily    cholecalciferol (VITAMIN D-3) 1,000 Units, oral, Nightly    cyanocobalamin (Vitamin B-12) 1,000 mcg tablet 1 tablet, oral, Daily    dilTIAZem (CARDIZEM) 60 mg, oral, Every 6 hours    Jardiance 10 mg, oral, Daily    losartan (COZAAR) 25 mg, oral, Daily    metFORMIN (GLUCOPHAGE) 500 mg, oral, Daily    metoprolol tartrate (LOPRESSOR) 25 mg, oral, 2 times daily    spironolactone (ALDACTONE) 25 mg, oral, Daily         Visit Vitals  /62   Pulse 52   Ht 1.575 m (5' 2\")   Wt 95.3 kg (210 lb)   SpO2 93%   BMI 38.41 kg/m²   Smoking Status Former   BSA 2.04 m²      Physical Exam  Constitutional:       Appearance: Normal appearance.   HENT:      Head: Normocephalic.   Cardiovascular:      Rate and Rhythm: Normal rate. Rhythm irregular.      Pulses: Normal pulses.   Pulmonary:      Effort: No respiratory distress.      Breath sounds: No wheezing.   Skin:     General: Skin is warm and dry.      Capillary Refill: Capillary refill takes less than 2 seconds.   Neurological:      Mental Status: She is alert.   Psychiatric:         " Mood and Affect: Mood normal.        My Interpretation of Reviewed Study(s):  Echo (Jan 2024): Reduced LV function with an EF of 35% with global hypokinesis.  Severely dilated left atrium moderate right atrium.  Moderate MR.  No pericardial effusion noted.  [id number] Score  Age >= 75: 2   Sex Female: 1   CHF History Yes: 1   HTN Yes: 1   Stroke/TIA/Thromboembolism No: 0   Vascular Dz: CAD/PAD/Aortic Plaque No: 0   DM Yes: 1   Total Score 6     Assessment/Plan   #Persistent atrial fibrillation  AF Dx History: Seen on ECG since 2021; h/o Cardioversion: No  AAD Use: Amiodarone 200mg (current); Anticoagulation use: Apixaban 5mg BID (current); h/o Ablation: None; [id number] Score: 6  -c/w AC: Apixaban 5mg BID  -Change Diltiazem 60mg BID  -c/w Metoprolol tart 25mg BID  -c/w Amiodarone 200mg Daily  -Plan for DCCV to try get pt into SR to allow for rhythm control  -In long term will consider RFA to allow for rhythm control but allow pt to come of Amiodarone    #HFrEF - Etiology unknown   Likely related to tachycardia from atrial fibrillation but given patient's comorbidities (diabetes, pulmonary CAD, age) will likely need to rule out underlying CAD.  If cardiac function recovers when patient is in sinus rhythm better rate controlled then no need for primary prevention ICD however if EF remains low despite goal-directed medical therapy and rhythm control then may need to consider prevention ICD.  -Nuclear stress test  -Likely reevaluate echo in 2 to 3 months after patient has been on rhythm control and has been goal-directed medical therapy  -Refer to cardiology/heart failure Dr. Brock for further evaluation and management of her chronic systolic and diastolic heart failure    Return to Clinic: Patient should return to the EP Clinic in 6-8 weeks    Nilson Woods MD [facility]  Cardiac Electrophysiology  [email or web address]    **Disclaimer: This note was dictated by speech recognition, and every effort has been  made to prevent any error in transcription, however minor errors may be present**

## 2024-01-18 NOTE — PROGRESS NOTES
"    Cardiac Electrophysiology Office Visit     Referred by Dr. Gusman, Becky PRITCHARD MD for   Chief Complaint   Patient presents with    Hospital Follow-up     HPI:  Graciela Norman is a 78 y.o. year old female patient with h/o HTN, HLD, DM (not on insulin), Obesity, Atrial fibrillation presenting today to establish care    Has h/o palpitations, but recently episodes of SOB and persistent palpitations, and pt was sent to ER. She reports that she has been on Apixaban since 2020.     Objective  Current Outpatient Medications   Medication Instructions    acetaminophen (TYLENOL) 1,000 mg, oral, Nightly    amiodarone (Pacerone) 200 mg tablet Take 2 tablets (400 mg) by mouth 2 times a day for 6 days, THEN 1 tablet (200 mg) once daily.    amiodarone (PACERONE) 200 mg, oral, Daily    apixaban (ELIQUIS) 5 mg, oral, 2 times daily, States takes 2 in am and 1 at night    atorvastatin (LIPITOR) 20 mg, oral, Daily    cholecalciferol (VITAMIN D-3) 1,000 Units, oral, Nightly    cyanocobalamin (Vitamin B-12) 1,000 mcg tablet 1 tablet, oral, Daily    dilTIAZem (CARDIZEM) 60 mg, oral, Every 6 hours    Jardiance 10 mg, oral, Daily    losartan (COZAAR) 25 mg, oral, Daily    metFORMIN (GLUCOPHAGE) 500 mg, oral, Daily    metoprolol tartrate (LOPRESSOR) 25 mg, oral, 2 times daily    spironolactone (ALDACTONE) 25 mg, oral, Daily         Visit Vitals  /62   Pulse 52   Ht 1.575 m (5' 2\")   Wt 95.3 kg (210 lb)   SpO2 93%   BMI 38.41 kg/m²   Smoking Status Former   BSA 2.04 m²      Physical Exam  Constitutional:       Appearance: Normal appearance.   HENT:      Head: Normocephalic.   Cardiovascular:      Rate and Rhythm: Normal rate. Rhythm irregular.      Pulses: Normal pulses.   Pulmonary:      Effort: No respiratory distress.      Breath sounds: No wheezing.   Skin:     General: Skin is warm and dry.      Capillary Refill: Capillary refill takes less than 2 seconds.   Neurological:      Mental Status: She is alert.   Psychiatric:         " Mood and Affect: Mood normal.        My Interpretation of Reviewed Study(s):  Echo (Jan 2024): Reduced LV function with an EF of 35% with global hypokinesis.  Severely dilated left atrium moderate right atrium.  Moderate MR.  No pericardial effusion noted.  [id number] Score  Age >= 75: 2   Sex Female: 1   CHF History Yes: 1   HTN Yes: 1   Stroke/TIA/Thromboembolism No: 0   Vascular Dz: CAD/PAD/Aortic Plaque No: 0   DM Yes: 1   Total Score 6     Assessment/Plan   #Persistent atrial fibrillation  AF Dx History: Seen on ECG since 2021; h/o Cardioversion: No  AAD Use: Amiodarone 200mg (current); Anticoagulation use: Apixaban 5mg BID (current); h/o Ablation: None; [id number] Score: 6  -c/w AC: Apixaban 5mg BID  -Change Diltiazem 60mg BID  -c/w Metoprolol tart 25mg BID  -c/w Amiodarone 200mg Daily  -Plan for DCCV to try get pt into SR to allow for rhythm control  -In long term will consider RFA to allow for rhythm control but allow pt to come of Amiodarone    #HFrEF - Etiology unknown   Likely related to tachycardia from atrial fibrillation but given patient's comorbidities (diabetes, pulmonary CAD, age) will likely need to rule out underlying CAD.  If cardiac function recovers when patient is in sinus rhythm better rate controlled then no need for primary prevention ICD however if EF remains low despite goal-directed medical therapy and rhythm control then may need to consider prevention ICD.  -Nuclear stress test  -Likely reevaluate echo in 2 to 3 months after patient has been on rhythm control and has been goal-directed medical therapy  -Refer to cardiology/heart failure Dr. Brock for further evaluation and management of her chronic systolic and diastolic heart failure    Return to Clinic: Patient should return to the EP Clinic in 6-8 weeks    Nilson Woods MD [facility]  Cardiac Electrophysiology  [email or web address]    **Disclaimer: This note was dictated by speech recognition, and every effort has been  made to prevent any error in transcription, however minor errors may be present**

## 2024-01-23 ENCOUNTER — APPOINTMENT (OUTPATIENT)
Dept: PRIMARY CARE | Facility: CLINIC | Age: 79
End: 2024-01-23
Payer: MEDICARE

## 2024-01-29 ENCOUNTER — HOSPITAL ENCOUNTER (OUTPATIENT)
Dept: RADIOLOGY | Facility: HOSPITAL | Age: 79
Discharge: HOME | End: 2024-01-29
Payer: MEDICARE

## 2024-01-29 ENCOUNTER — HOSPITAL ENCOUNTER (OUTPATIENT)
Dept: CARDIOLOGY | Facility: HOSPITAL | Age: 79
Discharge: HOME | End: 2024-01-29
Payer: MEDICARE

## 2024-01-29 DIAGNOSIS — I50.42 CHRONIC COMBINED SYSTOLIC AND DIASTOLIC HEART FAILURE (MULTI): ICD-10-CM

## 2024-01-29 DIAGNOSIS — I48.0 PAROXYSMAL ATRIAL FIBRILLATION (MULTI): ICD-10-CM

## 2024-01-29 LAB — BODY SURFACE AREA: 2.06 M2

## 2024-01-29 PROCEDURE — 3430000001 HC RX 343 DIAGNOSTIC RADIOPHARMACEUTICALS: Performed by: STUDENT IN AN ORGANIZED HEALTH CARE EDUCATION/TRAINING PROGRAM

## 2024-01-29 PROCEDURE — 93016 CV STRESS TEST SUPVJ ONLY: CPT | Performed by: STUDENT IN AN ORGANIZED HEALTH CARE EDUCATION/TRAINING PROGRAM

## 2024-01-29 PROCEDURE — 93017 CV STRESS TEST TRACING ONLY: CPT

## 2024-01-29 PROCEDURE — 93017 CV STRESS TEST TRACING ONLY: CPT | Mod: MUE

## 2024-01-29 PROCEDURE — 2500000004 HC RX 250 GENERAL PHARMACY W/ HCPCS (ALT 636 FOR OP/ED): Performed by: STUDENT IN AN ORGANIZED HEALTH CARE EDUCATION/TRAINING PROGRAM

## 2024-01-29 PROCEDURE — 78452 HT MUSCLE IMAGE SPECT MULT: CPT

## 2024-01-29 PROCEDURE — A9502 TC99M TETROFOSMIN: HCPCS | Performed by: STUDENT IN AN ORGANIZED HEALTH CARE EDUCATION/TRAINING PROGRAM

## 2024-01-29 PROCEDURE — 78452 HT MUSCLE IMAGE SPECT MULT: CPT | Performed by: RADIOLOGY

## 2024-01-29 RX ORDER — REGADENOSON 0.08 MG/ML
0.4 INJECTION, SOLUTION INTRAVENOUS ONCE
Status: COMPLETED | OUTPATIENT
Start: 2024-01-29 | End: 2024-01-29

## 2024-01-29 RX ADMIN — REGADENOSON 0.4 MG: 0.08 INJECTION, SOLUTION INTRAVENOUS at 08:27

## 2024-01-29 RX ADMIN — TETROFOSMIN 34 MILLICURIE: 0.23 INJECTION, POWDER, LYOPHILIZED, FOR SOLUTION INTRAVENOUS at 08:26

## 2024-01-29 RX ADMIN — TETROFOSMIN 11 MILLICURIE: 0.23 INJECTION, POWDER, LYOPHILIZED, FOR SOLUTION INTRAVENOUS at 06:55

## 2024-02-01 ENCOUNTER — HOSPITAL ENCOUNTER (OUTPATIENT)
Dept: OPERATING ROOM | Facility: HOSPITAL | Age: 79
Setting detail: OUTPATIENT SURGERY
Discharge: HOME | End: 2024-02-01
Payer: MEDICARE

## 2024-02-01 ENCOUNTER — HOSPITAL ENCOUNTER (OUTPATIENT)
Dept: CARDIOLOGY | Facility: HOSPITAL | Age: 79
Discharge: HOME | End: 2024-02-01
Payer: MEDICARE

## 2024-02-01 ENCOUNTER — ANESTHESIA EVENT (OUTPATIENT)
Dept: OPERATING ROOM | Facility: HOSPITAL | Age: 79
End: 2024-02-01
Payer: MEDICARE

## 2024-02-01 ENCOUNTER — ANESTHESIA (OUTPATIENT)
Dept: OPERATING ROOM | Facility: HOSPITAL | Age: 79
End: 2024-02-01
Payer: MEDICARE

## 2024-02-01 VITALS
SYSTOLIC BLOOD PRESSURE: 129 MMHG | RESPIRATION RATE: 16 BRPM | OXYGEN SATURATION: 98 % | HEART RATE: 72 BPM | DIASTOLIC BLOOD PRESSURE: 88 MMHG | HEIGHT: 62 IN | WEIGHT: 196.43 LBS | TEMPERATURE: 98 F | BODY MASS INDEX: 36.15 KG/M2

## 2024-02-01 DIAGNOSIS — I48.91 ATRIAL FIBRILLATION WITH RVR (MULTI): ICD-10-CM

## 2024-02-01 DIAGNOSIS — I48.19 PERSISTENT ATRIAL FIBRILLATION (MULTI): ICD-10-CM

## 2024-02-01 LAB — GLUCOSE BLD MANUAL STRIP-MCNC: 166 MG/DL (ref 74–99)

## 2024-02-01 PROCEDURE — 7100000009 HC PHASE TWO TIME - INITIAL BASE CHARGE: Performed by: ANESTHESIOLOGY

## 2024-02-01 PROCEDURE — 7100000010 HC PHASE TWO TIME - EACH INCREMENTAL 1 MINUTE: Performed by: ANESTHESIOLOGY

## 2024-02-01 PROCEDURE — 2500000004 HC RX 250 GENERAL PHARMACY W/ HCPCS (ALT 636 FOR OP/ED): Performed by: ANESTHESIOLOGY

## 2024-02-01 PROCEDURE — 82947 ASSAY GLUCOSE BLOOD QUANT: CPT

## 2024-02-01 PROCEDURE — 93010 ELECTROCARDIOGRAM REPORT: CPT | Performed by: STUDENT IN AN ORGANIZED HEALTH CARE EDUCATION/TRAINING PROGRAM

## 2024-02-01 PROCEDURE — 92960 CARDIOVERSION ELECTRIC EXT: CPT | Performed by: STUDENT IN AN ORGANIZED HEALTH CARE EDUCATION/TRAINING PROGRAM

## 2024-02-01 PROCEDURE — 93005 ELECTROCARDIOGRAM TRACING: CPT

## 2024-02-01 PROCEDURE — 3700000001 HC GENERAL ANESTHESIA TIME - INITIAL BASE CHARGE: Performed by: ANESTHESIOLOGY

## 2024-02-01 PROCEDURE — 3700000002 HC GENERAL ANESTHESIA TIME - EACH INCREMENTAL 1 MINUTE: Performed by: ANESTHESIOLOGY

## 2024-02-01 PROCEDURE — 93005 ELECTROCARDIOGRAM TRACING: CPT | Mod: 59

## 2024-02-01 RX ORDER — SODIUM CHLORIDE, SODIUM LACTATE, POTASSIUM CHLORIDE, CALCIUM CHLORIDE 600; 310; 30; 20 MG/100ML; MG/100ML; MG/100ML; MG/100ML
50 INJECTION, SOLUTION INTRAVENOUS CONTINUOUS
Status: DISCONTINUED | OUTPATIENT
Start: 2024-02-01 | End: 2024-02-02 | Stop reason: HOSPADM

## 2024-02-01 RX ORDER — PROPOFOL 10 MG/ML
INJECTION, EMULSION INTRAVENOUS AS NEEDED
Status: DISCONTINUED | OUTPATIENT
Start: 2024-02-01 | End: 2024-02-01

## 2024-02-01 RX ORDER — LOSARTAN POTASSIUM 50 MG/1
25 TABLET ORAL DAILY
Qty: 15 TABLET | Refills: 0 | Status: SHIPPED | OUTPATIENT
Start: 2024-02-01 | End: 2024-02-26 | Stop reason: SDUPTHER

## 2024-02-01 RX ADMIN — SODIUM CHLORIDE, POTASSIUM CHLORIDE, SODIUM LACTATE AND CALCIUM CHLORIDE 50 ML/HR: 600; 310; 30; 20 INJECTION, SOLUTION INTRAVENOUS at 07:02

## 2024-02-01 RX ADMIN — PROPOFOL 90 MG: 10 INJECTION, EMULSION INTRAVENOUS at 08:22

## 2024-02-01 ASSESSMENT — ENCOUNTER SYMPTOMS
DYSRHYTHMIAS: 1
SHORTNESS OF BREATH: 1

## 2024-02-01 ASSESSMENT — PAIN - FUNCTIONAL ASSESSMENT
PAIN_FUNCTIONAL_ASSESSMENT: 0-10

## 2024-02-01 ASSESSMENT — PAIN SCALES - GENERAL
PAINLEVEL_OUTOF10: 0 - NO PAIN

## 2024-02-01 ASSESSMENT — COLUMBIA-SUICIDE SEVERITY RATING SCALE - C-SSRS
1. IN THE PAST MONTH, HAVE YOU WISHED YOU WERE DEAD OR WISHED YOU COULD GO TO SLEEP AND NOT WAKE UP?: NO
2. HAVE YOU ACTUALLY HAD ANY THOUGHTS OF KILLING YOURSELF?: NO
6. HAVE YOU EVER DONE ANYTHING, STARTED TO DO ANYTHING, OR PREPARED TO DO ANYTHING TO END YOUR LIFE?: NO

## 2024-02-01 NOTE — POST-PROCEDURE NOTE
Direct current cardioversion    Procedures  Direct current cardioversion (85596)    Patient history:  Please refer to the detailed history and physical on the patient's medical chart.     Procedure narrative:  The risks, benefits, and alternatives to the procedure and sedation were explained to the patient, and informed consent was obtained. The patient was in the fasting state. A grounding pad was placed. Self-adhesive anterior-posterior defibrillation pads were applied. A defibrillator was used for monitoring and the defibrillator waveform was set to biphasic. The patient was set up for continuous monitoring of surface 12 lead ECG, capnography, and pulse oximetry. Blood pressure was monitored with automatic cuff measurements. The procedure was performed under IV conscious sedation supplemented with intermittent deep sedation.   Patient reported taking their anticoagulation Apixaban 5mg BID without interruption in the last 4-6 weeks.    When pt had been adequately sedated, they were cardioverted with a  300J biphasic shock.  This restored sinus rhythm which was confirmed by tele and 12-lead ECG.      Summary:  Patient wassuccessfully cardioverted from Sinus rhythm and Atrial fibrillation to Sinus rhythm w/ PACs    Patient Instructions:  - No Driving or making legal decisions for 24 hours  - DO NOT Stop your blood thinner (Apixaban 5mg BID) unless emergency without checking in with your doctor    Follow up:   Tentatively patient will be discharged Today, following bed rest and subsequent ambulation, provided the recovery parameters are appropriate.   Resume AC Apixaban 5mg BID, DO NOT Stop your blood thinner unless emergency without checking in with your doctor.  No driving, alcohol or making legal decisions for 24 hours.  Plan for follow up with patient's cardiologist/electrophysiologist as scheduled    See complete procedural log and parameters.

## 2024-02-01 NOTE — ANESTHESIA POSTPROCEDURE EVALUATION
Patient: Graciela Norman    Procedure Summary       Date: 02/01/24 Room / Location: [facility] OR    Anesthesia Start: 0818 Anesthesia Stop: 0830    Procedure: CARDIOVERSION EXTERNAL Diagnosis: Persistent atrial fibrillation (CMS/HCC)    Scheduled Providers: Mala Pettit RN Responsible Provider: Nolan Garces MD    Anesthesia Type: MAC ASA Status: 3            Anesthesia Type: MAC    Vitals Value Taken Time   Vitals:    02/01/24 0645   BP: (!) 172/107   Pulse: 101   Resp: 18   Temp: 36.1 °C (97 °F)   SpO2: 96%       02/01/24 0830 02/01/24 0830 02/01/24 0830     02/01/24 0830     02/01/24 0830       Anesthesia Post Evaluation    Patient location during evaluation: bedside  Patient participation: complete - patient participated  Level of consciousness: sleepy but conscious  Pain management: adequate  Airway patency: patent  Cardiovascular status: acceptable  Respiratory status: acceptable  Hydration status: acceptable  Postoperative Nausea and Vomiting: none      No notable events documented.

## 2024-02-01 NOTE — INTERVAL H&P NOTE
H&P reviewed. The patient was examined and there are no changes to the H&P.    Patient notes that to the best of her knowledge she has not missed any doses of her Eliquis for the past 3 weeks.  ASA class II.  Mallampati class II.

## 2024-02-01 NOTE — ANESTHESIA PREPROCEDURE EVALUATION
Patient: Graciela Norman    Procedure Information       Date/Time: 02/01/24 0800    Scheduled providers: Mala Pettit RN    Procedure: CARDIOVERSION EXTERNAL    Location: [facility] OR            Relevant Problems   Cardiovascular   (+) Benign hypertension   (+) Chronic combined systolic and diastolic heart failure (CMS/HCC)   (+) Persistent atrial fibrillation (CMS/HCC)      Endocrine   (+) Severe obesity (BMI 35.0-39.9) with comorbidity (CMS/HCC)   (+) Type 2 diabetes mellitus (CMS/HCC)      /Renal   (+) Recurrent UTI      Neuro/Psych   (+) Peripheral neuropathy   (+) Toxic polyneuropathy (CMS/HCC)      Infectious Disease   (+) Recurrent UTI       Clinical information reviewed:   Tobacco  Allergies  Meds   Med Hx  Surg Hx  OB Status  Fam Hx  Soc   Hx        NPO/Void Status  Carbohydrate Drink Given Prior to Surgery? : N  Date of Last Liquid: 02/01/24  Time of Last Liquid: 0600  Date of Last Solid: 01/31/24  Time of Last Solid: 2130  Last Intake Type: Clear fluids  Time of Last Void: 0630         Physical Exam    Airway  Mallampati: II  TM distance: >3 FB  Neck ROM: full     Cardiovascular   Rhythm: irregular  Rate: normal     Dental   (+) upper dentures, lower dentures     Pulmonary    Abdominal        Anesthesia Plan    History of general anesthesia?: yes  History of complications of general anesthesia?: no    ASA 3     MAC     Anesthetic plan and risks discussed with patient.     Anesthesia Evaluation      No history of anesthetic complications   Airway   Mallampati: II  TM distance: >3 FB  Neck ROM: full  Dental    (+) upper dentures and lower dentures    Pulmonary    (+) shortness of breath, sleep apnea  Cardiovascular   (+) hypertension, dysrhythmias    Rhythm: irregular  Rate: normal    Neuro/Psych      GI/Hepatic/Renal      Endo/Other    (+) diabetes mellitus  Abdominal

## 2024-02-02 LAB
ATRIAL RATE: 234 BPM
ATRIAL RATE: 71 BPM
P OFFSET: 173 MS
P ONSET: 127 MS
PR INTERVAL: 194 MS
Q ONSET: 221 MS
Q ONSET: 224 MS
QRS COUNT: 12 BEATS
QRS COUNT: 17 BEATS
QRS DURATION: 68 MS
QRS DURATION: 80 MS
QT INTERVAL: 322 MS
QT INTERVAL: 402 MS
QTC CALCULATION(BAZETT): 423 MS
QTC CALCULATION(BAZETT): 436 MS
QTC FREDERICIA: 386 MS
QTC FREDERICIA: 425 MS
R AXIS: -17 DEGREES
R AXIS: -18 DEGREES
T AXIS: -14 DEGREES
T AXIS: 221 DEGREES
T OFFSET: 382 MS
T OFFSET: 425 MS
VENTRICULAR RATE: 104 BPM
VENTRICULAR RATE: 71 BPM

## 2024-02-03 DIAGNOSIS — E11.65 TYPE 2 DIABETES MELLITUS WITH HYPERGLYCEMIA (MULTI): ICD-10-CM

## 2024-02-05 DIAGNOSIS — I50.20 HEART FAILURE WITH REDUCED EJECTION FRACTION (MULTI): ICD-10-CM

## 2024-02-05 DIAGNOSIS — I48.91 ATRIAL FIBRILLATION WITH RVR (MULTI): ICD-10-CM

## 2024-02-05 RX ORDER — SPIRONOLACTONE 25 MG/1
25 TABLET ORAL DAILY
Qty: 30 TABLET | Refills: 3 | Status: SHIPPED | OUTPATIENT
Start: 2024-02-05 | End: 2024-05-29

## 2024-02-05 RX ORDER — METFORMIN HYDROCHLORIDE 500 MG/1
500 TABLET ORAL DAILY
Qty: 90 TABLET | Refills: 1 | Status: SHIPPED | OUTPATIENT
Start: 2024-02-05

## 2024-02-07 ENCOUNTER — LAB (OUTPATIENT)
Dept: LAB | Facility: LAB | Age: 79
End: 2024-02-07
Payer: MEDICARE

## 2024-02-07 DIAGNOSIS — R79.89 LOW VITAMIN D LEVEL: ICD-10-CM

## 2024-02-07 DIAGNOSIS — E11.65 TYPE 2 DIABETES MELLITUS WITH HYPERGLYCEMIA, WITHOUT LONG-TERM CURRENT USE OF INSULIN (MULTI): ICD-10-CM

## 2024-02-07 LAB
25(OH)D3 SERPL-MCNC: 26 NG/ML (ref 30–100)
ALBUMIN SERPL BCP-MCNC: 4.3 G/DL (ref 3.4–5)
ALP SERPL-CCNC: 77 U/L (ref 33–136)
ALT SERPL W P-5'-P-CCNC: 28 U/L (ref 7–45)
ANION GAP SERPL CALC-SCNC: 13 MMOL/L (ref 10–20)
AST SERPL W P-5'-P-CCNC: 23 U/L (ref 9–39)
BACTERIA #/AREA URNS AUTO: ABNORMAL /HPF
BILIRUB SERPL-MCNC: 1.1 MG/DL (ref 0–1.2)
BUN SERPL-MCNC: 14 MG/DL (ref 6–23)
CALCIUM SERPL-MCNC: 10.5 MG/DL (ref 8.6–10.3)
CHLORIDE SERPL-SCNC: 100 MMOL/L (ref 98–107)
CO2 SERPL-SCNC: 28 MMOL/L (ref 21–32)
CREAT SERPL-MCNC: 0.97 MG/DL (ref 0.5–1.05)
EGFRCR SERPLBLD CKD-EPI 2021: 61 ML/MIN/1.73M*2
EST. AVERAGE GLUCOSE BLD GHB EST-MCNC: 160 MG/DL
GLUCOSE SERPL-MCNC: 120 MG/DL (ref 74–99)
HBA1C MFR BLD: 7.2 %
MUCOUS THREADS #/AREA URNS AUTO: ABNORMAL /LPF
POTASSIUM SERPL-SCNC: 4.2 MMOL/L (ref 3.5–5.3)
PROT SERPL-MCNC: 7.8 G/DL (ref 6.4–8.2)
RBC #/AREA URNS AUTO: ABNORMAL /HPF
SODIUM SERPL-SCNC: 137 MMOL/L (ref 136–145)
SQUAMOUS #/AREA URNS AUTO: ABNORMAL /HPF
WBC #/AREA URNS AUTO: ABNORMAL /HPF

## 2024-02-07 PROCEDURE — 82043 UR ALBUMIN QUANTITATIVE: CPT

## 2024-02-07 PROCEDURE — 83036 HEMOGLOBIN GLYCOSYLATED A1C: CPT

## 2024-02-07 PROCEDURE — 36415 COLL VENOUS BLD VENIPUNCTURE: CPT

## 2024-02-07 PROCEDURE — 80053 COMPREHEN METABOLIC PANEL: CPT

## 2024-02-07 PROCEDURE — 81001 URINALYSIS AUTO W/SCOPE: CPT

## 2024-02-07 PROCEDURE — 82570 ASSAY OF URINE CREATININE: CPT

## 2024-02-07 PROCEDURE — 82306 VITAMIN D 25 HYDROXY: CPT

## 2024-02-08 LAB
CREAT UR-MCNC: 35.1 MG/DL (ref 20–320)
MICROALBUMIN UR-MCNC: 30.8 MG/L
MICROALBUMIN/CREAT UR: 87.7 UG/MG CREAT

## 2024-02-12 DIAGNOSIS — I48.91 ATRIAL FIBRILLATION WITH RVR (MULTI): ICD-10-CM

## 2024-02-12 RX ORDER — AMIODARONE HYDROCHLORIDE 200 MG/1
200 TABLET ORAL DAILY
Qty: 90 TABLET | Refills: 3 | Status: SHIPPED | OUTPATIENT
Start: 2024-02-12 | End: 2025-02-11

## 2024-02-13 ENCOUNTER — OFFICE VISIT (OUTPATIENT)
Dept: PRIMARY CARE | Facility: CLINIC | Age: 79
End: 2024-02-13
Payer: MEDICARE

## 2024-02-13 VITALS
DIASTOLIC BLOOD PRESSURE: 78 MMHG | WEIGHT: 198 LBS | HEIGHT: 62 IN | BODY MASS INDEX: 36.44 KG/M2 | HEART RATE: 64 BPM | SYSTOLIC BLOOD PRESSURE: 130 MMHG

## 2024-02-13 DIAGNOSIS — Z00.00 ROUTINE GENERAL MEDICAL EXAMINATION AT HEALTH CARE FACILITY: Primary | ICD-10-CM

## 2024-02-13 DIAGNOSIS — I48.19 PERSISTENT ATRIAL FIBRILLATION (MULTI): ICD-10-CM

## 2024-02-13 DIAGNOSIS — R79.89 LOW VITAMIN D LEVEL: ICD-10-CM

## 2024-02-13 DIAGNOSIS — I50.42 CHRONIC COMBINED SYSTOLIC AND DIASTOLIC HEART FAILURE (MULTI): ICD-10-CM

## 2024-02-13 DIAGNOSIS — E11.42 TYPE 2 DIABETES MELLITUS WITH DIABETIC POLYNEUROPATHY, WITHOUT LONG-TERM CURRENT USE OF INSULIN (MULTI): ICD-10-CM

## 2024-02-13 PROBLEM — R92.8 ABNORMAL MAMMOGRAM: Status: RESOLVED | Noted: 2023-06-08 | Resolved: 2024-02-13

## 2024-02-13 PROBLEM — M79.673 PAIN OF FOOT: Status: RESOLVED | Noted: 2024-01-10 | Resolved: 2024-02-13

## 2024-02-13 PROBLEM — R30.0 DYSURIA: Status: RESOLVED | Noted: 2023-07-28 | Resolved: 2024-02-13

## 2024-02-13 PROBLEM — G62.2: Status: RESOLVED | Noted: 2024-01-02 | Resolved: 2024-02-13

## 2024-02-13 PROCEDURE — 3075F SYST BP GE 130 - 139MM HG: CPT | Performed by: FAMILY MEDICINE

## 2024-02-13 PROCEDURE — 1159F MED LIST DOCD IN RCRD: CPT | Performed by: FAMILY MEDICINE

## 2024-02-13 PROCEDURE — 1126F AMNT PAIN NOTED NONE PRSNT: CPT | Performed by: FAMILY MEDICINE

## 2024-02-13 PROCEDURE — 3078F DIAST BP <80 MM HG: CPT | Performed by: FAMILY MEDICINE

## 2024-02-13 PROCEDURE — 1170F FXNL STATUS ASSESSED: CPT | Performed by: FAMILY MEDICINE

## 2024-02-13 PROCEDURE — 1160F RVW MEDS BY RX/DR IN RCRD: CPT | Performed by: FAMILY MEDICINE

## 2024-02-13 PROCEDURE — 1036F TOBACCO NON-USER: CPT | Performed by: FAMILY MEDICINE

## 2024-02-13 PROCEDURE — 99214 OFFICE O/P EST MOD 30 MIN: CPT | Performed by: FAMILY MEDICINE

## 2024-02-13 ASSESSMENT — ACTIVITIES OF DAILY LIVING (ADL)
DRESSING: INDEPENDENT
DOING_HOUSEWORK: INDEPENDENT
MANAGING_FINANCES: INDEPENDENT
BATHING: INDEPENDENT
GROCERY_SHOPPING: INDEPENDENT
TAKING_MEDICATION: INDEPENDENT

## 2024-02-13 ASSESSMENT — PATIENT HEALTH QUESTIONNAIRE - PHQ9
2. FEELING DOWN, DEPRESSED OR HOPELESS: NOT AT ALL
1. LITTLE INTEREST OR PLEASURE IN DOING THINGS: NOT AT ALL
SUM OF ALL RESPONSES TO PHQ9 QUESTIONS 1 AND 2: 0

## 2024-02-13 NOTE — PROGRESS NOTES
"Subjective   Reason for Visit: Graciela Norman is an 78 y.o. female here for a Medicare Wellness visit.     Past Medical, Surgical, and Family History reviewed and updated in chart.    Reviewed all medications by prescribing practitioner or clinical pharmacist (such as prescriptions, OTCs, herbal therapies and supplements) and documented in the medical record.    HPI  Was hospitalized after last visit, a fib with rvr , chf, and non compliance with medications.   Feeling much better on current regimen. Shortness of breath is much better. Had cardioversion  DM, A1c up to 7.2, on jardiance now through cardiology  Reviewed labs, mild elevation of calcium, will recheck with next draw  On pacerone now, will get tsh with next draw as well  Htn, hyperlipidemia , stable    Patient Care Team:  Becky Gusman MD as PCP - General (Family Medicine)  Becky Gusman MD as PCP - [facility] Attributed Provider     Review of Systems   All other systems reviewed and are negative.      Objective   Vitals:  /78 (BP Location: Left arm, Patient Position: Sitting)   Pulse 64   Ht 1.575 m (5' 2\")   Wt 89.8 kg (198 lb)   BMI 36.21 kg/m²       Physical Exam  Vitals and nursing note reviewed.   Constitutional:       General: She is not in acute distress.     Appearance: Normal appearance. She is not toxic-appearing.   HENT:      Head: Normocephalic and atraumatic.   Cardiovascular:      Rate and Rhythm: Normal rate and regular rhythm.      Heart sounds: No murmur heard.  Pulmonary:      Effort: Pulmonary effort is normal.      Breath sounds: Normal breath sounds.   Musculoskeletal:      Cervical back: Neck supple. No rigidity.      Comments:     Skin:     General: Skin is warm and dry.   Neurological:      General: No focal deficit present.      Mental Status: She is alert and oriented to person, place, and time.   Psychiatric:         Mood and Affect: Mood normal.         Behavior: Behavior normal.         Assessment/Plan   Problem " List Items Addressed This Visit       Type 2 diabetes mellitus (CMS/[facility])    Relevant Orders    Comprehensive Metabolic Panel    TSH with reflex to Free T4 if abnormal    Hemoglobin A1C    CBC and Auto Differential    Calcium, ionized    Persistent atrial fibrillation (CMS/[facility])    Low vitamin D level    Chronic combined systolic and diastolic heart failure (CMS/[facility])     Other Visit Diagnoses       Routine general medical examination at health care facility    -  Primary

## 2024-02-22 ENCOUNTER — APPOINTMENT (OUTPATIENT)
Dept: PRIMARY CARE | Facility: CLINIC | Age: 79
End: 2024-02-22
Payer: MEDICARE

## 2024-02-24 DIAGNOSIS — I48.91 ATRIAL FIBRILLATION WITH RVR (MULTI): ICD-10-CM

## 2024-02-26 RX ORDER — LOSARTAN POTASSIUM 50 MG/1
25 TABLET ORAL DAILY
Qty: 15 TABLET | Refills: 11 | Status: SHIPPED | OUTPATIENT
Start: 2024-02-26 | End: 2025-02-25

## 2024-02-28 PROBLEM — Z88.8 ALLERGY TO STATIN MEDICATION: Status: ACTIVE | Noted: 2024-02-28

## 2024-03-05 ENCOUNTER — TELEPHONE (OUTPATIENT)
Dept: CARDIOLOGY | Facility: CLINIC | Age: 79
End: 2024-03-05
Payer: MEDICARE

## 2024-03-05 DIAGNOSIS — I50.42 CHRONIC COMBINED SYSTOLIC AND DIASTOLIC HEART FAILURE (MULTI): Primary | ICD-10-CM

## 2024-03-05 NOTE — TELEPHONE ENCOUNTER
Pt stopped by the office stating that she went to go  her Jardiance and it was over $200. Pt states she cannot spend that much. She wanted to know if she really needs that medication.

## 2024-03-15 ENCOUNTER — TELEMEDICINE (OUTPATIENT)
Dept: PHARMACY | Facility: HOSPITAL | Age: 79
End: 2024-03-15
Payer: MEDICARE

## 2024-03-15 DIAGNOSIS — I50.42 CHRONIC COMBINED SYSTOLIC AND DIASTOLIC HEART FAILURE (MULTI): ICD-10-CM

## 2024-03-15 DIAGNOSIS — I48.19 PERSISTENT ATRIAL FIBRILLATION (MULTI): Primary | ICD-10-CM

## 2024-03-15 PROCEDURE — RXMED WILLOW AMBULATORY MEDICATION CHARGE

## 2024-03-15 NOTE — PROGRESS NOTES
I reviewed the progress note and agree with the resident’s findings and plans as written. Case discussed with resident.    Sterling Canales, PharmD

## 2024-03-15 NOTE — PROGRESS NOTES
"Pharmacist Clinic: Heart Failure/Cardiomyopathy Management  Graciela Norman was referred to the Clinical Pharmacy Team for her heart failure/cardiomyopathy management.    Referring Provider:  Chandrika Sanchez    PHARMACY ASSESSMENT    Allergies Reviewed? No  Home Pharmacy Reviewed? No    Affordability/Accessibility: Jardiance is $300 a month  Adherence/Organization: Patient has not taken the Jardiance in about a week since it was so expensive when she was due for her refill.  Adverse Effects: None    CURRENT PHARMACOTHERAPY:   - Jardiance 10 mg daily  - Losartan 25 mg daily  - Spironolactone 25 mg daily  - Eliquis 5 mg BID    -Age: 78 years old   -Weight: 89.8 kg (198 lbs) as of 2/13/2024   -Scr: 0.97 mg/dL as of 2/7/2024  (patient had mentioned getting Eliquis through some type of program/study in [place], [place], would like to get it through [facility] with the Jardiance)    HISTORICAL PHARMACOTHERAPY:   - Patient has not been able to take Jardiance for a week since her supply ran out and it was too expensive    RELEVANT LAB RESULTS  Lab Results   Component Value Date    BILITOT 1.1 02/07/2024    CALCIUM 10.5 (H) 02/07/2024    CO2 28 02/07/2024     02/07/2024    CREATININE 0.97 02/07/2024    GLUCOSE 120 (H) 02/07/2024    ALKPHOS 77 02/07/2024    K 4.2 02/07/2024    PROT 7.8 02/07/2024     02/07/2024    AST 23 02/07/2024    ALT 28 02/07/2024    BUN 14 02/07/2024    ANIONGAP 13 02/07/2024    MG 1.81 01/02/2024    PHOS 3.3 01/02/2024    ALBUMIN 4.3 02/07/2024    GFRF 67 08/11/2023     Lab Results   Component Value Date    TRIG 136 08/11/2023    CHOL 225 (H) 08/11/2023    HDL 69.0 08/11/2023     No results found for: \"BMCBC\", \"CBCDIF\"       DRUG INTERACTIONS  - No    CHF ASSESSMENT     Symptom/Staging:  -Most recent ejection fraction: 35%    Guideline-Directed Medical Therapy:  -ARNI: No   -If no, then ACEi/ARB?: Yes, describe: Losartan 25 mg daily  -Beta Blocker: Yes, describe: Metoprolol tartrate 25 mg BID  -MRA: " Yes, describe: Spironolactone 25 mg daily  -SGLT2i: Yes, describe: Jardiance 10 mg daily    Secondary Prevention:  -The ASCVD Risk score (Irina DK, et al., 2019) failed to calculate for the following reasons:    The patient has a prior MI or stroke diagnosis   -Aspirin 81mg? no  -Statin?: No  -HTN?: Yes, describe: diltiazem 60 mg BID and losartan 25 mg daily     Patient Assistance Program (PAP)    Patient verbally reports monthly or yearly income which is less than 400% federal poverty level    Application for program to be submitted for the following medications: Jardiance, Eliquis    Prescription Insurance: Yes  Members of Household: 2  Files Taxes: Yes    Patient will be faxing financial information to pharmacist directly at [phone].    Patient aware this process may take up to 6 weeks.     If approved medication must be filled through [facility] pharmacy and mailed to patient.         PATIENT EDUCATION/GOALS  - Counseled patient on MOA, expectations, duration of therapy, contraindications, administration, and monitoring parameters  - Counseled patient on lifestyle modifications that can decrease your risk of having complications (smoking cessation, losing weight, daily weights, vaccines)  - Counseled patient on fluid intake and weight management. Recommended to not consume more than 2 liters of fliuids per day. If they have gained more than 2-3 pounds within a 24 hours period (or 5 pounds in a week), contact their cardiologist  - Answered all patient questions and concerns    RECOMMENDATIONS/PLAN  1. Start Jardiance 10 mg daily again once it is delivered from  home delivery pharmacy  2.  PAP application sent 3/20    Next Cardiology Appointment: 3/26/2024  Clinical Pharmacist follow up: 6/14/2024  Type of Encounter: Isai Kaur, PharmD    Verbal consent to manage patient's drug therapy was obtained from the patient . They were informed they may decline to participate or withdraw from  participation in pharmacy services at any time.    Continue all meds under the continuation of care with the referring provider and clinical pharmacy team.

## 2024-03-19 ENCOUNTER — PHARMACY VISIT (OUTPATIENT)
Dept: PHARMACY | Facility: CLINIC | Age: 79
End: 2024-03-19
Payer: COMMERCIAL

## 2024-03-25 ENCOUNTER — TELEPHONE (OUTPATIENT)
Dept: PHARMACY | Facility: HOSPITAL | Age: 79
End: 2024-03-25
Payer: MEDICARE

## 2024-03-25 PROCEDURE — RXMED WILLOW AMBULATORY MEDICATION CHARGE

## 2024-03-25 NOTE — TELEPHONE ENCOUNTER
Patient Assistance Program Approval:     We are pleased to inform you that your application for assistance has been approved.     This approval is valid through  3/25/2025  as long as the following criteria continue to be satisfied:     Your medication (Jardiance, Eliquis) remains covered under your current insurance plan.   Your prescriber does not discontinue therapy.   You do not seek reimbursement from any other private or government-funded programs for the  medication.    Under this program, the pharmacy will first bill your insurance plan for your indemnified specified medication. The [employer] Assistance Fund will then offset your copay balance, so that your out-of pocket expense for your specialty medication will be $0.00.    Loren Jaramillo, LizzieD

## 2024-03-26 ENCOUNTER — OFFICE VISIT (OUTPATIENT)
Dept: CARDIOLOGY | Facility: CLINIC | Age: 79
End: 2024-03-26
Payer: MEDICARE

## 2024-03-26 VITALS
DIASTOLIC BLOOD PRESSURE: 80 MMHG | BODY MASS INDEX: 37.54 KG/M2 | HEART RATE: 87 BPM | SYSTOLIC BLOOD PRESSURE: 128 MMHG | WEIGHT: 204 LBS | HEIGHT: 62 IN | OXYGEN SATURATION: 95 %

## 2024-03-26 DIAGNOSIS — I48.91 ATRIAL FIBRILLATION WITH RVR (MULTI): ICD-10-CM

## 2024-03-26 PROCEDURE — 3074F SYST BP LT 130 MM HG: CPT | Performed by: STUDENT IN AN ORGANIZED HEALTH CARE EDUCATION/TRAINING PROGRAM

## 2024-03-26 PROCEDURE — 3079F DIAST BP 80-89 MM HG: CPT | Performed by: STUDENT IN AN ORGANIZED HEALTH CARE EDUCATION/TRAINING PROGRAM

## 2024-03-26 PROCEDURE — 99214 OFFICE O/P EST MOD 30 MIN: CPT | Performed by: STUDENT IN AN ORGANIZED HEALTH CARE EDUCATION/TRAINING PROGRAM

## 2024-03-26 PROCEDURE — 1160F RVW MEDS BY RX/DR IN RCRD: CPT | Performed by: STUDENT IN AN ORGANIZED HEALTH CARE EDUCATION/TRAINING PROGRAM

## 2024-03-26 PROCEDURE — 1036F TOBACCO NON-USER: CPT | Performed by: STUDENT IN AN ORGANIZED HEALTH CARE EDUCATION/TRAINING PROGRAM

## 2024-03-26 PROCEDURE — 93000 ELECTROCARDIOGRAM COMPLETE: CPT | Performed by: STUDENT IN AN ORGANIZED HEALTH CARE EDUCATION/TRAINING PROGRAM

## 2024-03-26 PROCEDURE — 1159F MED LIST DOCD IN RCRD: CPT | Performed by: STUDENT IN AN ORGANIZED HEALTH CARE EDUCATION/TRAINING PROGRAM

## 2024-03-26 RX ORDER — DILTIAZEM HYDROCHLORIDE 60 MG/1
60 TABLET, FILM COATED ORAL 2 TIMES DAILY
Qty: 180 TABLET | Refills: 3 | Status: SHIPPED | OUTPATIENT
Start: 2024-03-26 | End: 2025-03-26

## 2024-03-26 NOTE — PROGRESS NOTES
"    Cardiac Electrophysiology Office Visit     Referred by Dr. Lawler ref. provider found for   Chief Complaint   Patient presents with    8 week follow up     Atrial Fibrillation     HPI:  Graciela Norman is a 78 y.o. year old female patient with h/o HTN, HLD, DM (not on insulin), Obesity, Atrial fibrillation presenting today for follow up    Objective  Current Outpatient Medications   Medication Instructions    acetaminophen (TYLENOL) 1,000 mg, oral, Nightly    amiodarone (PACERONE) 200 mg, oral, Daily    apixaban (ELIQUIS) 5 mg, oral, 2 times daily    cholecalciferol (VITAMIN D-3) 1,000 Units, oral, Nightly    cyanocobalamin (Vitamin B-12) 1,000 mcg tablet 1 tablet, oral, Daily    dilTIAZem (CARDIZEM) 60 mg, oral, 2 times daily    empagliflozin (JARDIANCE) 10 mg, oral, Daily    losartan (COZAAR) 25 mg, oral, Daily    metFORMIN (GLUCOPHAGE) 500 mg, oral, Daily    metoprolol tartrate (LOPRESSOR) 25 mg, oral, 2 times daily    spironolactone (ALDACTONE) 25 mg, oral, Daily         Visit Vitals  /80   Pulse 87   Ht 1.575 m (5' 2\")   Wt 92.5 kg (204 lb)   SpO2 95%   BMI 37.31 kg/m²   OB Status Postmenopausal   Smoking Status Former   BSA 2.01 m²      Physical Exam  Constitutional:       Appearance: Normal appearance.   HENT:      Head: Normocephalic.   Cardiovascular:      Rate and Rhythm: Normal rate. Rhythm irregular. Frequent Extrasystoles are present.     Pulses: Normal pulses.   Pulmonary:      Effort: No respiratory distress.      Breath sounds: No wheezing.   Skin:     General: Skin is warm and dry.      Capillary Refill: Capillary refill takes less than 2 seconds.   Neurological:      Mental Status: She is alert.   Psychiatric:         Mood and Affect: Mood normal.      My Interpretation of Reviewed Study(s):  Echo (Jan 2024): Reduced LV function with an EF of 35% with global hypokinesis.  Severely dilated left atrium moderate right atrium.  Moderate MR.  No pericardial effusion noted.  Nuclear stress test " (January 2024): No definite evidence of ischemia or prior infarct.  [id number] Score  Age >= 75: 2   Sex Female: 1   CHF History Yes: 1   HTN Yes: 1   Stroke/TIA/Thromboembolism No: 0   Vascular Dz: CAD/PAD/Aortic Plaque No: 0   DM Yes: 1   Total Score 6     Assessment/Plan   #Persistent atrial fibrillation  AF Dx History: Seen on ECG since 2021; h/o Cardioversion: No  AAD Use: Amiodarone 200mg (current); Anticoagulation use: Apixaban 5mg BID (current); h/o Ablation: None; [id number] Score: 6.  Patient underwent cardioversion on February 2024.  c/w AC: Apixaban 5mg BID  C/w Diltiazem 60mg BID  c/w Metoprolol tart 25mg BID  c/w Amiodarone 200mg Daily  In long term will consider RFA to allow for rhythm control but allow pt to come of Amiodarone    #HFrEF - Etiology unknown   Likely related to tachycardia from atrial fibrillation but given patient's comorbidities (diabetes, pulmonary CAD, age) will likely need to rule out underlying CAD.  If cardiac function recovers when patient is in sinus rhythm better rate controlled then no need for primary prevention ICD however if EF remains low despite goal-directed medical therapy and rhythm control then may need to consider prevention ICD.  Nuclear stress test did not suggest any underlying ischemic disease.  Likely reevaluate echo in 2 to 3 months after patient has been on rhythm control and has been goal-directed medical therapy  Refer to cardiology/heart failure Dr. Brock for further evaluation and management of her chronic systolic and diastolic heart failure    Return to Clinic: Patient should return to the EP Clinic in 3 months    Nilson Woods MD [facility]  Cardiac Electrophysiology  [email or web address]    **Disclaimer: This note was dictated by speech recognition, and every effort has been made to prevent any error in transcription, however minor errors may be present**

## 2024-03-27 ENCOUNTER — PHARMACY VISIT (OUTPATIENT)
Dept: PHARMACY | Facility: CLINIC | Age: 79
End: 2024-03-27
Payer: COMMERCIAL

## 2024-04-01 DIAGNOSIS — I48.0 PAROXYSMAL ATRIAL FIBRILLATION (MULTI): ICD-10-CM

## 2024-04-01 RX ORDER — METOPROLOL TARTRATE 25 MG/1
25 TABLET, FILM COATED ORAL 2 TIMES DAILY
Qty: 180 TABLET | Refills: 1 | Status: SHIPPED | OUTPATIENT
Start: 2024-04-01

## 2024-04-24 NOTE — PROGRESS NOTES
Subjective   Graciela Norman is a 79 y.o. female with a medical history of paroxysmal atrial fibrillation on Eliquis who presented with atrial fibrillation and rapid ventricular rates to the hospital January 5, 2024.  Echocardiogram at that time showed moderately reduced ejection fraction of 35% with a globally hypokinetic left ventricle.  Moderate mitral regurgitation was also noted.  Event monitor at that time showed predominant atrial fibrillation.  She underwent direct-current cardioversion February 1, 2024.  Cardiac issues currently include:    ACC/AHA stage C HFrEF  -Echocardiogram with an EF of 35% while the patient was in atrial fibrillation 1/3/2024  -Current GDMT includes Jardiance 10 mg daily, losartan 50 mg daily, Aldactone 25 mg daily.  Notably patient is also on Lopressor 25 mg twice daily, diltiazem 60 mg twice daily and amiodarone 200 mg daily.    Paroxysmal atrial fibrillation status post DCCV 2/24  -Currently in sinus rhythm.    Patient denies any chest discomfort.  Does have shortness of breath with moderate exertion that has largely been stable.  Denies any orthopnea/PND.  No bleeding complications from Eliquis    Review of Systems  A comprehensive review of systems was negative.     Past Medical History:   Diagnosis Date    Atrial fibrillation (Multi)     Deficiency of other specified B group vitamins 05/24/2021    Low vitamin B12 level    Diabetes (Multi)     FARTUN on CPAP     Personal history of other malignant neoplasm of large intestine 08/15/2022    History of malignant neoplasm of colon       Past Surgical History:   Procedure Laterality Date    OTHER SURGICAL HISTORY  01/27/2020    Colonoscopy    OTHER SURGICAL HISTORY  01/27/2020    Eye surgery    OTHER SURGICAL HISTORY  01/27/2020    Hernia repair    OTHER SURGICAL HISTORY  01/27/2020    Skin lesion excision    OTHER SURGICAL HISTORY  01/27/2020    Colectomy    OTHER SURGICAL HISTORY  01/27/2020    Salpingo-oophorectomy bilateral     "OTHER SURGICAL HISTORY  01/27/2020    Ganglion cyst excision    OTHER SURGICAL HISTORY  01/27/2020    Cystoscopy    OTHER SURGICAL HISTORY  01/27/2020    Cataract surgery    OTHER SURGICAL HISTORY  05/26/2020    Breast biopsy       Allergies   Allergen Reactions    Aspirin Diarrhea    Diphenhydramine Other     Heart races, jittery    Iodinated Contrast Media Unknown    Penicillins Hives    Shellfish Derived Hives    Statins-Hmg-Coa Reductase Inhibitors Myalgia     Muscle aches    Sulfa (Sulfonamide Antibiotics) Rash       Family History   Problem Relation Name Age of Onset    Stroke Other paternal uncle        Objective   Visit Vitals  /72   Pulse 65   Ht 1.575 m (5' 2\")   Wt 94.3 kg (208 lb)   SpO2 96%   BMI 38.04 kg/m²   OB Status Postmenopausal   Smoking Status Former   BSA 2.03 m²     General: awake, alert and oriented. No acute distress.   Skin: Skin is warm, dry and intact without rashes or lesions. Appropriate color for ethnicity. Nail beds pink with no cyanosis or clubbing  HEENT: normocephalic, atraumatic; conjunctivae are clear without exudates or hemorrhage. Sclera is non-icteric. Eyelids are normal in appearance without swelling or lesions. Hearing intact. Nares are patent bilaterally. Moist mucous membranes.   Cardiovascular: Regular. No murmurs, gallops, or rubs are auscultated. S1 and S2 are heard and are of normal intensity. No JVD, no carotid bruits  Respiratory: Thorax symmetric. CTAB, breath sounds vesicular. No crackles, wheezes or ronchi.   Gastrointestinal: soft, non-distended, BS + x 4  Genitourinary: exam deferred  Musculoskeletal: moves all extremities  Extremities: pulses palpable bilaterally; no swelling or erythema; no edema  Neurological: alert & oriented x 3; no focal deficits  Psychiatric: appropriate mood and affect        Lab Review   Lab Results   Component Value Date    HGB 14.5 01/05/2024    HGB 13.7 01/04/2024    HGB 13.5 01/03/2024     01/05/2024    WBC 7.1 " "01/05/2024     02/07/2024    K 4.2 02/07/2024    CREATININE 0.97 02/07/2024    CREATININE 0.85 01/05/2024    CREATININE 0.77 01/04/2024    BUN 14 02/07/2024    CALCIUM 10.5 (H) 02/07/2024     (H) 01/02/2024    TROPHS 33 (H) 01/02/2024    LDLF 129 (H) 08/11/2023        Assessment/Plan   78 yo Female with a medical history of paroxysmal atrial fibrillation on Eliquis who presented with atrial fibrillation and rapid ventricular rates to the hospital January 5, 2024.  Echocardiogram at that time showed moderately reduced ejection fraction of 35% with a globally hypokinetic left ventricle.  Moderate mitral regurgitation was also noted.  Event monitor at that time showed predominant atrial fibrillation.  She underwent direct-current cardioversion February 1, 2024.  Cardiac issues currently include:    ACC/AHA stage C HFrEF  -Echocardiogram with an EF of 35% while the patient was in atrial fibrillation 1/3/2024  -Current GDMT includes Jardiance 10 mg daily, losartan 50 mg daily, Aldactone 25 mg daily.  Notably patient is also on Lopressor 25 mg twice daily, diltiazem 60 mg twice daily and amiodarone 200 mg daily.    Paroxysmal atrial fibrillation status post DCCV 2/24  -Currently in sinus rhythm.    Plan:  -Appears \"warm and dry\" on exam.  For her HFrEF we will repeat an echocardiogram at this time to reassess ejection fraction-if noted to be persistently low will consider further up titration of medical therapy with the addition of Entresto/substitution of beta-blocker to enrollment in the heart failure clinic  -For A-fib in sinus rhythm at this time.  Continue Eliquis/amiodarone  -RTC 3 months     Markos Brock MD  Advanced Heart Failure/Transplant Cardiology  Cardio-Oncology  [facility] [place]   "

## 2024-04-25 ENCOUNTER — OFFICE VISIT (OUTPATIENT)
Dept: CARDIOLOGY | Facility: CLINIC | Age: 79
End: 2024-04-25
Payer: MEDICARE

## 2024-04-25 VITALS
OXYGEN SATURATION: 96 % | SYSTOLIC BLOOD PRESSURE: 120 MMHG | HEART RATE: 65 BPM | WEIGHT: 208 LBS | HEIGHT: 62 IN | BODY MASS INDEX: 38.28 KG/M2 | DIASTOLIC BLOOD PRESSURE: 72 MMHG

## 2024-04-25 DIAGNOSIS — Z76.89 ENCOUNTER FOR ASSESSMENT OF ATRIAL FIBRILLATION: ICD-10-CM

## 2024-04-25 DIAGNOSIS — I50.22 CHRONIC SYSTOLIC HEART FAILURE (MULTI): Primary | ICD-10-CM

## 2024-04-25 PROCEDURE — 99204 OFFICE O/P NEW MOD 45 MIN: CPT | Performed by: STUDENT IN AN ORGANIZED HEALTH CARE EDUCATION/TRAINING PROGRAM

## 2024-04-25 PROCEDURE — 1036F TOBACCO NON-USER: CPT | Performed by: STUDENT IN AN ORGANIZED HEALTH CARE EDUCATION/TRAINING PROGRAM

## 2024-04-25 PROCEDURE — 1159F MED LIST DOCD IN RCRD: CPT | Performed by: STUDENT IN AN ORGANIZED HEALTH CARE EDUCATION/TRAINING PROGRAM

## 2024-04-25 PROCEDURE — 1160F RVW MEDS BY RX/DR IN RCRD: CPT | Performed by: STUDENT IN AN ORGANIZED HEALTH CARE EDUCATION/TRAINING PROGRAM

## 2024-04-25 PROCEDURE — 3074F SYST BP LT 130 MM HG: CPT | Performed by: STUDENT IN AN ORGANIZED HEALTH CARE EDUCATION/TRAINING PROGRAM

## 2024-04-25 PROCEDURE — 3078F DIAST BP <80 MM HG: CPT | Performed by: STUDENT IN AN ORGANIZED HEALTH CARE EDUCATION/TRAINING PROGRAM

## 2024-05-01 ENCOUNTER — HOSPITAL ENCOUNTER (OUTPATIENT)
Dept: CARDIOLOGY | Facility: HOSPITAL | Age: 79
Discharge: HOME | End: 2024-05-01
Payer: MEDICARE

## 2024-05-01 DIAGNOSIS — Z76.89 ENCOUNTER FOR ASSESSMENT OF ATRIAL FIBRILLATION: ICD-10-CM

## 2024-05-01 DIAGNOSIS — I50.20 UNSPECIFIED SYSTOLIC (CONGESTIVE) HEART FAILURE (MULTI): ICD-10-CM

## 2024-05-01 DIAGNOSIS — I50.22 CHRONIC SYSTOLIC HEART FAILURE (MULTI): ICD-10-CM

## 2024-05-01 LAB
AORTIC VALVE MEAN GRADIENT: 6 MMHG
AORTIC VALVE PEAK VELOCITY: 1.72 M/S
AV PEAK GRADIENT: 11.8 MMHG
AVA (PEAK VEL): 1.88 CM2
AVA (VTI): 1.87 CM2
EJECTION FRACTION APICAL 4 CHAMBER: 56.8
LEFT ATRIUM VOLUME AREA LENGTH INDEX BSA: 15.3 ML/M2
LEFT VENTRICLE INTERNAL DIMENSION DIASTOLE: 3.74 CM (ref 3.5–6)
LEFT VENTRICULAR OUTFLOW TRACT DIAMETER: 1.8 CM
LV EJECTION FRACTION BIPLANE: 61 %
MITRAL VALVE E/A RATIO: 0.63
RIGHT VENTRICLE FREE WALL PEAK S': 17.4 CM/S

## 2024-05-01 PROCEDURE — 93306 TTE W/DOPPLER COMPLETE: CPT | Performed by: STUDENT IN AN ORGANIZED HEALTH CARE EDUCATION/TRAINING PROGRAM

## 2024-05-01 PROCEDURE — 93306 TTE W/DOPPLER COMPLETE: CPT

## 2024-05-03 ENCOUNTER — TELEPHONE (OUTPATIENT)
Dept: CARDIOLOGY | Facility: CLINIC | Age: 79
End: 2024-05-03
Payer: MEDICARE

## 2024-05-03 NOTE — TELEPHONE ENCOUNTER
PT NOTIFIED        ----- Message from Nery Perez CMA sent at 5/3/2024 11:47 AM EDT -----  Tried calling the pt and could not leave a message.   ----- Message -----  From: Markos Brock MD  Sent: 5/3/2024  10:52 AM EDT  To: Do [email or web address] Card1 Clinical Support Staff    Please let patient know that her ultrasound shows an ejection fraction that appears to have improved.  No action is required at this time.  Thanks

## 2024-05-08 ENCOUNTER — LAB (OUTPATIENT)
Dept: LAB | Facility: LAB | Age: 79
End: 2024-05-08
Payer: MEDICARE

## 2024-05-08 DIAGNOSIS — E11.42 TYPE 2 DIABETES MELLITUS WITH DIABETIC POLYNEUROPATHY, WITHOUT LONG-TERM CURRENT USE OF INSULIN (MULTI): ICD-10-CM

## 2024-05-08 LAB
ALBUMIN SERPL BCP-MCNC: 4.1 G/DL (ref 3.4–5)
ALP SERPL-CCNC: 69 U/L (ref 33–136)
ALT SERPL W P-5'-P-CCNC: 19 U/L (ref 7–45)
ANION GAP SERPL CALC-SCNC: 12 MMOL/L (ref 10–20)
AST SERPL W P-5'-P-CCNC: 17 U/L (ref 9–39)
BASOPHILS # BLD AUTO: 0.1 X10*3/UL (ref 0–0.1)
BASOPHILS NFR BLD AUTO: 1.4 %
BILIRUB SERPL-MCNC: 1.2 MG/DL (ref 0–1.2)
BUN SERPL-MCNC: 16 MG/DL (ref 6–23)
CALCIUM SERPL-MCNC: 9.6 MG/DL (ref 8.6–10.3)
CHLORIDE SERPL-SCNC: 101 MMOL/L (ref 98–107)
CO2 SERPL-SCNC: 27 MMOL/L (ref 21–32)
CREAT SERPL-MCNC: 0.95 MG/DL (ref 0.5–1.05)
EGFRCR SERPLBLD CKD-EPI 2021: 62 ML/MIN/1.73M*2
EOSINOPHIL # BLD AUTO: 0.28 X10*3/UL (ref 0–0.4)
EOSINOPHIL NFR BLD AUTO: 4 %
ERYTHROCYTE [DISTWIDTH] IN BLOOD BY AUTOMATED COUNT: 14.1 % (ref 11.5–14.5)
EST. AVERAGE GLUCOSE BLD GHB EST-MCNC: 143 MG/DL
GLUCOSE SERPL-MCNC: 116 MG/DL (ref 74–99)
HBA1C MFR BLD: 6.6 %
HCT VFR BLD AUTO: 49.7 % (ref 36–46)
HGB BLD-MCNC: 15.7 G/DL (ref 12–16)
IMM GRANULOCYTES # BLD AUTO: 0.06 X10*3/UL (ref 0–0.5)
IMM GRANULOCYTES NFR BLD AUTO: 0.9 % (ref 0–0.9)
LYMPHOCYTES # BLD AUTO: 2.68 X10*3/UL (ref 0.8–3)
LYMPHOCYTES NFR BLD AUTO: 38.2 %
MCH RBC QN AUTO: 32.2 PG (ref 26–34)
MCHC RBC AUTO-ENTMCNC: 31.6 G/DL (ref 32–36)
MCV RBC AUTO: 102 FL (ref 80–100)
MONOCYTES # BLD AUTO: 0.69 X10*3/UL (ref 0.05–0.8)
MONOCYTES NFR BLD AUTO: 9.8 %
NEUTROPHILS # BLD AUTO: 3.21 X10*3/UL (ref 1.6–5.5)
NEUTROPHILS NFR BLD AUTO: 45.7 %
NRBC BLD-RTO: 0 /100 WBCS (ref 0–0)
PLATELET # BLD AUTO: 276 X10*3/UL (ref 150–450)
POTASSIUM SERPL-SCNC: 4.5 MMOL/L (ref 3.5–5.3)
PROT SERPL-MCNC: 7.1 G/DL (ref 6.4–8.2)
RBC # BLD AUTO: 4.88 X10*6/UL (ref 4–5.2)
SODIUM SERPL-SCNC: 135 MMOL/L (ref 136–145)
TSH SERPL-ACNC: 3.2 MIU/L (ref 0.44–3.98)
WBC # BLD AUTO: 7 X10*3/UL (ref 4.4–11.3)

## 2024-05-08 PROCEDURE — 36415 COLL VENOUS BLD VENIPUNCTURE: CPT

## 2024-05-08 PROCEDURE — 82330 ASSAY OF CALCIUM: CPT

## 2024-05-08 PROCEDURE — 83036 HEMOGLOBIN GLYCOSYLATED A1C: CPT

## 2024-05-08 PROCEDURE — 84443 ASSAY THYROID STIM HORMONE: CPT

## 2024-05-08 PROCEDURE — 85025 COMPLETE CBC W/AUTO DIFF WBC: CPT

## 2024-05-08 PROCEDURE — 80053 COMPREHEN METABOLIC PANEL: CPT

## 2024-05-09 ENCOUNTER — PHARMACY VISIT (OUTPATIENT)
Dept: PHARMACY | Facility: CLINIC | Age: 79
End: 2024-05-09
Payer: COMMERCIAL

## 2024-05-09 LAB — CA-I BLD-SCNC: 1.28 MMOL/L (ref 1.1–1.33)

## 2024-05-09 PROCEDURE — RXMED WILLOW AMBULATORY MEDICATION CHARGE

## 2024-05-14 ENCOUNTER — OFFICE VISIT (OUTPATIENT)
Dept: PRIMARY CARE | Facility: CLINIC | Age: 79
End: 2024-05-14
Payer: MEDICARE

## 2024-05-14 VITALS
DIASTOLIC BLOOD PRESSURE: 70 MMHG | HEIGHT: 62 IN | WEIGHT: 208 LBS | SYSTOLIC BLOOD PRESSURE: 134 MMHG | BODY MASS INDEX: 38.28 KG/M2 | HEART RATE: 76 BPM

## 2024-05-14 DIAGNOSIS — E11.42 TYPE 2 DIABETES MELLITUS WITH DIABETIC POLYNEUROPATHY, WITHOUT LONG-TERM CURRENT USE OF INSULIN (MULTI): ICD-10-CM

## 2024-05-14 DIAGNOSIS — Z78.0 ASYMPTOMATIC MENOPAUSAL STATE: ICD-10-CM

## 2024-05-14 DIAGNOSIS — I48.19 PERSISTENT ATRIAL FIBRILLATION (MULTI): ICD-10-CM

## 2024-05-14 DIAGNOSIS — Z11.59 NEED FOR HEPATITIS C SCREENING TEST: ICD-10-CM

## 2024-05-14 DIAGNOSIS — Z12.31 ENCOUNTER FOR SCREENING MAMMOGRAM FOR BREAST CANCER: ICD-10-CM

## 2024-05-14 DIAGNOSIS — I10 BENIGN HYPERTENSION: ICD-10-CM

## 2024-05-14 DIAGNOSIS — Z00.00 ROUTINE GENERAL MEDICAL EXAMINATION AT HEALTH CARE FACILITY: Primary | ICD-10-CM

## 2024-05-14 PROCEDURE — 99214 OFFICE O/P EST MOD 30 MIN: CPT | Performed by: FAMILY MEDICINE

## 2024-05-14 PROCEDURE — 1124F ACP DISCUSS-NO DSCNMKR DOCD: CPT | Performed by: FAMILY MEDICINE

## 2024-05-14 PROCEDURE — 3078F DIAST BP <80 MM HG: CPT | Performed by: FAMILY MEDICINE

## 2024-05-14 PROCEDURE — G0439 PPPS, SUBSEQ VISIT: HCPCS | Performed by: FAMILY MEDICINE

## 2024-05-14 PROCEDURE — 1036F TOBACCO NON-USER: CPT | Performed by: FAMILY MEDICINE

## 2024-05-14 PROCEDURE — 3075F SYST BP GE 130 - 139MM HG: CPT | Performed by: FAMILY MEDICINE

## 2024-05-14 PROCEDURE — 1170F FXNL STATUS ASSESSED: CPT | Performed by: FAMILY MEDICINE

## 2024-05-14 PROCEDURE — 1160F RVW MEDS BY RX/DR IN RCRD: CPT | Performed by: FAMILY MEDICINE

## 2024-05-14 PROCEDURE — 1159F MED LIST DOCD IN RCRD: CPT | Performed by: FAMILY MEDICINE

## 2024-05-14 ASSESSMENT — ACTIVITIES OF DAILY LIVING (ADL)
DOING_HOUSEWORK: INDEPENDENT
DRESSING: INDEPENDENT
BATHING: INDEPENDENT
GROCERY_SHOPPING: INDEPENDENT
MANAGING_FINANCES: INDEPENDENT
TAKING_MEDICATION: INDEPENDENT

## 2024-05-14 ASSESSMENT — PATIENT HEALTH QUESTIONNAIRE - PHQ9
2. FEELING DOWN, DEPRESSED OR HOPELESS: NOT AT ALL
SUM OF ALL RESPONSES TO PHQ9 QUESTIONS 1 AND 2: 0
1. LITTLE INTEREST OR PLEASURE IN DOING THINGS: NOT AT ALL

## 2024-05-14 NOTE — PROGRESS NOTES
"Subjective   Reason for Visit: Graciela Norman is an 79 y.o. female here for a Medicare Wellness visit.     Past Medical, Surgical, and Family History reviewed and updated in chart.    Reviewed all medications by prescribing practitioner or clinical pharmacist (such as prescriptions, OTCs, herbal therapies and supplements) and documented in the medical record.    HPI  DM, HTN, afib on amiodarone, hyperlipidemia, currently stable  Denies problems or concerns, states feeling much better on current cardiac regimen.  Due for colonoscopy, reviewed with her, declines.     Patient Care Team:  Becky Gusman MD as PCP - General (Family Medicine)  [facility] Gus[facility]     Review of Systems   All other systems reviewed and are negative.      Objective   Vitals:  /70 (BP Location: Left arm, Patient Position: Sitting)   Pulse 76   Ht 1.575 m (5' 2\")   Wt 94.3 kg (208 lb)   BMI 38.04 kg/m²       Physical Exam  Vitals reviewed.   HENT:      Head: Normocephalic.   Neurological:      Mental Status: She is alert.        Current Outpatient Medications:     acetaminophen (Tylenol) 500 mg tablet, Take 2 tablets (1,000 mg) by mouth once daily at bedtime., Disp: , Rfl:     amiodarone (Pacerone) 200 mg tablet, Take 1 tablet (200 mg) by mouth once daily., Disp: 90 tablet, Rfl: 3    apixaban (Eliquis) 5 mg tablet, Take 1 tablet (5 mg) by mouth 2 times a day., Disp: 60 tablet, Rfl: 11    cholecalciferol (Vitamin D-3) 25 MCG (1000 UT) tablet, Take 1 tablet (1,000 Units) by mouth once daily at bedtime., Disp: , Rfl:     cyanocobalamin (Vitamin B-12) 1,000 mcg tablet, Take 1 tablet (1,000 mcg) by mouth once daily., Disp: , Rfl:     dilTIAZem (Cardizem) 60 mg immediate release tablet, Take 1 tablet (60 mg) by mouth 2 times a day., Disp: 180 tablet, Rfl: 3    empagliflozin (Jardiance) 10 mg, Take 1 tablet (10 mg) by mouth once daily., Disp: 90 tablet, Rfl: 3    losartan (Cozaar) 50 mg tablet, TAKE " 0.5 TABLETS (25 MG) BY MOUTH ONCE DAILY., Disp: 15 tablet, Rfl: 11    metFORMIN (Glucophage) 500 mg tablet, TAKE 1 TABLET BY MOUTH EVERY DAY, Disp: 90 tablet, Rfl: 1    metoprolol tartrate (Lopressor) 25 mg tablet, TAKE 1 TABLET BY MOUTH TWICE A DAY, Disp: 180 tablet, Rfl: 1    spironolactone (Aldactone) 25 mg tablet, Take 1 tablet (25 mg) by mouth once daily., Disp: 30 tablet, Rfl: 3     Lab on 05/08/2024   Component Date Value Ref Range Status    Glucose 05/08/2024 116 (H)  74 - 99 mg/dL Final    Sodium 05/08/2024 135 (L)  136 - 145 mmol/L Final    Potassium 05/08/2024 4.5  3.5 - 5.3 mmol/L Final    Chloride 05/08/2024 101  98 - 107 mmol/L Final    Bicarbonate 05/08/2024 27  21 - 32 mmol/L Final    Anion Gap 05/08/2024 12  10 - 20 mmol/L Final    Urea Nitrogen 05/08/2024 16  6 - 23 mg/dL Final    Creatinine 05/08/2024 0.95  0.50 - 1.05 mg/dL Final    eGFR 05/08/2024 62  >60 mL/min/1.73m*2 Final    Calculations of estimated GFR are performed using the 2021 CKD-EPI Study Refit equation without the race variable for the IDMS-Traceable creatinine methods.  https://jasn.asnjournals.org/content/early/2021/09/22/ASN.7522287664    Calcium 05/08/2024 9.6  8.6 - 10.3 mg/dL Final    Albumin 05/08/2024 4.1  3.4 - 5.0 g/dL Final    Alkaline Phosphatase 05/08/2024 69  33 - 136 U/L Final    Total Protein 05/08/2024 7.1  6.4 - 8.2 g/dL Final    AST 05/08/2024 17  9 - 39 U/L Final    Bilirubin, Total 05/08/2024 1.2  0.0 - 1.2 mg/dL Final    ALT 05/08/2024 19  7 - 45 U/L Final    Patients treated with Sulfasalazine may generate falsely decreased results for ALT.    Thyroid Stimulating Hormone 05/08/2024 3.20  0.44 - 3.98 mIU/L Final    Hemoglobin A1C 05/08/2024 6.6 (H)  see below % Final    Estimated Average Glucose 05/08/2024 143  Not Established mg/dL Final    WBC 05/08/2024 7.0  4.4 - 11.3 x10*3/uL Final    nRBC 05/08/2024 0.0  0.0 - 0.0 /100 WBCs Final    RBC 05/08/2024 4.88  4.00 - 5.20 x10*6/uL Final    Hemoglobin 05/08/2024  15.7  12.0 - 16.0 g/dL Final    Hematocrit 05/08/2024 49.7 (H)  36.0 - 46.0 % Final    MCV 05/08/2024 102 (H)  80 - 100 fL Final    MCH 05/08/2024 32.2  26.0 - 34.0 pg Final    MCHC 05/08/2024 31.6 (L)  32.0 - 36.0 g/dL Final    RDW 05/08/2024 14.1  11.5 - 14.5 % Final    Platelets 05/08/2024 276  150 - 450 x10*3/uL Final    Neutrophils % 05/08/2024 45.7  40.0 - 80.0 % Final    Immature Granulocytes %, Automated 05/08/2024 0.9  0.0 - 0.9 % Final    Immature Granulocyte Count (IG) includes promyelocytes, myelocytes and metamyelocytes but does not include bands. Percent differential counts (%) should be interpreted in the context of the absolute cell counts (cells/UL).    Lymphocytes % 05/08/2024 38.2  13.0 - 44.0 % Final    Monocytes % 05/08/2024 9.8  2.0 - 10.0 % Final    Eosinophils % 05/08/2024 4.0  0.0 - 6.0 % Final    Basophils % 05/08/2024 1.4  0.0 - 2.0 % Final    Neutrophils Absolute 05/08/2024 3.21  1.60 - 5.50 x10*3/uL Final    Percent differential counts (%) should be interpreted in the context of the absolute cell counts (cells/uL).    Immature Granulocytes Absolute, Au* 05/08/2024 0.06  0.00 - 0.50 x10*3/uL Final    Lymphocytes Absolute 05/08/2024 2.68  0.80 - 3.00 x10*3/uL Final    Monocytes Absolute 05/08/2024 0.69  0.05 - 0.80 x10*3/uL Final    Eosinophils Absolute 05/08/2024 0.28  0.00 - 0.40 x10*3/uL Final    Basophils Absolute 05/08/2024 0.10  0.00 - 0.10 x10*3/uL Final    POCT Calcium, Ionized 05/08/2024 1.28  1.1 - 1.33 mmol/L Final    The performance characteristics of ionized calcium tested  in heparinized plasma or serum have been validated by the  individual  laboratory site where testing is performed.   Testing on heparinized plasma or serum is not approved by   the FDA; however, such approval is not necessary.   Hospital Outpatient Visit on 05/01/2024   Component Date Value Ref Range Status    LVOT diam 05/01/2024 1.80  cm Final    MV E/A ratio 05/01/2024 0.63   Final    AV pk momo  05/01/2024 1.72  m/s Final    AV mn grad 05/01/2024 6.0  mmHg Final    LV Biplane EF 05/01/2024 61  % Final    LA vol index A/L 05/01/2024 15.3  ml/m2 Final    RV free wall pk S' 05/01/2024 17.40  cm/s Final    LVIDd 05/01/2024 3.74  cm Final    Aortic Valve Area by Continuity of* 05/01/2024 1.88  cm2 Final    Aortic Valve Area by Continuity of* 05/01/2024 1.87  cm2 Final    AV pk grad 05/01/2024 11.8  mmHg Final    LV A4C EF 05/01/2024 56.8   Final       Assessment/Plan   Problem List Items Addressed This Visit       Type 2 diabetes mellitus (Multi)    Persistent atrial fibrillation (Multi)    Benign hypertension     Other Visit Diagnoses       Routine general medical examination at health care facility    -  Primary    Asymptomatic menopausal state        Relevant Orders    XR DEXA bone density    Encounter for screening mammogram for breast cancer        Relevant Orders    BI mammo bilateral screening tomosynthesis    Need for hepatitis C screening test        Relevant Orders    Hepatitis C antibody          Follow up six months, labs prior.  Call concerns.

## 2024-05-29 DIAGNOSIS — I48.91 ATRIAL FIBRILLATION WITH RVR (MULTI): ICD-10-CM

## 2024-05-29 RX ORDER — SPIRONOLACTONE 25 MG/1
25 TABLET ORAL DAILY
Qty: 90 TABLET | Refills: 0 | Status: SHIPPED | OUTPATIENT
Start: 2024-05-29

## 2024-06-01 PROCEDURE — RXMED WILLOW AMBULATORY MEDICATION CHARGE

## 2024-06-04 ENCOUNTER — PHARMACY VISIT (OUTPATIENT)
Dept: PHARMACY | Facility: CLINIC | Age: 79
End: 2024-06-04
Payer: COMMERCIAL

## 2024-06-13 ENCOUNTER — LAB (OUTPATIENT)
Dept: LAB | Facility: LAB | Age: 79
End: 2024-06-13
Payer: MEDICARE

## 2024-06-13 DIAGNOSIS — C18.2 MALIGNANT NEOPLASM OF ASCENDING COLON (MULTI): Primary | ICD-10-CM

## 2024-06-13 LAB
ALBUMIN SERPL BCP-MCNC: 4.3 G/DL (ref 3.4–5)
ALP SERPL-CCNC: 81 U/L (ref 33–136)
ALT SERPL W P-5'-P-CCNC: 20 U/L (ref 7–45)
ANION GAP SERPL CALC-SCNC: 13 MMOL/L (ref 10–20)
AST SERPL W P-5'-P-CCNC: 18 U/L (ref 9–39)
BASOPHILS # BLD AUTO: 0.09 X10*3/UL (ref 0–0.1)
BASOPHILS NFR BLD AUTO: 1.3 %
BILIRUB SERPL-MCNC: 0.8 MG/DL (ref 0–1.2)
BUN SERPL-MCNC: 23 MG/DL (ref 6–23)
CALCIUM SERPL-MCNC: 9.7 MG/DL (ref 8.6–10.3)
CHLORIDE SERPL-SCNC: 101 MMOL/L (ref 98–107)
CO2 SERPL-SCNC: 24 MMOL/L (ref 21–32)
CREAT SERPL-MCNC: 1.11 MG/DL (ref 0.5–1.05)
EGFRCR SERPLBLD CKD-EPI 2021: 51 ML/MIN/1.73M*2
EOSINOPHIL # BLD AUTO: 0.29 X10*3/UL (ref 0–0.4)
EOSINOPHIL NFR BLD AUTO: 4.1 %
ERYTHROCYTE [DISTWIDTH] IN BLOOD BY AUTOMATED COUNT: 13.1 % (ref 11.5–14.5)
GLUCOSE SERPL-MCNC: 120 MG/DL (ref 74–99)
HCT VFR BLD AUTO: 51.7 % (ref 36–46)
HGB BLD-MCNC: 16.3 G/DL (ref 12–16)
IMM GRANULOCYTES # BLD AUTO: 0.04 X10*3/UL (ref 0–0.5)
IMM GRANULOCYTES NFR BLD AUTO: 0.6 % (ref 0–0.9)
LYMPHOCYTES # BLD AUTO: 2.74 X10*3/UL (ref 0.8–3)
LYMPHOCYTES NFR BLD AUTO: 38.5 %
MCH RBC QN AUTO: 32.6 PG (ref 26–34)
MCHC RBC AUTO-ENTMCNC: 31.5 G/DL (ref 32–36)
MCV RBC AUTO: 103 FL (ref 80–100)
MONOCYTES # BLD AUTO: 0.56 X10*3/UL (ref 0.05–0.8)
MONOCYTES NFR BLD AUTO: 7.9 %
NEUTROPHILS # BLD AUTO: 3.39 X10*3/UL (ref 1.6–5.5)
NEUTROPHILS NFR BLD AUTO: 47.6 %
NRBC BLD-RTO: 0 /100 WBCS (ref 0–0)
PLATELET # BLD AUTO: 268 X10*3/UL (ref 150–450)
POTASSIUM SERPL-SCNC: 4.4 MMOL/L (ref 3.5–5.3)
PROT SERPL-MCNC: 7.9 G/DL (ref 6.4–8.2)
RBC # BLD AUTO: 5 X10*6/UL (ref 4–5.2)
SODIUM SERPL-SCNC: 134 MMOL/L (ref 136–145)
WBC # BLD AUTO: 7.1 X10*3/UL (ref 4.4–11.3)

## 2024-06-13 PROCEDURE — 85025 COMPLETE CBC W/AUTO DIFF WBC: CPT

## 2024-06-13 PROCEDURE — 80053 COMPREHEN METABOLIC PANEL: CPT

## 2024-06-13 PROCEDURE — 36415 COLL VENOUS BLD VENIPUNCTURE: CPT

## 2024-06-13 PROCEDURE — 82378 CARCINOEMBRYONIC ANTIGEN: CPT

## 2024-06-14 ENCOUNTER — HOSPITAL ENCOUNTER (OUTPATIENT)
Dept: RADIOLOGY | Facility: CLINIC | Age: 79
Discharge: HOME | End: 2024-06-14
Payer: MEDICARE

## 2024-06-14 ENCOUNTER — APPOINTMENT (OUTPATIENT)
Dept: PHARMACY | Facility: HOSPITAL | Age: 79
End: 2024-06-14
Payer: MEDICARE

## 2024-06-14 VITALS — HEIGHT: 62 IN | BODY MASS INDEX: 38.27 KG/M2 | WEIGHT: 207.98 LBS

## 2024-06-14 DIAGNOSIS — I50.42 CHRONIC COMBINED SYSTOLIC AND DIASTOLIC HEART FAILURE (MULTI): ICD-10-CM

## 2024-06-14 DIAGNOSIS — Z78.0 ASYMPTOMATIC MENOPAUSAL STATE: ICD-10-CM

## 2024-06-14 DIAGNOSIS — Z12.31 ENCOUNTER FOR SCREENING MAMMOGRAM FOR BREAST CANCER: ICD-10-CM

## 2024-06-14 LAB — CEA SERPL-MCNC: 2.5 UG/L

## 2024-06-14 PROCEDURE — 77080 DXA BONE DENSITY AXIAL: CPT

## 2024-06-14 PROCEDURE — 77067 SCR MAMMO BI INCL CAD: CPT

## 2024-06-14 PROCEDURE — 77063 BREAST TOMOSYNTHESIS BI: CPT

## 2024-06-14 NOTE — Clinical Note
Dragan Cobos. Patient states doing very well on current pharmacotherapy, does not report any adverse effects or signs of worsening heart failure at this time, denies abnormal bruising or bleeding. Discussed regular monitoring of blood pressure to ensure blood pressure is controlled as well as no hypotension. Also discussed regular monitoring of weight to ensure no abnormal fluctuations due to fluid retention.

## 2024-06-14 NOTE — PROGRESS NOTES
I reviewed the progress note and agree with the resident’s findings and plans as written.     Loren Jaramillo, PharmD

## 2024-06-14 NOTE — PROGRESS NOTES
"Pharmacist Clinic: Anticoagulation and Heart Failure Management  Graciela Norman was referred to the Clinical Pharmacy Team for her anticoagulation and Heart Failure management.    Referring Provider:  STACIA Clement-CNP, DNP    Last Appointment w/ Pharmacist: 3/15/2024  Pharmacist Name: Zakia Vincent  _______________________________________________________________________  PHARMACY ASSESSMENT    Review of Past Appointment:   - At last pharmacy appointment, patient was screened for and subsequently enrolled in  PAP for Jardiance and Eliquis. Patient had been off Jardiance for ~1 week prior to previous appointment due to cost burden.    RELEVANT LAB RESULTS  Lab Results   Component Value Date    BILITOT 0.8 06/13/2024    CALCIUM 9.7 06/13/2024    CO2 24 06/13/2024     06/13/2024    CREATININE 1.11 (H) 06/13/2024    GLUCOSE 120 (H) 06/13/2024    ALKPHOS 81 06/13/2024    K 4.4 06/13/2024    PROT 7.9 06/13/2024     (L) 06/13/2024    AST 18 06/13/2024    ALT 20 06/13/2024    BUN 23 06/13/2024    ANIONGAP 13 06/13/2024    MG 1.81 01/02/2024    PHOS 3.3 01/02/2024    ALBUMIN 4.3 06/13/2024    GFRF 67 08/11/2023     Lab Results   Component Value Date    TRIG 136 08/11/2023    CHOL 225 (H) 08/11/2023    HDL 69.0 08/11/2023     No results found for: \"BMCBC\", \"CBCDIF\"   _______________________________________________________________________  ANTICOAGULATION ASSESSMENT    The ASCVD Risk score (Irina POMPA, et al., 2019) failed to calculate for the following reasons:    The patient has a prior MI or stroke diagnosis    DIAGNOSIS: prevention of nonvalvular atrial fibrilliation stroke and systemic embolism  - Patient is projected to be on anticoagulation long term  - [id number] Score: [6] (only included if diagnosis is atrial fibrillation)   Age: [<65 (0)] [65-74 (+1)] [> 75 (+2)]: 2  Sex: [Male/Female (+1)]: 1  CHF history: [No/Yes(+1)]: 1  Hypertension history: [No/Yes(+1)]: 1  Stroke/TIA/thromboembolism " history: [No/Yes(+2)]: 0  Vascular disease history (prior MI, peripheral artery disease, aortic plaque): [No/Yes(+1)]: 0  Diabetes history: [No/Yes(+1)]: 1    CURRENT PHARMACOTHERAPY:   - Eliquis 5 mg BID  - Age 79 years  - Weight 94.3 kg  - Scr 1.11 mg/dL    UPDATE ON PHARMACOTHERAPY:   Affordability/Accessibility:  PAP for Eliquis and Jardiance  Adherence/Organization: no issues per patient  Adverse Effects: no bruising or bleeding per patient    Recent Hospitalizations: none  Recent Falls/Trauma: none  _______________________________________________________________________  CHF ASSESSMENT     Symptom/Staging:  -Most recent ejection fraction: 35%    Guideline-Directed Medical Therapy:  -ARNI: No   -If no, then ACEi/ARB?: Yes, describe: losartan 50 mg 1/2 tablet daily  -Beta Blocker: Yes, describe: metoprolol tartrate 25 mg BID  -MRA: Yes, describe: spironolactone 25 mg daily  -SGLT2i: Yes, describe: Jardiance 10 mg daily    Secondary Prevention:  -The ASCVD Risk score (Irina DK, et al., 2019) failed to calculate for the following reasons:    The patient has a prior MI or stroke diagnosis   -Aspirin 81mg? no  -Statin?: No -allergy listed (myalgia)  -HTN?: Yes, describe: 134/70    CURRENT PHARMACOTHERAPY:   - Jardiance 10 mg daily  - Losartan 50 mg half tablet daily  - Metoprolol tartrate 25 mg BID  - Spironolactone 25 mg daily    UPDATE ON PHARMACOTHERAPY:   Affordability/Accessibility:  PAP for Eliquis and Jardiance  Adherence/Organization: no issues per patient  Adverse Effects: none per patient    Monitoring Weights at Home: Occasionally, not daily  Home Weight Recordings: 208 lbs    Past In Office Weight Readings: 94.3 kg (208 lbs)    Monitoring Blood Pressure at Home: No  Home BP Recordings: None    Past In Office BP Readings: 134/70 (5/14/2024)    HEALTH MANAGEMENT    Edema/swelling: No  Shortness of breath: Yes, upon exertion   Trouble sleeping/lying down: No  Dry/hacking cough: No  Recent  Hospitalizations: No    DISCUSSION/NOTES  -Patient states doing very well on current pharmacotherapy, does not report any adverse effects or signs of worsening heart failure at this time, denies abnormal bruising or bleeding. Discussed regular monitoring of blood pressure to ensure blood pressure is controlled as well as no hypotension. Also discussed regular monitoring of weight to ensure no abnormal fluctuations due to fluid retention.     ______________________________________________________________________  PATIENT EDUCATION/GOALS  - Counseled patient on MOA, expectations, duration of therapy, contraindications, administration, and monitoring parameters  - Counseled patient of side effects that are indicative of bleeding such as dark tarry stool, unexplainable bruising, or vomiting up a coffee ground like substance  - Counseled patient on lifestyle modifications that can decrease your risk of having complications (smoking cessation, losing weight, daily weights, vaccines)  - Counseled patient on fluid intake and weight management. Recommended to not consume more than 2 liters of fliuids per day. If they have gained more than 2-3 pounds within a 24 hours period (or 5 pounds in a week), contact their cardiologist  - Answered all patient questions and concerns  _______________________________________________________________________  RECOMMENDATIONS/PLAN  1. Continue Eliquis 5 mg BID and Jardiance 10 mg daily. Follow up in 3 months to assess continued efficacy/tolerability of pharmacotherapy.    Next Cardiology Appointment: 6/25/2024  Clinical Pharmacist follow up: 9/13/2024  VAF/Application Expiration: Yes    Date: 3/25/2025  Type of Encounter: Isai Berumen Pharm D  PGY-1 Pharmacy Resident, [facility]     Verbal consent to manage patient's drug therapy was obtained from the patient . They were informed they may decline to participate or withdraw from participation in pharmacy services at any  time.    Continue all meds under the continuation of care with the referring provider and clinical pharmacy team.

## 2024-06-18 ENCOUNTER — OFFICE VISIT (OUTPATIENT)
Dept: HEMATOLOGY/ONCOLOGY | Facility: CLINIC | Age: 79
End: 2024-06-18
Payer: MEDICARE

## 2024-06-18 VITALS
DIASTOLIC BLOOD PRESSURE: 84 MMHG | SYSTOLIC BLOOD PRESSURE: 159 MMHG | HEART RATE: 67 BPM | BODY MASS INDEX: 38.77 KG/M2 | TEMPERATURE: 96.8 F | WEIGHT: 211.97 LBS | RESPIRATION RATE: 16 BRPM | OXYGEN SATURATION: 93 %

## 2024-06-18 DIAGNOSIS — C18.2 MALIGNANT NEOPLASM OF ASCENDING COLON (MULTI): Primary | ICD-10-CM

## 2024-06-18 PROCEDURE — 1126F AMNT PAIN NOTED NONE PRSNT: CPT | Performed by: INTERNAL MEDICINE

## 2024-06-18 PROCEDURE — G2211 COMPLEX E/M VISIT ADD ON: HCPCS | Performed by: INTERNAL MEDICINE

## 2024-06-18 PROCEDURE — 99214 OFFICE O/P EST MOD 30 MIN: CPT | Performed by: INTERNAL MEDICINE

## 2024-06-18 PROCEDURE — 3079F DIAST BP 80-89 MM HG: CPT | Performed by: INTERNAL MEDICINE

## 2024-06-18 PROCEDURE — 1159F MED LIST DOCD IN RCRD: CPT | Performed by: INTERNAL MEDICINE

## 2024-06-18 PROCEDURE — 3077F SYST BP >= 140 MM HG: CPT | Performed by: INTERNAL MEDICINE

## 2024-06-18 ASSESSMENT — PAIN SCALES - GENERAL: PAINLEVEL: 0-NO PAIN

## 2024-06-18 NOTE — PROGRESS NOTES
Patient ID: Graciela Norman is a 79 y.o. female.  Referring Physician: No referring provider defined for this encounter.  Primary Care Provider: Becky Gusman MD    ORDERS & PATIENT INSTRUCTIONS:      Patient to go for colonoscopy  RTC 1 yr  CBC, CMP, CEA            ASSESSMENT, PROBLEM LIST, DECISION MAKING, PLAN.      Colon carcinoma diagnosed in spring 2016 after work-up of bowel obstruction underwent right hemicolectomy on 5 May 2016 and was found to have 4 cm well differentiated adenocarcinoma, MSI was stable, T3 N0 M0 disease although only lying lymph nodes were reviewed, there was also concern for radial margin.   Patient was treated with 6-month of adjuvant Xeloda as Oncotype was 26 and less than 12 lymph nodes were obtained at the time of surgery.     Treatment History    2016-7-25: Chemotherapy:  Capecitabine started  2017-1-9: Chemotherapy:  Completion of Capecitabine-- completing 6 months adjuvant therapy    Patient's genetic testing done on August 2022 was negative for Vo syndrome        INTERVAL HISTORY :     Patient returns today for follow-up on stage IIa colon carcinoma was treated with adjuvant Xeloda, she is now 8 years out, she has been doing well, denies any major complaint although in January 2024 she had paroxysmal atrial fibrillation requiring cardioversion.       she denies any headache fever cough chest pain shortness of breath abdominal pain urinary symptom, and is otherwise doing well.        PHYSICAL EXAMINATION:    General: Conscious, alert, oriented x3, not in acute distress.  HEENT:    No icterus.    Chest:Bilateral symmetrical,     CVS:  S1, S2.    Abdomen:  Soft, no palpable mass   Extremities: No clubbing, cyanosis,     Skin: No petechial rash.        ASSESSMENT & PLAN:  Patient with stage IIa colon carcinoma with MSI stable, less than 12 lymph nodes removed and presentation with obstruction so she received adjuvant Xeloda for 6-month, who is now 8 years out, recommend no  "further work-up from oncology standpoint, continued watchful waiting, recommended periodic colonoscopy, patient was  planning to do colonoscopy by Dr. Abdalla in August 2023 although was sick and was not done, but is planning to do later this August 2024.    Continue CEA periodically     I have offered her to be released from our office although patient feels uncomfortable and would like to come back in a year      2. Genetics  Patient's genetic testing done on August 2022 was negative for Vo syndrome          VITALS:   2.05 meters squared /84   Pulse 67   Temp 36 °C (96.8 °F)   Resp 16   Wt 96.2 kg (211 lb 15.6 oz)   SpO2 93%   BMI 38.77 kg/m²     LABS:    CBC:  Recent Labs     06/13/24  1059 05/08/24  1059 01/05/24  0430 01/04/24  0442 01/03/24  0552   WBC 7.1 7.0 7.1 6.3 6.8   HGB 16.3* 15.7 14.5 13.7 13.5   HCT 51.7* 49.7* 44.4 42.4 41.7    276 205 195 189   * 102* 99 98 98       CMP:  Recent Labs     06/13/24  1059 05/08/24  1059 02/07/24  1122 01/05/24  0430 01/04/24  0442 01/03/24  0552 01/02/24  1311   * 135* 137 137 137   < > 137   K 4.4 4.5 4.2 4.0 3.4*   < > 3.8    101 100 104 105   < > 102   CO2 24 27 28 25 24   < > 23   ANIONGAP 13 12 13 12 11   < > 16   BUN 23 16 14 14 12   < > 14   CREATININE 1.11* 0.95 0.97 0.85 0.77   < > 0.92   EGFR 51* 62 61 71 80   < > 65   MG  --   --   --   --   --   --  1.81    < > = values in this interval not displayed.     Recent Labs     06/13/24  1059 05/08/24  1059 02/07/24  1122 08/11/23  1200 06/13/23  1136   ALBUMIN 4.3 4.1 4.3 4.1 4.1   ALKPHOS 81 69 77 68 68   ALT 20 19 28 23 22   AST 18 17 23 21 18   BILITOT 0.8 1.2 1.1 1.8* 1.4*       HEME/ENDO:  Recent Labs     05/08/24  1059 01/02/24  1311 08/11/23  1200 03/09/23  1141 02/07/23  1150 07/07/21  1012 05/18/21  1114   TSH 3.20 2.03 1.98  --   --   --  2.10   [id number]  --   --  996* 798 170* 418 185*        MICRO: No results for input(s): \"ESR\", \"CRP\", \"PROCAL\" in the last " 94124 hours.  No results found for the last 90 days.        TUMOR MARKERS:  Carcinoembryonic AG (ug/L)   Date Value   06/13/2024 2.5                IMAGING:         BI mammo bilateral screening tomosynthesis  Narrative: Interpreted By:  Pancho Briscoe,   STUDY:  BI MAMMO BILATERAL SCREENING TOMOSYNTHESIS;  6/14/2024 1:17 pm      ACCESSION NUMBER(S):  [id number]      ORDERING CLINICIAN:  KARELY REDD      INDICATION:  Screening.      COMPARISON:  06/08/2023 06/06/2020      FINDINGS:  2D and tomosynthesis images were reviewed at 1 mm slice thickness.      Density:  There are areas of scattered fibroglandular tissue.      No suspicious masses or calcifications are identified. CAD was  utilized      Impression: No mammographic evidence of malignancy.      BI-RADS CATEGORY:      BI-RADS Category:  1 Negative.  Recommendation:  Annual Screening.  Recommended Date:  1 Year.  Laterality:  Bilateral.      For any future breast imaging appointments, please call [phone]).              Based on the Tyrer-Cuzick model for breast cancer risk assessment,  the patient's lifetime risk of breast cancer is 3.1%. Patients with  over a 20% lifetime risk of developing breast cancer may benefit from  additional screening with breast MRI or ultrasound. Please note that  this estimate is based on responses provided on the patient  questionnaire. For more information regarding high risk consultation,  please call [phone].      MACRO:  None      Signed by: Pancho Briscoe 6/16/2024 1:12 PM  Dictation workstation:   [id number]       Current Outpatient Medications   Medication Sig Dispense Refill    acetaminophen (Tylenol) 500 mg tablet Take 2 tablets (1,000 mg) by mouth once daily at bedtime.      amiodarone (Pacerone) 200 mg tablet Take 1 tablet (200 mg) by mouth once daily. 90 tablet 3    apixaban (Eliquis) 5 mg tablet Take 1 tablet (5 mg) by mouth 2 times a day. 60 tablet 11    cholecalciferol (Vitamin D-3) 25 MCG  (1000 UT) tablet Take 1 tablet (1,000 Units) by mouth once daily at bedtime.      cyanocobalamin (Vitamin B-12) 1,000 mcg tablet Take 1 tablet (1,000 mcg) by mouth once daily.      dilTIAZem (Cardizem) 60 mg immediate release tablet Take 1 tablet (60 mg) by mouth 2 times a day. 180 tablet 3    empagliflozin (Jardiance) 10 mg Take 1 tablet (10 mg) by mouth once daily. 90 tablet 3    losartan (Cozaar) 50 mg tablet TAKE 0.5 TABLETS (25 MG) BY MOUTH ONCE DAILY. 15 tablet 11    metFORMIN (Glucophage) 500 mg tablet TAKE 1 TABLET BY MOUTH EVERY DAY 90 tablet 1    metoprolol tartrate (Lopressor) 25 mg tablet TAKE 1 TABLET BY MOUTH TWICE A  tablet 1    spironolactone (Aldactone) 25 mg tablet TAKE 1 TABLET BY MOUTH EVERY DAY 90 tablet 0     No current facility-administered medications for this visit.                  SOCIAL HISTORY:    reports that she does not currently use alcohol.   reports no history of drug use.,   Tobacco Use: Medium Risk (5/14/2024)    Patient History     Smoking Tobacco Use: Former     Smokeless Tobacco Use: Never     Passive Exposure: Past       FAMILY HISTORY:  Family History   Problem Relation Name Age of Onset    No Known Problems Mother      No Known Problems Father      Stroke Other paternal uncle        ALLERGY:   Aspirin, Diphenhydramine, Iodinated contrast media, Penicillins, Shellfish derived, Statins-hmg-coa reductase inhibitors, and Sulfa (sulfonamide antibiotics)          Former smoker  She works as a  but not employed at this time.          FAMILY HISTORY:   Mom with Breast in her 70's  Sister with leukemia/lymphoma in her 50's  MGM with colon cancer in her 60's   Farrukhshay polancoolga  Shaina with uterine.  2 cousins with breast cancer  Sister with metastatic ? GYN cancer            Medication reviewed in e-chart  Patient is monitored for medication toxicity  labs reviewed and interpreted independently, X rays independently reviewed  Notes from other physicians involved  in care were reviewed    Charting was completed using voice recognition technology and may include unintended errors.    TODD ROCHE MD, GEORGE.    Rene Vera Master Clinician in Hematology and Oncology  Medical Director, [facility] at [facility].  [place] office  Phone [phone]  Fax      [phone]  [facility] /[place].  Phone [phone]  Fax     [phone]

## 2024-06-18 NOTE — PROGRESS NOTES
Patient will go to Dr Abdalla for colonoscopy in [place].  She will call office if unable to get in or if referral is needed.  Follow-up in 1 year with labs prior.  Call back instructions reviewed.  Patient verbalized understanding.

## 2024-06-25 ENCOUNTER — APPOINTMENT (OUTPATIENT)
Dept: CARDIOLOGY | Facility: CLINIC | Age: 79
End: 2024-06-25
Payer: MEDICARE

## 2024-06-25 VITALS
HEIGHT: 62 IN | SYSTOLIC BLOOD PRESSURE: 148 MMHG | HEART RATE: 71 BPM | WEIGHT: 208 LBS | DIASTOLIC BLOOD PRESSURE: 82 MMHG | BODY MASS INDEX: 38.28 KG/M2 | OXYGEN SATURATION: 98 %

## 2024-06-25 DIAGNOSIS — I48.19 PERSISTENT ATRIAL FIBRILLATION (MULTI): ICD-10-CM

## 2024-06-25 DIAGNOSIS — I50.42 CHRONIC COMBINED SYSTOLIC AND DIASTOLIC HEART FAILURE (MULTI): Primary | ICD-10-CM

## 2024-06-25 DIAGNOSIS — I10 BENIGN HYPERTENSION: ICD-10-CM

## 2024-06-25 LAB — BODY SURFACE AREA: 2.03 M2

## 2024-06-25 PROCEDURE — 3077F SYST BP >= 140 MM HG: CPT | Performed by: STUDENT IN AN ORGANIZED HEALTH CARE EDUCATION/TRAINING PROGRAM

## 2024-06-25 PROCEDURE — 99215 OFFICE O/P EST HI 40 MIN: CPT | Performed by: STUDENT IN AN ORGANIZED HEALTH CARE EDUCATION/TRAINING PROGRAM

## 2024-06-25 PROCEDURE — 1036F TOBACCO NON-USER: CPT | Performed by: STUDENT IN AN ORGANIZED HEALTH CARE EDUCATION/TRAINING PROGRAM

## 2024-06-25 PROCEDURE — 3079F DIAST BP 80-89 MM HG: CPT | Performed by: STUDENT IN AN ORGANIZED HEALTH CARE EDUCATION/TRAINING PROGRAM

## 2024-06-25 PROCEDURE — 93000 ELECTROCARDIOGRAM COMPLETE: CPT | Performed by: STUDENT IN AN ORGANIZED HEALTH CARE EDUCATION/TRAINING PROGRAM

## 2024-06-25 PROCEDURE — 1159F MED LIST DOCD IN RCRD: CPT | Performed by: STUDENT IN AN ORGANIZED HEALTH CARE EDUCATION/TRAINING PROGRAM

## 2024-06-25 NOTE — ADDENDUM NOTE
Encounter addended by: Nataly Elizabeth RN on: 6/25/2024 1:06 PM   Actions taken: Imaging Exam begun

## 2024-06-25 NOTE — PROGRESS NOTES
"    Cardiac Electrophysiology Office Visit     Referred by Nilson Wall MD for   Chief Complaint   Patient presents with    3 month follow up     Atrial Fibrillation     HPI:  Graciela Norman is a 79 y.o. year old female patient with h/o Colon CA s/p Surgery Colon CA resection/Chemo (2016), HTN, HLD, DM (not on insulin), Obesity, Atrial fibrillation presenting today for follow up    Objective  Current Outpatient Medications   Medication Instructions    acetaminophen (TYLENOL) 1,000 mg, oral, Nightly    amiodarone (PACERONE) 200 mg, oral, Daily    cholecalciferol (VITAMIN D-3) 1,000 Units, oral, Nightly    cyanocobalamin (Vitamin B-12) 1,000 mcg tablet 1 tablet, oral, Daily    Eliquis 5 mg, oral, 2 times daily    Jardiance 10 mg, oral, Daily    losartan (COZAAR) 25 mg, oral, Daily    metFORMIN (GLUCOPHAGE) 500 mg, oral, Daily    metoprolol tartrate (LOPRESSOR) 25 mg, oral, 2 times daily    spironolactone (ALDACTONE) 25 mg, oral, Daily         Visit Vitals  /82   Pulse 71   Ht 1.575 m (5' 2\")   Wt 94.3 kg (208 lb)   SpO2 98%   BMI 38.04 kg/m²   OB Status Postmenopausal   Smoking Status Former   BSA 2.03 m²      Physical Exam  Constitutional:       Appearance: Normal appearance.   HENT:      Head: Normocephalic.   Cardiovascular:      Rate and Rhythm: Normal rate. Rhythm irregular. Frequent Extrasystoles are present.     Pulses: Normal pulses.   Pulmonary:      Effort: No respiratory distress.      Breath sounds: No wheezing.   Skin:     General: Skin is warm and dry.      Capillary Refill: Capillary refill takes less than 2 seconds.   Neurological:      Mental Status: She is alert.   Psychiatric:         Mood and Affect: Mood normal.        My Interpretation of Reviewed Study(s):  Echo (Jan 2024): Reduced LV function with an EF of 35% with global hypokinesis.  Severely dilated left atrium moderate right atrium.  Moderate MR.  No pericardial effusion noted.  Nuclear stress test (January 2024): No " definite evidence of ischemia or prior infarct.  Echo (May 2024): Normal left ventricular systolic function with an EF of 60% no regional wall motion abnormalities.  Normal biatrial size.  No pericardial effusion  [id number] Score  Age >= 75: 2   Sex Female: 1   CHF History Yes: 1   HTN Yes: 1   Stroke/TIA/Thromboembolism No: 0   Vascular Dz: CAD/PAD/Aortic Plaque No: 0   DM Yes: 1   Total Score 6     Assessment/Plan   #Persistent atrial fibrillation  #Amiodarone Monitoring  AF Dx History: Seen on ECG since 2021; h/o Cardioversion: No  AAD Use: Amiodarone 200mg (current); Anticoagulation use: Apixaban 5mg BID (current); h/o Ablation: None; [id number] Score: 6.  Patient underwent cardioversion on February 2024.  c/w AC: Apixaban 5mg BID  Stop Diltiazem  c/w Metoprolol tart 25mg BID  c/w Amiodarone 200mg Daily  In long term will consider RFA to allow for rhythm control but allow pt to come of Amiodarone  LFTs WNL from 6/13/24    #HFrEF - Etiology unknown - now recovered  Likely related to tachycardia from atrial fibrillation.  No indication for ICD    #Pre-procedure Planning  Procedure Type: pAF/SVT RFA - EnSite  Planned Date:  8/5/24 - [facility]  Anesthesia Needed: General Anesthesia  Anticoagulation: Apixaban 5mg BID, Does it need to be held: Hold Morning of procedure  Antiplatelet agents: None  SGLT2 Inhibitors: 2 days prior to procedure  Other medication changes needed for procedure: Hold Morning of procedure  Contrast allergy? No  Imaging needed? No  Blood work to be ordered CBC and BMP within 30 days of procedure: Ordered within 30 days of procedure (encourage within 1-2 weeks)    Return to Clinic: Patient should return to the EP Clinic in 3 months    Nilson Woods MD [facility]  Cardiac Electrophysiology  [email or web address]    **Disclaimer: This note was dictated by speech recognition, and every effort has been made to prevent any error in transcription, however minor errors may be present**

## 2024-06-25 NOTE — ADDENDUM NOTE
Encounter addended by: Nataly Elizabeth RN on: 6/25/2024 1:09 PM   Actions taken: Imaging Exam ended

## 2024-06-27 ENCOUNTER — TELEPHONE (OUTPATIENT)
Dept: CARDIOLOGY | Facility: HOSPITAL | Age: 79
End: 2024-06-27
Payer: MEDICARE

## 2024-06-27 NOTE — ADDENDUM NOTE
Encounter addended by: Nataly Elizabeth RN on: 6/27/2024 11:27 AM   Actions taken: Imaging Exam ended

## 2024-06-27 NOTE — TELEPHONE ENCOUNTER
----- Message from Nataly Larry CMA sent at 6/27/2024  1:47 PM EDT -----  Regarding: RE: AF - 8/5/24  Pt notified and instructions and fu apt mailed to pt  ----- Message -----  From: Nilson Woods MD  Sent: 6/25/2024   1:35 PM EDT  To: Nataly Larry CMA  Subject: AF - 8/5/24                                      Hello,   AF - orders are in, can you call pt with confirmation and instructions please.     Procedure Type: pAF/SVT RFA - EnSite  Planned Date:  8/5/24 - [facility]  Anesthesia Needed: General Anesthesia  Anticoagulation: Apixaban 5mg BID, Does it need to be held: Hold Morning of procedure  Antiplatelet agents: None  SGLT2 Inhibitors: 2 days prior to procedure  Other medication changes needed for procedure: Hold Morning of procedure  Contrast allergy? No  Imaging needed? No  Blood work to be ordered CBC and BMP within 30 days of procedure: Ordered within 30 days of procedure (encourage within 1-2 weeks)      Sincerely,   HOLLIS

## 2024-07-01 PROCEDURE — RXMED WILLOW AMBULATORY MEDICATION CHARGE

## 2024-07-03 ENCOUNTER — PHARMACY VISIT (OUTPATIENT)
Dept: PHARMACY | Facility: CLINIC | Age: 79
End: 2024-07-03
Payer: COMMERCIAL

## 2024-07-25 ENCOUNTER — TELEPHONE (OUTPATIENT)
Dept: CARDIOLOGY | Facility: CLINIC | Age: 79
End: 2024-07-25

## 2024-07-25 ENCOUNTER — APPOINTMENT (OUTPATIENT)
Dept: CARDIOLOGY | Facility: CLINIC | Age: 79
End: 2024-07-25
Payer: MEDICARE

## 2024-07-25 NOTE — TELEPHONE ENCOUNTER
"Pt called in to reschedule today's appointment with  d/t illness. Pt reports having cough, sneezing, headache, and fever. Pt rescheduled for 8/15/24. Pt has ablation scheduled for 8/5/24. Pt inquiring if illness will contradict procedure. Pt advised to seek eval and tx for s/s and pt states \"it's only been one day\". Please advise.  "

## 2024-07-29 ENCOUNTER — LAB (OUTPATIENT)
Dept: LAB | Facility: LAB | Age: 79
End: 2024-07-29
Payer: MEDICARE

## 2024-07-29 DIAGNOSIS — I48.19 PERSISTENT ATRIAL FIBRILLATION (MULTI): ICD-10-CM

## 2024-07-29 LAB
ANION GAP SERPL CALC-SCNC: 13 MMOL/L (ref 10–20)
BUN SERPL-MCNC: 18 MG/DL (ref 6–23)
CALCIUM SERPL-MCNC: 9.6 MG/DL (ref 8.6–10.3)
CHLORIDE SERPL-SCNC: 100 MMOL/L (ref 98–107)
CO2 SERPL-SCNC: 27 MMOL/L (ref 21–32)
CREAT SERPL-MCNC: 1.23 MG/DL (ref 0.5–1.05)
EGFRCR SERPLBLD CKD-EPI 2021: 45 ML/MIN/1.73M*2
ERYTHROCYTE [DISTWIDTH] IN BLOOD BY AUTOMATED COUNT: 12.4 % (ref 11.5–14.5)
GLUCOSE SERPL-MCNC: 115 MG/DL (ref 74–99)
HCT VFR BLD AUTO: 54.2 % (ref 36–46)
HGB BLD-MCNC: 17 G/DL (ref 12–16)
MCH RBC QN AUTO: 32.1 PG (ref 26–34)
MCHC RBC AUTO-ENTMCNC: 31.4 G/DL (ref 32–36)
MCV RBC AUTO: 102 FL (ref 80–100)
NRBC BLD-RTO: 0 /100 WBCS (ref 0–0)
PLATELET # BLD AUTO: 264 X10*3/UL (ref 150–450)
POTASSIUM SERPL-SCNC: 4.8 MMOL/L (ref 3.5–5.3)
RBC # BLD AUTO: 5.3 X10*6/UL (ref 4–5.2)
SODIUM SERPL-SCNC: 135 MMOL/L (ref 136–145)
WBC # BLD AUTO: 5.6 X10*3/UL (ref 4.4–11.3)

## 2024-07-29 PROCEDURE — 80048 BASIC METABOLIC PNL TOTAL CA: CPT

## 2024-07-29 PROCEDURE — 85027 COMPLETE CBC AUTOMATED: CPT

## 2024-07-29 PROCEDURE — 36415 COLL VENOUS BLD VENIPUNCTURE: CPT

## 2024-07-31 PROCEDURE — RXMED WILLOW AMBULATORY MEDICATION CHARGE

## 2024-08-01 ENCOUNTER — PHARMACY VISIT (OUTPATIENT)
Dept: PHARMACY | Facility: CLINIC | Age: 79
End: 2024-08-01
Payer: COMMERCIAL

## 2024-08-02 ENCOUNTER — OFFICE VISIT (OUTPATIENT)
Dept: URGENT CARE | Facility: CLINIC | Age: 79
End: 2024-08-02
Payer: MEDICARE

## 2024-08-02 ENCOUNTER — ANESTHESIA EVENT (OUTPATIENT)
Dept: CARDIOLOGY | Facility: HOSPITAL | Age: 79
End: 2024-08-02
Payer: MEDICARE

## 2024-08-02 VITALS
WEIGHT: 210 LBS | HEIGHT: 62 IN | HEART RATE: 70 BPM | TEMPERATURE: 98.5 F | SYSTOLIC BLOOD PRESSURE: 169 MMHG | BODY MASS INDEX: 38.64 KG/M2 | RESPIRATION RATE: 16 BRPM | DIASTOLIC BLOOD PRESSURE: 100 MMHG | OXYGEN SATURATION: 95 %

## 2024-08-02 DIAGNOSIS — R30.0 DYSURIA: Primary | ICD-10-CM

## 2024-08-02 LAB
POC APPEARANCE, URINE: CLEAR
POC BILIRUBIN, URINE: NEGATIVE
POC BLOOD, URINE: ABNORMAL
POC COLOR, URINE: YELLOW
POC GLUCOSE, URINE: ABNORMAL MG/DL
POC KETONES, URINE: NEGATIVE MG/DL
POC LEUKOCYTES, URINE: ABNORMAL
POC NITRITE,URINE: NEGATIVE
POC PH, URINE: 6 PH
POC PROTEIN, URINE: NEGATIVE MG/DL
POC SPECIFIC GRAVITY, URINE: 1.01
POC UROBILINOGEN, URINE: 0.2 EU/DL

## 2024-08-02 PROCEDURE — 99213 OFFICE O/P EST LOW 20 MIN: CPT | Performed by: NURSE PRACTITIONER

## 2024-08-02 PROCEDURE — 87086 URINE CULTURE/COLONY COUNT: CPT | Performed by: NURSE PRACTITIONER

## 2024-08-02 PROCEDURE — 81003 URINALYSIS AUTO W/O SCOPE: CPT | Performed by: NURSE PRACTITIONER

## 2024-08-02 RX ORDER — GRANULES FOR ORAL 3 G/1
3 POWDER ORAL ONCE
Qty: 6 G | Refills: 0 | Status: SHIPPED | OUTPATIENT
Start: 2024-08-02 | End: 2024-08-02

## 2024-08-02 SDOH — HEALTH STABILITY: MENTAL HEALTH: CURRENT SMOKER: 0

## 2024-08-02 NOTE — ANESTHESIA PREPROCEDURE EVALUATION
Patient: Graciela Norman    Procedure Information       Date/Time: 08/05/24 0800    Procedure: Ablation A-Fib Paroxysmal (99317)    Location: [facility] / [facility]    Providers: Nilson Woods MD            Relevant Problems   Cardiac   (+) Benign hypertension   (+) Persistent atrial fibrillation (Multi)      Neuro   (+) Peripheral neuropathy      GI   (+) Malignant neoplasm of ascending colon (Multi)      /Renal   (+) Recurrent UTI      Liver   (+) Malignant neoplasm of ascending colon (Multi)      Endocrine   (+) Severe obesity (BMI 35.0-39.9) with comorbidity (Multi)   (+) Type 2 diabetes mellitus (Multi)      ID   (+) Recurrent UTI       Clinical information reviewed:      Problems              NPO Detail:  No data recorded     Physical Exam    Airway  Mallampati: III  TM distance: <3 FB  Neck ROM: full     Cardiovascular   Rhythm: irregular  Rate: normal     Dental - normal exam  (+) upper dentures, lower dentures     Pulmonary - normal exam     Abdominal   (+) obese             Anesthesia Plan    History of general anesthesia?: yes  History of complications of general anesthesia?: no    ASA 3     general   (A-LINE PLACEMENT)  The patient is not a current smoker.  Education provided regarding risk of obstructive sleep apnea.  intravenous induction   Postoperative administration of opioids is intended.  Trial extubation is planned.  Anesthetic plan and risks discussed with patient.  Use of blood products discussed with patient who consented to blood products.    Plan discussed with CRNA.

## 2024-08-02 NOTE — PROGRESS NOTES
[facility]  Yane Contreras, APRN-CNP     Visit Note - 8/2/2024 3:57 PM   This note was generated with voice recognition software and may contain errors including spelling, grammar, syntax, and misrecognization of what was dictated.    Patient: Graciela Norman, MRN: [id number], 79 y.o., female   PCP: Becky Gusman MD  ------------------------------------  ALLERGIES:   Allergies   Allergen Reactions    Aspirin Diarrhea    Diphenhydramine Other     Heart races, jittery    Iodinated Contrast Media Unknown    Penicillins Hives    Shellfish Derived Hives    Statins-Hmg-Coa Reductase Inhibitors Myalgia     Muscle aches    Sulfa (Sulfonamide Antibiotics) Rash        CURRENT MEDICATIONS:   Current Outpatient Medications   Medication Instructions    acetaminophen (TYLENOL) 1,000 mg, oral, Nightly    amiodarone (PACERONE) 200 mg, oral, Daily    cholecalciferol (VITAMIN D-3) 1,000 Units, oral, Nightly    cyanocobalamin (Vitamin B-12) 1,000 mcg tablet 1 tablet, oral, Daily    Eliquis 5 mg, oral, 2 times daily    fosfomycin (MONUROL) 3 g, oral, Once, Repeat dose in 2-3 days if symptoms persist.    Jardiance 10 mg, oral, Daily    losartan (COZAAR) 25 mg, oral, Daily    metFORMIN (GLUCOPHAGE) 500 mg, oral, Daily    metoprolol tartrate (LOPRESSOR) 25 mg, oral, 2 times daily    spironolactone (ALDACTONE) 25 mg, oral, Daily     ------------------------------------  PAST MEDICAL HX:  Patient Active Problem List   Diagnosis    Peripheral neuropathy    Type 2 diabetes mellitus (Multi)    Persistent atrial fibrillation (Multi)    Severe obesity (BMI 35.0-39.9) with comorbidity (Multi)    Benign hypertension    Dyslipidemia    Low vitamin D level    Macular puckering of retina    Recurrent UTI    Severe sleep apnea    Stress incontinence in female    Noncompliance with treatment    Chronic combined systolic and diastolic heart failure (Multi)    Allergy to statin medication    Malignant neoplasm of ascending colon  "(Multi)      SURGICAL HX:  Past Surgical History:   Procedure Laterality Date    BREAST BIOPSY Right     OTHER SURGICAL HISTORY  01/27/2020    Colonoscopy    OTHER SURGICAL HISTORY  01/27/2020    Eye surgery    OTHER SURGICAL HISTORY  01/27/2020    Hernia repair    OTHER SURGICAL HISTORY  01/27/2020    Skin lesion excision    OTHER SURGICAL HISTORY  01/27/2020    Colectomy    OTHER SURGICAL HISTORY  01/27/2020    Salpingo-oophorectomy bilateral    OTHER SURGICAL HISTORY  01/27/2020    Ganglion cyst excision    OTHER SURGICAL HISTORY  01/27/2020    Cystoscopy    OTHER SURGICAL HISTORY  01/27/2020    Cataract surgery      FAMILY HX:   No pertinent history.   SOCIAL HX:    reports that she has quit smoking. Her smoking use included cigarettes. She has been exposed to tobacco smoke. She has never used smokeless tobacco.  ------------------------------------  CHIEF COMPLAINT:   Chief Complaint   Patient presents with    UTI     Urinary frequency, urgency, dysuria x 2 days       HISTORY OF PRESENT ILLNESS: The history was obtained from patient. Graciela is a 79 y.o. female, who presents with a chief complaint of dysuria x approx 2-3 days. Has had burning with urination, urinary frequency, and urinary urgency. Also had a little recent diarrhea. She denies any fever/chills, blood in urine, malaise, lethargy, nausea/vomiting, bloating, abdominal distension/\"pain,\" back/flank pain, and vaginal symptoms. Denies any changes in mental status. Patient reports has had similar symptoms in the past (saw Urology \"a long time ago\") and was diagnosed with a UTI. Has been taking \"Cranactin\" and tylenol, with some relief. Has not tried any other OTC medications or conservative measures for her symptoms. Is a former smoker. Reports she has history of A. Fib, and has cardioversion scheduled for Monday.    REVIEW OF SYSTEMS:  10 systems reviewed negative with exception of history of present illness as listed above.    TODAY'S VITALS: BP " "(!) 169/100 (BP Location: Right arm, Patient Position: Sitting, BP Cuff Size: Large adult)   Pulse 70   Temp 36.9 °C (98.5 °F) (Temporal)   Resp 16   Ht 1.575 m (5' 2\")   Wt 95.3 kg (210 lb)   SpO2 95%   BMI 38.41 kg/m²   Recheck BP: 160/85- reports she is very anxious.    PHYSICAL EXAMINATION:  General: Pleasant, well-nourished female; alert and oriented, in no acute distress. Sitting comfortably on exam table.    Eyes:  Pupils equal, round and reactive to light. Non-icteric; conjunctiva clear.   HENT: Normocephalic. Oral mucosa moist.   Neck:  Supple. No lymphadenopathy.  Respiratory:  Lungs are clear to auscultation; no wheezes, rhonchi, or rales. Respirations unlabored, Breath sounds are equal, Symmetrical chest wall expansion.  Cardiovascular:  Irregularly irregular rhythm, regular rate, Normal S1S2. No m/r/g.  Gastrointestinal:  Soft, non-tender, non-distended; no palpable masses or organomegaly. Bowel sounds normoactive. No CVA tenderness.  Musculoskeletal:  Grossly normal; appropriate for age.     Integumentary:  Pink, warm, dry, and intact. No rashes or skin discoloration appreciated. Good skin turgor.  Neurologic:  Alert and oriented; grossly intact.    Cognition and Speech:  Oriented, Speech clear and coherent.    Psychiatric:  Cooperative, Appropriate mood & affect.       ------------------------------------  Medical Decision Making  LABORATORY or RADIOLOGICAL IMAGING ORDERS/RESULTS:   Recent Results (from the past 24 hour(s))   POCT UA Automated manually resulted    Collection Time: 08/02/24  4:00 PM   Result Value Ref Range    POC Color, Urine Yellow Straw, Yellow, Light-Yellow    POC Appearance, Urine Clear Clear    POC Glucose, Urine >=1000 (4+) (A) NEGATIVE mg/dl    POC Bilirubin, Urine NEGATIVE NEGATIVE    POC Ketones, Urine NEGATIVE NEGATIVE mg/dl    POC Specific Gravity, Urine 1.015 1.005 - 1.035    POC Blood, Urine TRACE-Intact (A) NEGATIVE    POC PH, Urine 6.0 No Reference Range " Established PH    POC Protein, Urine NEGATIVE NEGATIVE, 30 (1+) mg/dl    POC Urobilinogen, Urine 0.2 0.2, 1.0 EU/DL    Poc Nitrite, Urine NEGATIVE NEGATIVE    POC Leukocytes, Urine TRACE (A) NEGATIVE         IMPRESSION/PLAN:  Course: Worsening; stable    1. Dysuria  - POCT UA Automated manually resulted  - Urine Culture  - fosfomycin (Monurol) 3 gram packet; Take 3 g by mouth 1 time for 1 dose. Repeat dose in 2-3 days if symptoms persist.  Dispense: 6 g; Refill: 0    Symptoms/exam consistent with UTI, although reviewed other potential etiologies. No CVA tenderness, is afebrile, and no other red flags on exam. Patient will be discharged home with oral antibiotics. Has multiple medication allergies (PN, sulfa, Macrobid - hives), and also has A. Fib (scheduled for cardioversion) and on amiodarone, so reluctant to use any medications that potentially cause QT prolongation. Will attempt treatment with Fosfomycin today - discussed single dose vs 3 dose treatment regimen - patient requesting 2 dose regimen, as she has cardioversion scheduled for Monday. Instructed to begin antibiotic ASAP (reviewed expectations and common side effects of treatment), and complete full course of medication, even if symptoms resolve more quickly. Also advised to push fluids, rest, and to use appropriate over the counter medications for management of symptoms - cranberry juice/pills may be helpful. Advised to follow-up with primary care provider or ER ASAP if develops fever/chills, body aches, malaise, back/abdominal pain, nausea/vomiting, mental status changes, or if additional concerns/red flags develop. Should also continue regular follow up with PCP re: diabetes, and should contact Cardiologist to let them know she was seen today and started on an antibiotic for a presumptive UTI. Patient agreed with plan of care; questions were encouraged and answered.    Sending urine for culture to ensure appropriate antibiotic coverage - will contact  with results/recommendations.       STACIA Molina-CNP   Advanced Practice Provider  [facility]

## 2024-08-04 LAB — BACTERIA UR CULT: ABNORMAL

## 2024-08-05 ENCOUNTER — HOSPITAL ENCOUNTER (OUTPATIENT)
Dept: CARDIOLOGY | Facility: HOSPITAL | Age: 79
Setting detail: OUTPATIENT SURGERY
Discharge: HOME | End: 2024-08-05
Payer: MEDICARE

## 2024-08-05 ENCOUNTER — TELEPHONE (OUTPATIENT)
Dept: URGENT CARE | Facility: CLINIC | Age: 79
End: 2024-08-05

## 2024-08-05 ENCOUNTER — ANESTHESIA (OUTPATIENT)
Dept: CARDIOLOGY | Facility: HOSPITAL | Age: 79
End: 2024-08-05
Payer: MEDICARE

## 2024-08-05 ENCOUNTER — PHARMACY VISIT (OUTPATIENT)
Dept: PHARMACY | Facility: CLINIC | Age: 79
End: 2024-08-05
Payer: COMMERCIAL

## 2024-08-05 ENCOUNTER — HOSPITAL ENCOUNTER (OUTPATIENT)
Facility: HOSPITAL | Age: 79
Setting detail: OUTPATIENT SURGERY
Discharge: HOME | End: 2024-08-05
Attending: STUDENT IN AN ORGANIZED HEALTH CARE EDUCATION/TRAINING PROGRAM | Admitting: STUDENT IN AN ORGANIZED HEALTH CARE EDUCATION/TRAINING PROGRAM
Payer: MEDICARE

## 2024-08-05 VITALS
DIASTOLIC BLOOD PRESSURE: 61 MMHG | WEIGHT: 208.56 LBS | OXYGEN SATURATION: 97 % | SYSTOLIC BLOOD PRESSURE: 127 MMHG | HEIGHT: 62 IN | BODY MASS INDEX: 38.38 KG/M2 | TEMPERATURE: 97 F | HEART RATE: 64 BPM | RESPIRATION RATE: 18 BRPM

## 2024-08-05 DIAGNOSIS — Z86.79 S/P ABLATION OPERATION FOR ARRHYTHMIA: ICD-10-CM

## 2024-08-05 DIAGNOSIS — I48.19 PERSISTENT ATRIAL FIBRILLATION (MULTI): Primary | ICD-10-CM

## 2024-08-05 DIAGNOSIS — E11.65 TYPE 2 DIABETES MELLITUS WITH HYPERGLYCEMIA (MULTI): ICD-10-CM

## 2024-08-05 DIAGNOSIS — I24.9 ACS (ACUTE CORONARY SYNDROME) (MULTI): ICD-10-CM

## 2024-08-05 DIAGNOSIS — Z98.890 S/P ABLATION OPERATION FOR ARRHYTHMIA: ICD-10-CM

## 2024-08-05 LAB
BACTERIA UR CULT: ABNORMAL
BASOPHILS # BLD AUTO: 0.07 X10*3/UL (ref 0–0.1)
BASOPHILS NFR BLD AUTO: 1 %
EOSINOPHIL # BLD AUTO: 0.12 X10*3/UL (ref 0–0.4)
EOSINOPHIL NFR BLD AUTO: 1.7 %
ERYTHROCYTE [DISTWIDTH] IN BLOOD BY AUTOMATED COUNT: 12.8 % (ref 11.5–14.5)
GLUCOSE BLD MANUAL STRIP-MCNC: 156 MG/DL (ref 74–99)
HCT VFR BLD AUTO: 48.8 % (ref 36–46)
HGB BLD-MCNC: 16.8 G/DL (ref 12–16)
IMM GRANULOCYTES # BLD AUTO: 0.09 X10*3/UL (ref 0–0.5)
IMM GRANULOCYTES NFR BLD AUTO: 1.2 % (ref 0–0.9)
LYMPHOCYTES # BLD AUTO: 2.37 X10*3/UL (ref 0.8–3)
LYMPHOCYTES NFR BLD AUTO: 32.8 %
MCH RBC QN AUTO: 33.3 PG (ref 26–34)
MCHC RBC AUTO-ENTMCNC: 34.4 G/DL (ref 32–36)
MCV RBC AUTO: 97 FL (ref 80–100)
MONOCYTES # BLD AUTO: 0.65 X10*3/UL (ref 0.05–0.8)
MONOCYTES NFR BLD AUTO: 9 %
NEUTROPHILS # BLD AUTO: 3.93 X10*3/UL (ref 1.6–5.5)
NEUTROPHILS NFR BLD AUTO: 54.3 %
NRBC BLD-RTO: 0 /100 WBCS (ref 0–0)
PLATELET # BLD AUTO: 270 X10*3/UL (ref 150–450)
RBC # BLD AUTO: 5.05 X10*6/UL (ref 4–5.2)
WBC # BLD AUTO: 7.2 X10*3/UL (ref 4.4–11.3)

## 2024-08-05 PROCEDURE — C1894 INTRO/SHEATH, NON-LASER: HCPCS | Performed by: STUDENT IN AN ORGANIZED HEALTH CARE EDUCATION/TRAINING PROGRAM

## 2024-08-05 PROCEDURE — 36620 INSERTION CATHETER ARTERY: CPT | Mod: 59 | Performed by: STUDENT IN AN ORGANIZED HEALTH CARE EDUCATION/TRAINING PROGRAM

## 2024-08-05 PROCEDURE — 2500000004 HC RX 250 GENERAL PHARMACY W/ HCPCS (ALT 636 FOR OP/ED): Performed by: NURSE PRACTITIONER

## 2024-08-05 PROCEDURE — 7100000009 HC PHASE TWO TIME - INITIAL BASE CHARGE: Performed by: STUDENT IN AN ORGANIZED HEALTH CARE EDUCATION/TRAINING PROGRAM

## 2024-08-05 PROCEDURE — 99100 ANES PT EXTEME AGE<1 YR&>70: CPT | Performed by: ANESTHESIOLOGY

## 2024-08-05 PROCEDURE — 85025 COMPLETE CBC W/AUTO DIFF WBC: CPT | Performed by: STUDENT IN AN ORGANIZED HEALTH CARE EDUCATION/TRAINING PROGRAM

## 2024-08-05 PROCEDURE — 76937 US GUIDE VASCULAR ACCESS: CPT | Performed by: STUDENT IN AN ORGANIZED HEALTH CARE EDUCATION/TRAINING PROGRAM

## 2024-08-05 PROCEDURE — 36415 COLL VENOUS BLD VENIPUNCTURE: CPT | Performed by: STUDENT IN AN ORGANIZED HEALTH CARE EDUCATION/TRAINING PROGRAM

## 2024-08-05 PROCEDURE — C1759 CATH, INTRA ECHOCARDIOGRAPHY: HCPCS | Performed by: STUDENT IN AN ORGANIZED HEALTH CARE EDUCATION/TRAINING PROGRAM

## 2024-08-05 PROCEDURE — 2500000004 HC RX 250 GENERAL PHARMACY W/ HCPCS (ALT 636 FOR OP/ED): Performed by: STUDENT IN AN ORGANIZED HEALTH CARE EDUCATION/TRAINING PROGRAM

## 2024-08-05 PROCEDURE — 7100000010 HC PHASE TWO TIME - EACH INCREMENTAL 1 MINUTE: Performed by: STUDENT IN AN ORGANIZED HEALTH CARE EDUCATION/TRAINING PROGRAM

## 2024-08-05 PROCEDURE — 2700000047 HC OR 270 NO HCPCS: Performed by: STUDENT IN AN ORGANIZED HEALTH CARE EDUCATION/TRAINING PROGRAM

## 2024-08-05 PROCEDURE — RXMED WILLOW AMBULATORY MEDICATION CHARGE

## 2024-08-05 PROCEDURE — 2500000004 HC RX 250 GENERAL PHARMACY W/ HCPCS (ALT 636 FOR OP/ED): Performed by: NURSE ANESTHETIST, CERTIFIED REGISTERED

## 2024-08-05 PROCEDURE — C1751 CATH, INF, PER/CENT/MIDLINE: HCPCS | Performed by: STUDENT IN AN ORGANIZED HEALTH CARE EDUCATION/TRAINING PROGRAM

## 2024-08-05 PROCEDURE — 2500000005 HC RX 250 GENERAL PHARMACY W/O HCPCS: Performed by: NURSE ANESTHETIST, CERTIFIED REGISTERED

## 2024-08-05 PROCEDURE — 2720000007 HC OR 272 NO HCPCS: Performed by: STUDENT IN AN ORGANIZED HEALTH CARE EDUCATION/TRAINING PROGRAM

## 2024-08-05 PROCEDURE — 82947 ASSAY GLUCOSE BLOOD QUANT: CPT

## 2024-08-05 PROCEDURE — 36620 INSERTION CATHETER ARTERY: CPT | Performed by: ANESTHESIOLOGY

## 2024-08-05 PROCEDURE — C1760 CLOSURE DEV, VASC: HCPCS | Performed by: STUDENT IN AN ORGANIZED HEALTH CARE EDUCATION/TRAINING PROGRAM

## 2024-08-05 PROCEDURE — C1769 GUIDE WIRE: HCPCS | Performed by: STUDENT IN AN ORGANIZED HEALTH CARE EDUCATION/TRAINING PROGRAM

## 2024-08-05 PROCEDURE — 2780000003 HC OR 278 NO HCPCS: Performed by: STUDENT IN AN ORGANIZED HEALTH CARE EDUCATION/TRAINING PROGRAM

## 2024-08-05 PROCEDURE — C1766 INTRO/SHEATH,STRBLE,NON-PEEL: HCPCS | Performed by: STUDENT IN AN ORGANIZED HEALTH CARE EDUCATION/TRAINING PROGRAM

## 2024-08-05 PROCEDURE — A93656 PR EPHYS EVL TRNSPTL TX ATRIAL FIB ISOLAT PULM VEIN: Performed by: ANESTHESIOLOGY

## 2024-08-05 PROCEDURE — 3700000001 HC GENERAL ANESTHESIA TIME - INITIAL BASE CHARGE: Performed by: STUDENT IN AN ORGANIZED HEALTH CARE EDUCATION/TRAINING PROGRAM

## 2024-08-05 PROCEDURE — C1730 CATH, EP, 19 OR FEW ELECT: HCPCS | Performed by: STUDENT IN AN ORGANIZED HEALTH CARE EDUCATION/TRAINING PROGRAM

## 2024-08-05 PROCEDURE — 93656 COMPRE EP EVAL ABLTJ ATR FIB: CPT | Performed by: STUDENT IN AN ORGANIZED HEALTH CARE EDUCATION/TRAINING PROGRAM

## 2024-08-05 PROCEDURE — 3700000002 HC GENERAL ANESTHESIA TIME - EACH INCREMENTAL 1 MINUTE: Performed by: STUDENT IN AN ORGANIZED HEALTH CARE EDUCATION/TRAINING PROGRAM

## 2024-08-05 PROCEDURE — A93656 PR EPHYS EVL TRNSPTL TX ATRIAL FIB ISOLAT PULM VEIN: Performed by: NURSE ANESTHETIST, CERTIFIED REGISTERED

## 2024-08-05 PROCEDURE — 76937 US GUIDE VASCULAR ACCESS: CPT | Performed by: ANESTHESIOLOGY

## 2024-08-05 PROCEDURE — 93005 ELECTROCARDIOGRAM TRACING: CPT

## 2024-08-05 RX ORDER — HEPARIN SODIUM 1000 [USP'U]/ML
INJECTION, SOLUTION INTRAVENOUS; SUBCUTANEOUS AS NEEDED
Status: DISCONTINUED | OUTPATIENT
Start: 2024-08-05 | End: 2024-08-05 | Stop reason: HOSPADM

## 2024-08-05 RX ORDER — ONDANSETRON HYDROCHLORIDE 2 MG/ML
INJECTION, SOLUTION INTRAVENOUS AS NEEDED
Status: DISCONTINUED | OUTPATIENT
Start: 2024-08-05 | End: 2024-08-05

## 2024-08-05 RX ORDER — PROCHLORPERAZINE EDISYLATE 5 MG/ML
5 INJECTION INTRAMUSCULAR; INTRAVENOUS ONCE AS NEEDED
Status: DISCONTINUED | OUTPATIENT
Start: 2024-08-05 | End: 2024-08-05 | Stop reason: HOSPADM

## 2024-08-05 RX ORDER — PROTAMINE SULFATE 10 MG/ML
INJECTION, SOLUTION INTRAVENOUS AS NEEDED
Status: DISCONTINUED | OUTPATIENT
Start: 2024-08-05 | End: 2024-08-05

## 2024-08-05 RX ORDER — PANTOPRAZOLE SODIUM 40 MG/1
40 TABLET, DELAYED RELEASE ORAL DAILY
Qty: 30 TABLET | Refills: 0 | Status: SHIPPED | OUTPATIENT
Start: 2024-08-05 | End: 2024-09-04

## 2024-08-05 RX ORDER — NORETHINDRONE AND ETHINYL ESTRADIOL 0.5-0.035
KIT ORAL AS NEEDED
Status: DISCONTINUED | OUTPATIENT
Start: 2024-08-05 | End: 2024-08-05

## 2024-08-05 RX ORDER — SODIUM CHLORIDE, SODIUM LACTATE, POTASSIUM CHLORIDE, CALCIUM CHLORIDE 600; 310; 30; 20 MG/100ML; MG/100ML; MG/100ML; MG/100ML
100 INJECTION, SOLUTION INTRAVENOUS CONTINUOUS
Status: DISCONTINUED | OUTPATIENT
Start: 2024-08-05 | End: 2024-08-05 | Stop reason: HOSPADM

## 2024-08-05 RX ORDER — ONDANSETRON HYDROCHLORIDE 2 MG/ML
4 INJECTION, SOLUTION INTRAVENOUS ONCE AS NEEDED
Status: DISCONTINUED | OUTPATIENT
Start: 2024-08-05 | End: 2024-08-05 | Stop reason: HOSPADM

## 2024-08-05 RX ORDER — COLCHICINE 0.6 MG/1
0.6 TABLET ORAL DAILY
Qty: 7 TABLET | Refills: 0 | Status: SHIPPED | OUTPATIENT
Start: 2024-08-05 | End: 2024-08-12

## 2024-08-05 RX ORDER — SUCCINYLCHOLINE CHLORIDE 20 MG/ML
INJECTION INTRAMUSCULAR; INTRAVENOUS AS NEEDED
Status: DISCONTINUED | OUTPATIENT
Start: 2024-08-05 | End: 2024-08-05

## 2024-08-05 RX ORDER — LIDOCAINE HYDROCHLORIDE 10 MG/ML
0.1 INJECTION, SOLUTION EPIDURAL; INFILTRATION; INTRACAUDAL; PERINEURAL ONCE
Status: DISCONTINUED | OUTPATIENT
Start: 2024-08-05 | End: 2024-08-05 | Stop reason: HOSPADM

## 2024-08-05 RX ORDER — HEPARIN SODIUM 200 [USP'U]/100ML
INJECTION, SOLUTION INTRAVENOUS CONTINUOUS PRN
Status: COMPLETED | OUTPATIENT
Start: 2024-08-05 | End: 2024-08-05

## 2024-08-05 RX ORDER — PROPOFOL 10 MG/ML
INJECTION, EMULSION INTRAVENOUS AS NEEDED
Status: DISCONTINUED | OUTPATIENT
Start: 2024-08-05 | End: 2024-08-05

## 2024-08-05 RX ORDER — HYDROMORPHONE HYDROCHLORIDE 0.2 MG/ML
0.2 INJECTION INTRAMUSCULAR; INTRAVENOUS; SUBCUTANEOUS EVERY 5 MIN PRN
Status: DISCONTINUED | OUTPATIENT
Start: 2024-08-05 | End: 2024-08-05 | Stop reason: HOSPADM

## 2024-08-05 RX ORDER — ROCURONIUM BROMIDE 10 MG/ML
INJECTION, SOLUTION INTRAVENOUS AS NEEDED
Status: DISCONTINUED | OUTPATIENT
Start: 2024-08-05 | End: 2024-08-05

## 2024-08-05 RX ORDER — LIDOCAINE HYDROCHLORIDE 20 MG/ML
INJECTION, SOLUTION INFILTRATION; PERINEURAL AS NEEDED
Status: DISCONTINUED | OUTPATIENT
Start: 2024-08-05 | End: 2024-08-05

## 2024-08-05 RX ORDER — METFORMIN HYDROCHLORIDE 500 MG/1
500 TABLET ORAL DAILY
Qty: 90 TABLET | Refills: 1 | Status: SHIPPED | OUTPATIENT
Start: 2024-08-05

## 2024-08-05 RX ORDER — IPRATROPIUM BROMIDE 0.5 MG/2.5ML
500 SOLUTION RESPIRATORY (INHALATION) ONCE
Status: DISCONTINUED | OUTPATIENT
Start: 2024-08-05 | End: 2024-08-05 | Stop reason: HOSPADM

## 2024-08-05 RX ORDER — MIDAZOLAM HYDROCHLORIDE 1 MG/ML
INJECTION, SOLUTION INTRAMUSCULAR; INTRAVENOUS AS NEEDED
Status: DISCONTINUED | OUTPATIENT
Start: 2024-08-05 | End: 2024-08-05

## 2024-08-05 RX ORDER — PHENYLEPHRINE HCL IN 0.9% NACL 1 MG/10 ML
SYRINGE (ML) INTRAVENOUS AS NEEDED
Status: DISCONTINUED | OUTPATIENT
Start: 2024-08-05 | End: 2024-08-05

## 2024-08-05 RX ORDER — SODIUM CHLORIDE 9 MG/ML
30 INJECTION, SOLUTION INTRAVENOUS CONTINUOUS
Status: DISCONTINUED | OUTPATIENT
Start: 2024-08-05 | End: 2024-08-05 | Stop reason: HOSPADM

## 2024-08-05 RX ORDER — ACETAMINOPHEN 325 MG/1
650 TABLET ORAL EVERY 4 HOURS PRN
Status: DISCONTINUED | OUTPATIENT
Start: 2024-08-05 | End: 2024-08-05 | Stop reason: HOSPADM

## 2024-08-05 RX ORDER — ACETAMINOPHEN 325 MG/1
650 TABLET ORAL EVERY 4 HOURS PRN
Status: CANCELLED | OUTPATIENT
Start: 2024-08-05

## 2024-08-05 RX ORDER — ALBUTEROL SULFATE 0.83 MG/ML
2.5 SOLUTION RESPIRATORY (INHALATION) ONCE AS NEEDED
Status: DISCONTINUED | OUTPATIENT
Start: 2024-08-05 | End: 2024-08-05 | Stop reason: HOSPADM

## 2024-08-05 RX ORDER — FENTANYL CITRATE 50 UG/ML
INJECTION, SOLUTION INTRAMUSCULAR; INTRAVENOUS AS NEEDED
Status: DISCONTINUED | OUTPATIENT
Start: 2024-08-05 | End: 2024-08-05

## 2024-08-05 ASSESSMENT — PAIN SCALES - GENERAL
PAINLEVEL_OUTOF10: 2
PAINLEVEL_OUTOF10: 0 - NO PAIN
PAINLEVEL_OUTOF10: 5 - MODERATE PAIN
PAINLEVEL_OUTOF10: 4
PAINLEVEL_OUTOF10: 0 - NO PAIN
PAINLEVEL_OUTOF10: 2
PAINLEVEL_OUTOF10: 0 - NO PAIN
PAINLEVEL_OUTOF10: 4
PAINLEVEL_OUTOF10: 0 - NO PAIN
PAINLEVEL_OUTOF10: 0 - NO PAIN

## 2024-08-05 ASSESSMENT — ENCOUNTER SYMPTOMS
WOUND: 0
FATIGUE: 0
SHORTNESS OF BREATH: 1
DIZZINESS: 0
ABDOMINAL PAIN: 0
NAUSEA: 0
DYSURIA: 0
FREQUENCY: 0
VOMITING: 0
PALPITATIONS: 1

## 2024-08-05 ASSESSMENT — PAIN - FUNCTIONAL ASSESSMENT
PAIN_FUNCTIONAL_ASSESSMENT: 0-10

## 2024-08-05 ASSESSMENT — PAIN DESCRIPTION - LOCATION: LOCATION: BACK

## 2024-08-05 NOTE — TELEPHONE ENCOUNTER
Wireless customer is not available, no voicemail set up.  Unable to make contact at this time as patient was scheduled for procedure through cardiology for today

## 2024-08-05 NOTE — Clinical Note
Accessed site: left femoral vein.   Ultrasound guidance was used. 18 gauge needle for access, attempted to advance 0.035 J wire, needle removed and manual pressure held

## 2024-08-05 NOTE — ANESTHESIA PROCEDURE NOTES
Arterial Line:    Date/Time: 8/5/2024 9:28 AM    Staffing  Performed: attending   Authorized by: Nilson Woods MD    Performed by: Nilson Woods MD    An arterial line was placed. Procedure performed using ultrasound guidance.in the OR for the following indication(s): continuous blood pressure monitoring and blood sampling needed.    A  (size) (length) (type) catheter was placed into the Right femoral artery, secured by Events:  greater than 3 attempts.      Additional notes:  Two attempts Dr. Mohamud R radial and R brachial. One attempt this CRNA L radial. Pressure dressings applied.

## 2024-08-05 NOTE — Clinical Note
Accessed site: right femoral artery.   Ultrasound guidance was used. 4 Setswana micropuncture. Wire advanced followed by 4 Setswana sheath, 0.035 J wire removed

## 2024-08-05 NOTE — Clinical Note
Accessed site: left femoral vein.   Ultrasound guidance was used. 4 Bahamian micropuncture. Wire advanced followed by 4 Bahamian sheath, 0.035 J wire advanced and sheath removed

## 2024-08-05 NOTE — POST-PROCEDURE NOTE
"Physician Transition of Care Summary  Cardiac Electrophysiology Lab/OR    Procedure Date: 8/5/2024  Attending:    Bradley Woods - Primary    Resident/Fellow/Other Assistant: Surgeons and Role:  * No surgeons found with a matching role *    Indications:   Pre-op Diagnosis      * Persistent atrial fibrillation (Multi) [I48.19]    Post-procedure diagnosis:   Post-op Diagnosis     * Persistent atrial fibrillation (Multi) [I48.19]    Procedure(s):   Ablation A-Fib Paroxysmal (11951)  67131 - DC COMPRE EP EVAL ABLTJ ATR FIB PULM VEIN ISOLATION      Summary:  Acutely successful radiofrequency ablation for Atrial fibrillation -pulmonary vein isolation  Vascular closure: RFV Perclose, LFV Perclose, RFA manual pressure    Recommendation:  Tentatively patient will be discharged Today, following bed rest and subsequent ambulation, provided the recovery parameters are appropriate.   Resume AC Apixaban 5mg BID @ 1800 on 8/5/24. Please DO NOT hold blood thinner unless emergency without asking your doctor.  Bedrest for 4 hours post sheath removal  Start Protonix 40mg daily x30days  Start Colchicine 0.6mg Daily x7 days  Resume rest of home medications    Patient Instructions:  No driving, alcohol or making legal decisions for 24 hours.  Remove band-aid/tegaderm in 1 day.  No heavy lifting, strenuous exercise for 1 week  Please call our office if you notice any groin discharge or swelling or fever.   For cardiology electrophysiology emergencies (such as severe heart racing, bleeding, severe groin pain/swelling, high fever, wound discharge, stroke symptoms, or recent severe chest pain) call [facility]: [phone]    For full procedure note/study review please go to \"Chart review\" --> \"Cardiology\" tab --> Electrophysiology procedure for 8/5/2024    Complications:   None    Stents/Implants:   Implants       No implant documentation for this case.            Anticoagulation/Antiplatelet Plan:   See above in recommendations - Resume " Apixaban 5mg BID     Estimated Blood Loss:   * No values recorded between 5/31/2024  2:17 PM and 5/31/2024  7:50 PM *    Anesthesia: General Anesthesia Staff: Anesthesiologist: Melvin Mohamud MD  CRNA: YANIRA Myrick    Any Specimen(s) Removed:   Order Name Source Comment Collection Info Order Time   COAGULATION SCREEN Blood, Venous   8/5/2024  6:49 AM     [facility]   Immediate        CBC WITH AUTO DIFFERENTIAL Blood, Venous  Collected By: Marivel Reynolds RN 8/5/2024  7:28 AM     Release result to MyChart   Immediate        BASIC METABOLIC PANEL Blood, Venous   8/5/2024  7:28 AM     Release result to MyChart   Immediate            Disposition:   Home    Nilson Woods MD [facility]  Cardiac Electrophysiology    **Disclaimer: This note was dictated by speech recognition, and every effort has been made to prevent any error in transcription, however minor errors may be present**

## 2024-08-05 NOTE — Clinical Note
Accessed site: right femoral vein.   Ultrasound guidance was used. 4 Argentine micropuncture. Wire advanced followed by 4 Argentine sheath, 0.035 J wire advanced and sheath removed

## 2024-08-05 NOTE — NURSING NOTE
1430: pt sat up 30 degrees. Bilateral groin sites stable with no signs of bleeding or hematoma.  1530: pt ambulated in hallway. Bilateral groin sites stable with no signs of bleeding or hematoma. VSS, pain controlled.  1630: discharge instructions given. Pt verbalized understanding. PIV removed. Bilateral groin sites stable with no bleeding or hematoma. VSS with pain controlled. Ok for discharge; Patient left cath lab via wheelchair with belongings and RN

## 2024-08-05 NOTE — H&P
History and Physical   Pre Surgical Review (> 30 days)      Subjective   This is a 79 y.o. female with a PMH significant for sleep, apnea, HTN, HLD, DM and paroxysmal atrial fibrillation.  The patient was diagnosed with atrial fibrillation in April 2020.  She reports palpitations and shortness of breath with episodes of atrial fibrillation.  The patient presents to [facility] today, 8/5/2024 for RFA of atrial fibrillation with Dr. Woods.     PMH:  HTN, HLD, DM, paroxysmal atrial fibrillation, vitamin D deficiency, UTI, chronic combined systolic and diastolic heart failure, sleep apnea  PSH:  eyes surgery, colectomy, hernia repair  Family history:  CVA, breast CA, uterine CA, colon CA  Social history:  Former smoker, daily caffeine use, denies alcohol and illicit drug use      Review of Systems  Review of Systems   Constitutional:  Negative for fatigue.   Respiratory:  Positive for shortness of breath.    Cardiovascular:  Positive for palpitations. Negative for chest pain and leg swelling.   Gastrointestinal:  Negative for abdominal pain, nausea and vomiting.   Genitourinary:  Negative for dysuria and frequency.   Skin:  Negative for pallor, rash and wound.   Neurological:  Negative for dizziness and syncope.           Objective   Temp:  [35.9 °C (96.6 °F)] 35.9 °C (96.6 °F)  Heart Rate:  [69] 69  Resp:  [18] 18  BP: (181)/(77) 181/77    Allergies  Allergies   Allergen Reactions    Aspirin Diarrhea    Diphenhydramine Other     Heart races, jittery    Iodinated Contrast Media Unknown    Penicillins Hives    Shellfish Derived Hives    Macrobid [Nitrofurantoin Monohyd/M-Cryst] Hives    Statins-Hmg-Coa Reductase Inhibitors Myalgia     Muscle aches    Sulfa (Sulfonamide Antibiotics) Rash       Home Medications  Current Outpatient Medications   Medication Instructions    acetaminophen (TYLENOL) 1,000 mg, oral, Nightly    amiodarone (PACERONE) 200 mg, oral, Daily    cholecalciferol (VITAMIN D-3) 1,000 Units, oral,  Nightly    cyanocobalamin (Vitamin B-12) 1,000 mcg tablet 1 tablet, oral, Daily    Eliquis 5 mg, oral, 2 times daily    Jardiance 10 mg, oral, Daily    losartan (COZAAR) 25 mg, oral, Daily    metFORMIN (GLUCOPHAGE) 500 mg, oral, Daily    metoprolol tartrate (LOPRESSOR) 25 mg, oral, 2 times daily    spironolactone (ALDACTONE) 25 mg, oral, Daily        Physical Exam  Physical Exam  Constitutional:       General: She is not in acute distress.     Appearance: She is obese.   Cardiovascular:      Rate and Rhythm: Normal rate and regular rhythm.      Comments: + pulses all extremities.  Pulmonary:      Effort: Pulmonary effort is normal. No respiratory distress.      Comments: Clear bilaterally.  Abdominal:      General: Bowel sounds are normal.   Neurological:      General: No focal deficit present.      Mental Status: She is alert and oriented to person, place, and time.   Psychiatric:         Mood and Affect: Mood normal.         Behavior: Behavior normal.          Airway/Sedation Assessment     Assessment by anesthesia See anesthesia airway/sedation assessment     ·  Mouth Opening OK See anesthesia airway/sedation assessment      Neck Flexibility OK See anesthesia airway/sedation assessment      Loose Teeth See anesthesia airway/sedation assessment   ·  Oropharyngeal Classification See anesthesia airway/sedation assessment   ·  ASA PS Classification ASA III - Patient with severe systemic disease       Sedation Plan See anesthesia airway/sedation assessment     Risks, benefits, and alternatives discussed with patient.     ERAS (Enhanced Recovery After Surgery):  ·  ERAS Patient: No      Consent:   COVID-19 Consent:  ·  COVID-19 Risk Consent Surgeon has reviewed key risks related to the risk of david COVID-19 and if they contract COVID-19 what the risks are.       Mala Ma, APRN-CNP

## 2024-08-05 NOTE — DISCHARGE INSTRUCTIONS
DISCHARGE INSTRUCTIONS     FOR SUDDEN AND SEVERE CHEST PAIN, SHORTNESS OF BREATH, EXCESSIVE BLEEDING, SIGNS OF STROKE, OR CHANGES IN MENTAL STATUS YOU SHOULD CALL 911 IMMEDIATELY.     FOR NEXT 24 HOURS  - Upon discharge, you should return home and rest for the remainder of the day and evening. You do not have to stay on bed rest but should not be very active.  It is recommended a responsible adult be with you for the first 24 hours after the procedure.    - No driving for 24 hours after procedure. Please arrange for someone to drive you home from the hospital today.     - Do not drive, operate machinery, or use power tools for 24 hours after your procedure.     - Do not make any legal decisions for 24 hours after your procedure.     - Do not drink alcoholic beverages for 24 hours after your procedure.    WOUND CARE   *FOR FEMORAL (LEG) ACCESS*  ·      Avoid heavy lifting (over 10 pounds) for 7 days, squatting or excessive bending for 7 days, and strenuous exercise for 7 days.  ·      No submerged bathing, swimming, or hot tubs for the next 7 days, or until fully healed.  ·      Avoid sexual activity for 3-4 days until any groin discomfort has ceased.    - The transparent dressing should be removed from the site 24 hours after the procedure.  Wash the site gently with soap and water. Rinse well and pat dry. Keep the area clean and dry. You may apply a Band-Aid to the site. Avoid lotions, ointments, or powders until fully healed.     - You may shower the day after your procedure.      - It is normal to notice a small bruise around the puncture site and/or a small grape sized or smaller lump. Any large bruising or large lump warrants a call to the office.     - If bleeding should occur, lay down and apply pressure to the affected area for 10 minutes.  If the bleeding stops notify your physician.  If there is a large amount of bleeding or spurting of blood CALL 911 immediately.  DO NOT drive yourself to the  [facility].    - You may experience some tenderness, bruising or minimal inflammation.  If you have any concerns, you may contact the Cath Lab or if any of these symptoms become excessive, contact your cardiologist or go to the emergency room.     OTHER INSTRUCTIONS  - You may take acetaminophen (Tylenol) as directed for discomfort.  If pain is not relieved with acetaminophen (Tylenol), contact your doctor.    - If you notice or experience any of the following, you should notify your doctor or seek medical attention  Chest pain or discomfort  Change in mental status or weakness in extremities.  Dizziness, light headedness, or feeling faint.  Change in the site where the procedure was performed, such as bleeding or an increased area of bruising or swelling.  Tingling, numbness, pain, or coolness in the leg/arm beyond the site where the procedure was performed.  Signs of infection (i.e. shaking chills, temperature > 100 degrees Fahrenheit, warmth, redness) in the leg/arm area where the procedure was performed.  Changes in urination   Bloody or black stools  Vomiting blood  Severe nose bleeds  Any excessive bleeding    - Please resume apixaban (Eliquis) this evening, 8/5/24.    - Please start colchicine 0.6 mg daily for 7 days.    - Please start protonix 40 mg daily for 30 days.    - Please hold Metformin for 48 hours post procedure, resume 8/7/24.    - Please follow up with Dr. Woods as scheduled or sooner if needed, [phone].

## 2024-08-05 NOTE — ANESTHESIA PROCEDURE NOTES
Airway  Date/Time: 8/5/2024 8:15 AM  Urgency: elective    Airway not difficult    Staffing  Performed: resident   Authorized by: Melvin Mohamud MD    Performed by: STACIA Myrick-JERRICA  Patient location during procedure: OR    Indications and Patient Condition  Indications for airway management: anesthesia and airway protection  Spontaneous ventilation: present  Sedation level: deep  Preoxygenated: yes  Patient position: sniffing  MILS maintained throughout  Mask difficulty assessment: 2 - vent by mask + OA or adjuvant +/- NMBA  Planned trial extubation    Final Airway Details  Final airway type: endotracheal airway      Successful airway: ETT  Cuffed: yes   Successful intubation technique: video laryngoscopy  Facilitating devices/methods: intubating stylet  Endotracheal tube insertion site: oral  Blade: John  Blade size: #4  ETT size (mm): 7.0  Cormack-Lehane Classification: grade I - full view of glottis  Placement verified by: capnometry   Measured from: lips  ETT to lips (cm): 22  Number of attempts at approach: 1  Ventilation between attempts: none  Number of other approaches attempted: 0    Additional Comments  Intubated by SAGRARIO Mccoy resident under direct supervision of Dr. Hoover

## 2024-08-05 NOTE — Clinical Note
Accessed site: left femoral vein.   Ultrasound guidance was used. 18 gauge access needle, attemped, manual pressure held

## 2024-08-05 NOTE — Clinical Note
Sheath was exchanged in the right femoral vein with INTRODUCER, AGILIS,  MEDIUM CURL, 8F X 71CM, DUAL.

## 2024-08-06 PROCEDURE — 93005 ELECTROCARDIOGRAM TRACING: CPT

## 2024-08-07 LAB
ATRIAL RATE: 75 BPM
P AXIS: 37 DEGREES
P OFFSET: 189 MS
P ONSET: 144 MS
PR INTERVAL: 168 MS
Q ONSET: 228 MS
QRS COUNT: 11 BEATS
QRS DURATION: 74 MS
QT INTERVAL: 412 MS
QTC CALCULATION(BAZETT): 460 MS
QTC FREDERICIA: 443 MS
R AXIS: -23 DEGREES
T AXIS: -67 DEGREES
T OFFSET: 434 MS
VENTRICULAR RATE: 75 BPM

## 2024-08-14 NOTE — PROGRESS NOTES
"Kirsten Norman is a 79 y.o. female with a medical history of paroxysmal atrial fibrillation on Eliquis who presented with atrial fibrillation and rapid ventricular rates to the hospital January 5, 2024.  Echocardiogram at that time showed moderately reduced ejection fraction of 35% with a globally hypokinetic left ventricle.  Moderate mitral regurgitation was also noted.  Ejection fraction normalized with medical therapy (60% May 2024).  Event monitor at that time showed predominant atrial fibrillation.  She underwent direct-current cardioversion February 1, 2024, and PVI ablation August 5, 2024..  Cardiac issues currently include:    ACC/AHA stage C HFrEF  -\"Cardiomyopathy in remission\"; EF improved from 35-60% (prior to the A-fib ablation) on medical therapy.  -Current GDMT includes Jardiance 10 mg daily, losartan 25 mg daily, Aldactone 25 mg daily.  Notably patient is also on Lopressor 25 mg twice daily, and amiodarone 200 mg daily.    Paroxysmal atrial fibrillation status post PVI ablation August 5, 2024  -Currently in sinus rhythm.    Reports feeling better after her ablation.  Denies any chest discomfort or shortness breath.  Denies any orthopnea/PND.  No lower extremity edema.  No bleeding complications from Eliquis.    Review of Systems  A comprehensive review of systems was negative.     Past Medical History:   Diagnosis Date    Atrial fibrillation (Multi)     Deficiency of other specified B group vitamins 05/24/2021    Low vitamin B12 level    Diabetes (Multi)     FARTUN on CPAP     Personal history of other malignant neoplasm of large intestine 08/15/2022    History of malignant neoplasm of colon       Past Surgical History:   Procedure Laterality Date    BREAST BIOPSY Right     CARDIAC ELECTROPHYSIOLOGY PROCEDURE N/A 8/5/2024    Procedure: Ablation A-Fib Paroxysmal (01496);  Surgeon: Nilson Woods MD;  Location: [facility];  Service: Electrophysiology;  Laterality: N/A;    OTHER " "SURGICAL HISTORY  01/27/2020    Colonoscopy    OTHER SURGICAL HISTORY  01/27/2020    Eye surgery    OTHER SURGICAL HISTORY  01/27/2020    Hernia repair    OTHER SURGICAL HISTORY  01/27/2020    Skin lesion excision    OTHER SURGICAL HISTORY  01/27/2020    Colectomy    OTHER SURGICAL HISTORY  01/27/2020    Salpingo-oophorectomy bilateral    OTHER SURGICAL HISTORY  01/27/2020    Ganglion cyst excision    OTHER SURGICAL HISTORY  01/27/2020    Cystoscopy    OTHER SURGICAL HISTORY  01/27/2020    Cataract surgery       Allergies   Allergen Reactions    Aspirin Diarrhea    Diphenhydramine Other     Heart races, jittery    Iodinated Contrast Media Unknown    Penicillins Hives    Shellfish Derived Hives    Macrobid [Nitrofurantoin Monohyd/M-Cryst] Hives    Statins-Hmg-Coa Reductase Inhibitors Myalgia     Muscle aches    Sulfa (Sulfonamide Antibiotics) Rash       Family History   Problem Relation Name Age of Onset    No Known Problems Mother      No Known Problems Father      Stroke Other paternal uncle        Objective   Visit Vitals  /74   Pulse 56   Ht 1.575 m (5' 2\")   Wt 94.2 kg (207 lb 11.2 oz)   SpO2 99%   BMI 37.99 kg/m²   OB Status Postmenopausal   Smoking Status Former   BSA 2.03 m²     General: awake, alert and oriented. No acute distress.   Skin: Skin is warm, dry and intact without rashes or lesions. Appropriate color for ethnicity. Nail beds pink with no cyanosis or clubbing  HEENT: normocephalic, atraumatic; conjunctivae are clear without exudates or hemorrhage. Sclera is non-icteric. Eyelids are normal in appearance without swelling or lesions. Hearing intact. Nares are patent bilaterally. Moist mucous membranes.   Cardiovascular: Regular. No murmurs, gallops, or rubs are auscultated. S1 and S2 are heard and are of normal intensity. No JVD, no carotid bruits  Respiratory: Thorax symmetric. CTAB, breath sounds vesicular. No crackles, wheezes or ronchi.   Gastrointestinal: soft, non-distended, BS + x " "4  Genitourinary: exam deferred  Musculoskeletal: moves all extremities  Extremities: pulses palpable bilaterally; no swelling or erythema; no edema  Neurological: alert & oriented x 3; no focal deficits  Psychiatric: appropriate mood and affect        Lab Review   Lab Results   Component Value Date    HGB 16.8 (H) 08/05/2024    HGB 17.0 (H) 07/29/2024    HGB 16.3 (H) 06/13/2024     08/05/2024    WBC 7.2 08/05/2024     (L) 07/29/2024    K 4.8 07/29/2024    CREATININE 1.23 (H) 07/29/2024    CREATININE 1.11 (H) 06/13/2024    CREATININE 0.95 05/08/2024    BUN 18 07/29/2024    CALCIUM 9.6 07/29/2024     (H) 01/02/2024    TROPHS 33 (H) 01/02/2024    LDLF 129 (H) 08/11/2023        Assessment/Plan   80 yo Female with a medical history of paroxysmal atrial fibrillation on Eliquis who presented with atrial fibrillation and rapid ventricular rates to the hospital January 5, 2024.  Echocardiogram at that time showed moderately reduced ejection fraction of 35% with a globally hypokinetic left ventricle.  Moderate mitral regurgitation was also noted.  Ejection fraction normalized with medical therapy (60% May 2024).  Event monitor at that time showed predominant atrial fibrillation.  She underwent direct-current cardioversion February 1, 2024, and PVI ablation August 5, 2024..  Cardiac issues currently include:    ACC/AHA stage C HFrEF  -\"Cardiomyopathy in remission\"; EF improved from 35-60% (prior to the A-fib ablation) on medical therapy.  -Current GDMT includes Jardiance 10 mg daily, losartan 25 mg daily, Aldactone 25 mg daily.  Notably patient is also on Lopressor 25 mg twice daily, and amiodarone 200 mg daily.    Paroxysmal atrial fibrillation status post PVI ablation August 5, 2024  -Currently in sinus rhythm.    Plan:  -Appears \"warm and dry\" on exam.  No further up titration of medical therapy is required  -For atrial fibrillation: In sinus rhythm at this time.  Continue Amio/Eliquis.  Has an upcoming " appointment with EP September 24, 2024  -It does appear that her cardiomyopathy was at least partly related to atrial fibrillation; since it did improve with restoration of sinus rhythm with the initial cardioversion.  -RTC 6 months     Markos Brock MD  Advanced Heart Failure/Transplant Cardiology  Cardio-Oncology  [facility]

## 2024-08-15 ENCOUNTER — APPOINTMENT (OUTPATIENT)
Dept: CARDIOLOGY | Facility: CLINIC | Age: 79
End: 2024-08-15
Payer: MEDICARE

## 2024-08-15 VITALS
DIASTOLIC BLOOD PRESSURE: 74 MMHG | WEIGHT: 207.7 LBS | OXYGEN SATURATION: 99 % | HEART RATE: 56 BPM | SYSTOLIC BLOOD PRESSURE: 148 MMHG | HEIGHT: 62 IN | BODY MASS INDEX: 38.22 KG/M2

## 2024-08-15 DIAGNOSIS — I48.19 PERSISTENT ATRIAL FIBRILLATION (MULTI): ICD-10-CM

## 2024-08-15 DIAGNOSIS — Z98.890 S/P ABLATION OPERATION FOR ARRHYTHMIA: Primary | ICD-10-CM

## 2024-08-15 DIAGNOSIS — I50.42 CHRONIC COMBINED SYSTOLIC AND DIASTOLIC HEART FAILURE (MULTI): ICD-10-CM

## 2024-08-15 DIAGNOSIS — Z86.79 S/P ABLATION OPERATION FOR ARRHYTHMIA: Primary | ICD-10-CM

## 2024-08-15 PROCEDURE — 99214 OFFICE O/P EST MOD 30 MIN: CPT | Performed by: STUDENT IN AN ORGANIZED HEALTH CARE EDUCATION/TRAINING PROGRAM

## 2024-08-15 PROCEDURE — 1159F MED LIST DOCD IN RCRD: CPT | Performed by: STUDENT IN AN ORGANIZED HEALTH CARE EDUCATION/TRAINING PROGRAM

## 2024-08-15 PROCEDURE — 1036F TOBACCO NON-USER: CPT | Performed by: STUDENT IN AN ORGANIZED HEALTH CARE EDUCATION/TRAINING PROGRAM

## 2024-08-15 PROCEDURE — 3078F DIAST BP <80 MM HG: CPT | Performed by: STUDENT IN AN ORGANIZED HEALTH CARE EDUCATION/TRAINING PROGRAM

## 2024-08-15 PROCEDURE — 93000 ELECTROCARDIOGRAM COMPLETE: CPT | Performed by: STUDENT IN AN ORGANIZED HEALTH CARE EDUCATION/TRAINING PROGRAM

## 2024-08-15 PROCEDURE — 3077F SYST BP >= 140 MM HG: CPT | Performed by: STUDENT IN AN ORGANIZED HEALTH CARE EDUCATION/TRAINING PROGRAM

## 2024-08-25 DIAGNOSIS — I48.91 ATRIAL FIBRILLATION WITH RVR (MULTI): ICD-10-CM

## 2024-08-26 PROCEDURE — RXMED WILLOW AMBULATORY MEDICATION CHARGE

## 2024-08-26 RX ORDER — SPIRONOLACTONE 25 MG/1
25 TABLET ORAL DAILY
Qty: 90 TABLET | Refills: 0 | Status: SHIPPED | OUTPATIENT
Start: 2024-08-26

## 2024-08-28 ENCOUNTER — PHARMACY VISIT (OUTPATIENT)
Dept: PHARMACY | Facility: CLINIC | Age: 79
End: 2024-08-28
Payer: COMMERCIAL

## 2024-09-05 ENCOUNTER — OFFICE VISIT (OUTPATIENT)
Dept: URGENT CARE | Facility: CLINIC | Age: 79
End: 2024-09-05
Payer: MEDICARE

## 2024-09-05 VITALS
SYSTOLIC BLOOD PRESSURE: 148 MMHG | HEIGHT: 62 IN | DIASTOLIC BLOOD PRESSURE: 86 MMHG | OXYGEN SATURATION: 95 % | HEART RATE: 56 BPM | RESPIRATION RATE: 16 BRPM | WEIGHT: 205 LBS | TEMPERATURE: 96.9 F | BODY MASS INDEX: 37.73 KG/M2

## 2024-09-05 DIAGNOSIS — R30.0 DYSURIA: Primary | ICD-10-CM

## 2024-09-05 LAB
POC APPEARANCE, URINE: CLEAR
POC BILIRUBIN, URINE: NEGATIVE
POC BLOOD, URINE: ABNORMAL
POC COLOR, URINE: YELLOW
POC GLUCOSE, URINE: ABNORMAL MG/DL
POC KETONES, URINE: NEGATIVE MG/DL
POC LEUKOCYTES, URINE: ABNORMAL
POC NITRITE,URINE: NEGATIVE
POC PH, URINE: 6 PH
POC PROTEIN, URINE: ABNORMAL MG/DL
POC SPECIFIC GRAVITY, URINE: 1.01
POC UROBILINOGEN, URINE: 0.2 EU/DL

## 2024-09-05 PROCEDURE — 87086 URINE CULTURE/COLONY COUNT: CPT | Performed by: NURSE PRACTITIONER

## 2024-09-05 PROCEDURE — 99213 OFFICE O/P EST LOW 20 MIN: CPT | Performed by: NURSE PRACTITIONER

## 2024-09-05 PROCEDURE — 81003 URINALYSIS AUTO W/O SCOPE: CPT | Performed by: NURSE PRACTITIONER

## 2024-09-05 RX ORDER — GRANULES FOR ORAL 3 G/1
3 POWDER ORAL ONCE
Qty: 6 G | Refills: 0 | Status: SHIPPED | OUTPATIENT
Start: 2024-09-05 | End: 2024-09-05

## 2024-09-05 NOTE — PROGRESS NOTES
[facility]  Yane Contreras, APRN-CNP     Visit Note - 9/5/2024 11:42 AM   This note was generated with voice recognition software and may contain errors including spelling, grammar, syntax, and misrecognization of what was dictated.    Patient: Graciela Norman, MRN: [id number], 79 y.o., female   PCP: Becky Gusman MD  ------------------------------------  ALLERGIES:   Allergies   Allergen Reactions    Aspirin Diarrhea    Diphenhydramine Other     Heart races, jittery    Iodinated Contrast Media Unknown    Penicillins Hives    Shellfish Derived Hives    Macrobid [Nitrofurantoin Monohyd/M-Cryst] Hives    Statins-Hmg-Coa Reductase Inhibitors Myalgia     Muscle aches    Sulfa (Sulfonamide Antibiotics) Rash        CURRENT MEDICATIONS:   Current Outpatient Medications   Medication Instructions    acetaminophen (TYLENOL) 1,000 mg, oral, Nightly    amiodarone (PACERONE) 200 mg, oral, Daily    cholecalciferol (VITAMIN D-3) 1,000 Units, oral, Nightly    cyanocobalamin (Vitamin B-12) 1,000 mcg tablet 1 tablet, oral, Daily    Eliquis 5 mg, oral, 2 times daily    fosfomycin (MONUROL) 3 g, oral, Once, - mix with 3 to 4 oz cool water before ingesting; do not administer in its dry form or mix with hot water. Repeat dose in 2-3 days if symptoms persist.    Jardiance 10 mg, oral, Daily    losartan (COZAAR) 25 mg, oral, Daily    metFORMIN (GLUCOPHAGE) 500 mg, oral, Daily    metoprolol tartrate (LOPRESSOR) 25 mg, oral, 2 times daily    pantoprazole (PROTONIX) 40 mg, oral, Daily, Do not crush, chew, or split.    spironolactone (ALDACTONE) 25 mg, oral, Daily     ------------------------------------  PAST MEDICAL HX:  Patient Active Problem List   Diagnosis    Peripheral neuropathy    Type 2 diabetes mellitus (Multi)    Persistent atrial fibrillation (Multi)    Severe obesity (BMI 35.0-39.9) with comorbidity (Multi)    Benign hypertension    Dyslipidemia    Low vitamin D level    Macular puckering of retina     "Recurrent UTI    Severe sleep apnea    Stress incontinence in female    Noncompliance with treatment    Chronic combined systolic and diastolic heart failure (Multi)    Allergy to statin medication    Malignant neoplasm of ascending colon (Multi)    S/P ablation operation for arrhythmia      SURGICAL HX:  Past Surgical History:   Procedure Laterality Date    BREAST BIOPSY Right     CARDIAC ELECTROPHYSIOLOGY PROCEDURE N/A 8/5/2024    Procedure: Ablation A-Fib Paroxysmal (63372);  Surgeon: Nilson Woods MD;  Location: [facility] Lab;  Service: Electrophysiology;  Laterality: N/A;    OTHER SURGICAL HISTORY  01/27/2020    Colonoscopy    OTHER SURGICAL HISTORY  01/27/2020    Eye surgery    OTHER SURGICAL HISTORY  01/27/2020    Hernia repair    OTHER SURGICAL HISTORY  01/27/2020    Skin lesion excision    OTHER SURGICAL HISTORY  01/27/2020    Colectomy    OTHER SURGICAL HISTORY  01/27/2020    Salpingo-oophorectomy bilateral    OTHER SURGICAL HISTORY  01/27/2020    Ganglion cyst excision    OTHER SURGICAL HISTORY  01/27/2020    Cystoscopy    OTHER SURGICAL HISTORY  01/27/2020    Cataract surgery      FAMILY HX:   No pertinent history.   SOCIAL HX:    reports that she has quit smoking. Her smoking use included cigarettes. She has been exposed to tobacco smoke. She has never used smokeless tobacco.  ------------------------------------  CHIEF COMPLAINT:   Chief Complaint   Patient presents with    UTI     Frequency and dysuria , off balance X 1 day       HISTORY OF PRESENT ILLNESS: The history was obtained from patient. Graciela is a 79 y.o. female, who presents with a chief complaint of dysuria that started this AM. Has had burning/pain with urination, urinary frequency, and urinary urgency. Reports she had diarrhea after a recent surgery, but it has improved. She denies any fever/chills, blood in urine, malaise, lethargy, nausea/vomiting, bloating, abdominal distension/\"pain,\" back/flank pain, change in bowel " "habits, and vaginal symptoms. Denies any changes in mental status. Patient reports has had similar symptoms in the past and was diagnosed with a UTI- last UTI was on 8/2 and she was treated with Fosfomycin (took two doses) - sxs completely resolved afterwards. Has been taking Tylenol, as well as a cranberry and grapeseed supplement, with some relief. Has not tried any other OTC medications or conservative measures for her symptoms. Is a former smoker. Is diabetic - reports glucose has been running in the 150's - last A1c was 6.6%.     REVIEW OF SYSTEMS:  10 systems reviewed negative with exception of history of present illness as listed above.    TODAY'S VITALS: /86   Pulse 56   Temp 36.1 °C (96.9 °F)   Resp 16   Ht 1.575 m (5' 2\")   Wt 93 kg (205 lb)   SpO2 95%   BMI 37.49 kg/m²     PHYSICAL EXAMINATION:  General: Pleasant, well-nourished female; alert and oriented, in no acute distress. Sitting comfortably on exam chair.    Eyes:  Pupils equal, round and reactive to light. Non-icteric; conjunctiva clear.   HENT: Normocephalic. Oral mucosa moist.   Neck:  Supple. No lymphadenopathy.  Respiratory:  Lungs are clear to auscultation; no wheezes, rhonchi, or rales. Respirations unlabored, Breath sounds are equal, Symmetrical chest wall expansion.  Cardiovascular:  Regular rate, Regular rhythm. Normal S1S2. No m/r/g.  Gastrointestinal:  Soft, non-tender, non-distended; no palpable masses or organomegaly. Bowel sounds normoactive. No CVA tenderness.  Musculoskeletal:  Grossly normal; appropriate for age.     Integumentary:  Pink, warm, dry, and intact. No rashes or skin discoloration appreciated. Good skin turgor.  Neurologic:  Alert and oriented; grossly intact.    Cognition and Speech:  Oriented, Speech clear and coherent.    Psychiatric:  Cooperative, Appropriate mood & affect.       ------------------------------------  Medical Decision Making  LABORATORY or RADIOLOGICAL IMAGING ORDERS/RESULTS:   Recent " "Results (from the past 24 hour(s))   POCT UA Automated manually resulted    Collection Time: 09/05/24 11:50 AM   Result Value Ref Range    POC Color, Urine Yellow Straw, Yellow, Light-Yellow    POC Appearance, Urine Clear Clear    POC Glucose, Urine >=1000 (4+) (A) NEGATIVE mg/dl    POC Bilirubin, Urine NEGATIVE NEGATIVE    POC Ketones, Urine NEGATIVE NEGATIVE mg/dl    POC Specific Gravity, Urine 1.015 1.005 - 1.035    POC Blood, Urine LARGE (3+) (A) NEGATIVE    POC PH, Urine 6.0 No Reference Range Established PH    POC Protein, Urine 30 (1+) NEGATIVE, 30 (1+) mg/dl    POC Urobilinogen, Urine 0.2 0.2, 1.0 EU/DL    Poc Nitrite, Urine NEGATIVE NEGATIVE    POC Leukocytes, Urine SMALL (1+) (A) NEGATIVE       IMPRESSION/PLAN:  Course: Worsening; stable    1. Dysuria  - POCT UA Automated manually resulted  - Urine Culture  - fosfomycin (Monurol) 3 gram packet; Take 3 g by mouth 1 time for 1 dose. - mix with 3 to 4 oz cool water before ingesting; do not administer in its dry form or mix with hot water. Repeat dose in 2-3 days if symptoms persist.  Dispense: 6 g; Refill: 0    Symptoms/exam consistent with UTI, although reviewed other potential etiologies. No CVA tenderness, is afebrile, and no other red flags on exam. Glucosuria noted but Jardiance likely contributing. Patient will be discharged home with oral antibiotics. As noted at last visit, has multiple medication allergies (PN, sulfa, Macrobid - hives), CKD, and also has history of A. Fib (recently had successful cardioversion) and on amiodarone, so reluctant to use any medications that potentially cause QT prolongation. Will attempt treatment again with Fosfomycin today - discussed single dose treatment regimen in light of her recent antibiotic use - patient requesting \"watch and wait\" 2 dose regimen - agrees to only take 2nd dose if symptoms are persisting 2-3 days after 1st dose. Instructed to begin antibiotic ASAP (reviewed expectations and common side effects of " treatment), and complete full course of medication, even if symptoms resolve more quickly. Also advised to push fluids, rest, and to use appropriate over the counter medications for management of symptoms - cranberry juice/pills may be helpful. Advised to follow-up with primary care provider or ER ASAP if develops fever/chills, body aches, malaise, back/abdominal pain, nausea/vomiting, mental status changes, or if additional concerns/red flags develop. Should also continue regular follow up with PCP re: diabetes, proteinuria, and should consider another consult with Urology in light of her frequent UTIs - agrees to discuss with her PCP. Patient agreed with plan of care; questions were encouraged and answered.     Sending urine for culture to ensure appropriate antibiotic coverage - will contact with results/recommendations.       STACIA Molina-CNP   Advanced Practice Provider  [facility]

## 2024-09-06 LAB — BACTERIA UR CULT: NORMAL

## 2024-09-09 ENCOUNTER — TELEPHONE (OUTPATIENT)
Dept: URGENT CARE | Facility: CLINIC | Age: 79
End: 2024-09-09
Payer: MEDICARE

## 2024-09-09 NOTE — TELEPHONE ENCOUNTER
Result Communication    Resulted Orders   POCT UA Automated manually resulted   Result Value Ref Range    POC Color, Urine Yellow Straw, Yellow, Light-Yellow    POC Appearance, Urine Clear Clear    POC Glucose, Urine >=1000 (4+) (A) NEGATIVE mg/dl    POC Bilirubin, Urine NEGATIVE NEGATIVE    POC Ketones, Urine NEGATIVE NEGATIVE mg/dl    POC Specific Gravity, Urine 1.015 1.005 - 1.035    POC Blood, Urine LARGE (3+) (A) NEGATIVE    POC PH, Urine 6.0 No Reference Range Established PH    POC Protein, Urine 30 (1+) NEGATIVE, 30 (1+) mg/dl    POC Urobilinogen, Urine 0.2 0.2, 1.0 EU/DL    Poc Nitrite, Urine NEGATIVE NEGATIVE    POC Leukocytes, Urine SMALL (1+) (A) NEGATIVE   Urine Culture   Result Value Ref Range    Urine Culture No significant growth        1:50 PM      Results were successfully communicated with the patient and they acknowledged their understanding.    Patient states will continue to take the medication as it is helping the sx

## 2024-09-13 ENCOUNTER — APPOINTMENT (OUTPATIENT)
Dept: PHARMACY | Facility: HOSPITAL | Age: 79
End: 2024-09-13
Payer: MEDICARE

## 2024-09-13 DIAGNOSIS — I50.42 CHRONIC COMBINED SYSTOLIC AND DIASTOLIC HEART FAILURE (MULTI): ICD-10-CM

## 2024-09-13 RX ORDER — ACETAMINOPHEN 500 MG
1 TABLET ORAL DAILY
Qty: 1 KIT | Refills: 0 | Status: SHIPPED | OUTPATIENT
Start: 2024-09-13

## 2024-09-13 NOTE — Clinical Note
Tiffanie Brock, Today's appointment was 3 month follow up regarding anticoagulation and heart failure. Patient is doing well on Eliquis, confirms taking twice daily and denies any missed doses. Reports she has been noticing bruising easier recently, however bruises are always attributed to a known cause and they heal quickly. No unhealing or unexplained bruising or abnormal bleeding per patient.  Regarding heart failure, pt reports no signs/symptoms of worsening disease. Of note, pt states having history of UTIs, and was treated for acute UTI 8/2/24. Pt reports this has been a long term issue & will be establishing with urology, is aware of increased risk of UTI with Jardiance. States these usually occur after an episode of stress or diarrhea. Will continue to monitor to ensure recurrent UTIs do not persist. Pt to start monitoring BP at home as well, as most recent in office readings were elevated. May consider GDMT titration if needed. Will follow up in 1 month.

## 2024-09-13 NOTE — PROGRESS NOTES
Pharmacist Clinic: Cardiology Management    Graciela Norman is a 79 y.o. female was referred to Clinical Pharmacy Team for anticoagulation and heart failure management.     Referring Provider: Chandrika Sanchez, LIAM*. Of note, referral to be adjusted to Dr. Markos Brock, as patient has been following with him regarding anticoagulation and heart failure. Confirmed this change with Dr. Brock.    THIS IS A FOLLOW UP PATIENT APPOINTMENT. AT LAST VISIT ON 6/14/2024 WITH PHARMACIST (Tracee Berumen).    Appointment was completed by the patient who was reached at .    REVIEW OF PAST APPNT (IF APPLICABLE):   Patient states doing very well on current pharmacotherapy, does not report any adverse effects or signs of worsening heart failure at this time, denies abnormal bruising or bleeding. Discussed regular monitoring of blood pressure to ensure blood pressure is controlled as well as no hypotension. Also discussed regular monitoring of weight to ensure no abnormal fluctuations due to fluid retention.       Allergies   Allergen Reactions    Aspirin Diarrhea    Diphenhydramine Other     Heart races, jittery    Iodinated Contrast Media Unknown    Penicillins Hives    Shellfish Derived Hives    Macrobid [Nitrofurantoin Monohyd/M-Cryst] Hives    Statins-Hmg-Coa Reductase Inhibitors Myalgia     Muscle aches    Sulfa (Sulfonamide Antibiotics) Rash       Past Medical History:   Diagnosis Date    Atrial fibrillation (Multi)     Deficiency of other specified B group vitamins 05/24/2021    Low vitamin B12 level    Diabetes (Multi)     FARTUN on CPAP     Personal history of other malignant neoplasm of large intestine 08/15/2022    History of malignant neoplasm of colon       Current Outpatient Medications on File Prior to Visit   Medication Sig Dispense Refill    acetaminophen (Tylenol) 500 mg tablet Take 2 tablets (1,000 mg) by mouth once daily at bedtime.      amiodarone (Pacerone) 200 mg tablet Take 1 tablet (200 mg) by  "mouth once daily. 90 tablet 3    apixaban (Eliquis) 5 mg tablet Take 1 tablet (5 mg) by mouth 2 times a day. 60 tablet 11    cholecalciferol (Vitamin D-3) 25 MCG (1000 UT) tablet Take 1 tablet (1,000 Units) by mouth once daily at bedtime.      cyanocobalamin (Vitamin B-12) 1,000 mcg tablet Take 1 tablet (1,000 mcg) by mouth once daily.      empagliflozin (Jardiance) 10 mg Take 1 tablet (10 mg) by mouth once daily. 90 tablet 3    losartan (Cozaar) 50 mg tablet TAKE 0.5 TABLETS (25 MG) BY MOUTH ONCE DAILY. 15 tablet 11    metFORMIN (Glucophage) 500 mg tablet TAKE 1 TABLET BY MOUTH EVERY DAY 90 tablet 1    metoprolol tartrate (Lopressor) 25 mg tablet TAKE 1 TABLET BY MOUTH TWICE A  tablet 1    pantoprazole (ProtoNix) 40 mg EC tablet Take 1 tablet (40 mg) by mouth once daily. Do not crush, chew, or split. 30 tablet 0    spironolactone (Aldactone) 25 mg tablet TAKE 1 TABLET BY MOUTH EVERY DAY 90 tablet 0     No current facility-administered medications on file prior to visit.         RELEVANT LAB RESULTS  Lab Results   Component Value Date    BILITOT 0.8 06/13/2024    CALCIUM 9.6 07/29/2024    CO2 27 07/29/2024     07/29/2024    CREATININE 1.23 (H) 07/29/2024    GLUCOSE 115 (H) 07/29/2024    ALKPHOS 81 06/13/2024    K 4.8 07/29/2024    PROT 7.9 06/13/2024     (L) 07/29/2024    AST 18 06/13/2024    ALT 20 06/13/2024    BUN 18 07/29/2024    ANIONGAP 13 07/29/2024    MG 1.81 01/02/2024    PHOS 3.3 01/02/2024    ALBUMIN 4.3 06/13/2024    GFRF 67 08/11/2023     Lab Results   Component Value Date    TRIG 136 08/11/2023    CHOL 225 (H) 08/11/2023    HDL 69.0 08/11/2023     No results found for: \"BMCBC\", \"CBCDIF\"     PHARMACEUTICAL ASSESSMENT    Drug Interactions? No clinically significant drug interactions requiring change in therapy found at the time of this visit.     Medication Documentation Review Audit       Reviewed by YANIRA Molina (Nurse Practitioner) on 09/05/24 at 1117      Medication " Order Taking? Sig Documenting Provider Last Dose Status   acetaminophen (Tylenol) 500 mg tablet [id number] Yes Take 2 tablets (1,000 mg) by mouth once daily at bedtime. Historical Provider, MD Taking Active   amiodarone (Pacerone) 200 mg tablet [id number] Yes Take 1 tablet (200 mg) by mouth once daily. Nilson Woods MD Taking Active   apixaban (Eliquis) 5 mg tablet [id number] Yes Take 1 tablet (5 mg) by mouth 2 times a day. YANIRA Plascencia, JULIANA Taking Active   cholecalciferol (Vitamin D-3) 25 MCG (1000 UT) tablet [id number] Yes Take 1 tablet (1,000 Units) by mouth once daily at bedtime. Historical Provider, MD Taking Active   cyanocobalamin (Vitamin B-12) 1,000 mcg tablet [id number] Yes Take 1 tablet (1,000 mcg) by mouth once daily. Historical Provider, MD Taking Active   empagliflozin (Jardiance) 10 mg [id number] Yes Take 1 tablet (10 mg) by mouth once daily. YANIRA Plascencia, JULIANA Taking Active   losartan (Cozaar) 50 mg tablet [id number] Yes TAKE 0.5 TABLETS (25 MG) BY MOUTH ONCE DAILY. Markos Brock MD Taking Active   metFORMIN (Glucophage) 500 mg tablet [id number] Yes TAKE 1 TABLET BY MOUTH EVERY DAY Becky Gusman MD Taking Active   metoprolol tartrate (Lopressor) 25 mg tablet [id number] Yes TAKE 1 TABLET BY MOUTH TWICE A DAY Becky Gusman MD Taking Active   pantoprazole (ProtoNix) 40 mg EC tablet [id number]  Take 1 tablet (40 mg) by mouth once daily. Do not crush, chew, or split. YANIRA Crow   24 [id number]   spironolactone (Aldactone) 25 mg tablet [id number] Yes TAKE 1 TABLET BY MOUTH EVERY DAY YANIRA Plascencia, JULIANA Taking Active                    DISEASE MANAGEMENT ASSESSMENT:     ANTICOAGULATION ASSESSMENT    The ASCVD Risk score (Irina POMPA, et al., 2019) failed to calculate for the following reasons:    The patient has a prior MI or stroke diagnosis    DIAGNOSIS: prevention of nonvalvular atrial fibrilliation stroke and systemic embolism  - Patient is projected to  be on anticoagulation long term  - [id number] Score: [6] (only included if diagnosis is atrial fibrillation)   Age: [<65 (0)] [65-74 (+1)] [> 75 (+2)]: 2  Sex: [Male/Female (+1)]: 1  CHF history: [No/Yes(+1)]: 1  Hypertension history: [No/Yes(+1)]: 1  Stroke/TIA/thromboembolism history: [No/Yes(+2)]: 0  Vascular disease history (prior MI, peripheral artery disease, aortic plaque): [No/Yes(+1)]: 0  Diabetes history: [No/Yes(+1)]: 1    CURRENT PHARMACOTHERAPY:   Eliquis 5 mg BID  80 y/o  93 kg  Scr 1.23 mg/dL (7/29/2024)    RELEVANT PAST MEDICAL HISTORY:   A-fib, HTN, DLD, CHF, T2DM    Affordability/Accessibility: [facility] active through 3/25/2025  Adherence/Organization: confirms taking twice daily; patient uses medication box for organization.   Adverse Reactions: patient reports some more bruising recently, but this is always attributed to a known cause. Reports these bruises heal quickly. Denies any unhealing bruises or abnormal bleeding.  Recent Hospitalizations: none  Recent Falls/Trauma: none  Changes in Tobacco or Alcohol Intake:   Tobacco: none, does not use  Alcohol: will have an occasional 1-2 glasses of wine about once every few months    CHF ASSESSMENT     Symptom/Staging:  -Most recent ejection fraction: 60%  -NYHA Stage: Unknown    Results for orders placed during the hospital encounter of 05/01/24    Transthoracic Echo (TTE) [place] [address]  Phone [phone], Fax [phone]    TRANSTHORACIC ECHOCARDIOGRAM REPORT      Patient Name:      PABLITO Matta Physician:    [id number] Amor MD  Study Date:        5/1/2024             Ordering Provider:    Steve AMOR  MRN/PID:           [id number]             Fellow:  Accession#:        [id number]         Nurse:  Date of Birth/Age: 1945 / 79 years Sonographer:          Michael Esposito RDCS  Gender:            F                    Additional  Staff:  Height:            157.48 cm            Admit Date:  Weight:            94.35 kg             Admission Status:     Outpatient  BSA / BMI:         1.94 m2 / 38.04      Department Location:  [facility] Echo Lab  kg/m2  Blood Pressure: 159 /68 mmHg    Study Type:    TRANSTHORACIC ECHO (TTE) COMPLETE  Diagnosis/ICD: Unspecified systolic (congestive) heart failure (CHF)-I50.20  CPT Codes:     Echo Complete w Full Doppler-51923  Study Detail: The following Echo studies were performed: 2D, M-Mode, Doppler and  color flow.      PHYSICIAN INTERPRETATION:  Left Ventricle: Left ventricular systolic function is normal, with an estimated ejection fraction of 60%. There are no regional wall motion abnormalities. The left ventricular cavity size is normal. Spectral Doppler shows an impaired relaxation pattern of left ventricular diastolic filling.  Left Atrium: The left atrium is normal in size.  Right Ventricle: The right ventricle is normal in size. There is normal right ventricular global systolic function.  Right Atrium: The right atrium is normal in size.  Aortic Valve: The aortic valve is trileaflet. There is no evidence of aortic valve regurgitation. The peak instantaneous gradient of the aortic valve is 11.8 mmHg. The mean gradient of the aortic valve is 6.0 mmHg.  Mitral Valve: The mitral valve is normal in structure. There is no evidence of mitral valve regurgitation. Calcified mitral annulus.  Tricuspid Valve: The tricuspid valve is structurally normal. There is trace tricuspid regurgitation.  Pulmonic Valve: The pulmonic valve is not well visualized. There is trace pulmonic valve regurgitation.  Pericardium: There is no pericardial effusion noted.  Aorta: The aortic root is normal.      CONCLUSIONS:  1. Left ventricular systolic function is normal with a 60% estimated ejection fraction.  2. Spectral Doppler shows an impaired relaxation pattern of left ventricular diastolic filling.  3. Compared to prior echocardiogram  dated 1/3/2024-ejection fraction appears to have improved. Notably Definity was not used on current echocardiogram.    QUANTITATIVE DATA SUMMARY:  2D MEASUREMENTS:  Normal Ranges:  Ao Root d:     3.10 cm   (2.0-3.7cm)  LAs:           3.90 cm   (2.7-4.0cm)  IVSd:          1.15 cm   (0.6-1.1cm)  LVPWd:         0.86 cm   (0.6-1.1cm)  LVIDd:         3.74 cm   (3.9-5.9cm)  LVIDs:         3.06 cm  LV Mass Index: 63.1 g/m2  LV % FS        18.1 %    LA VOLUME:  Normal Ranges:  LA Vol A4C:        23.6 ml    (22+/-6mL/m2)  LA Vol A2C:        33.3 ml  LA Vol BP:         29.7 ml  LA Vol Index A4C:  12.2ml/m2  LA Vol Index A2C:  17.1 ml/m2  LA Vol Index BP:   15.3 ml/m2  LA Area A4C:       11.7 cm2  LA Area A2C:       14.7 cm2  LA Major Axis A4C: 4.9 cm  LA Major Axis A2C: 5.5 cm  LA Volume Index:   11.6 ml/m2  LA Vol A4C:        22.5 ml  LA Vol A2C:        33.5 ml    LV SYSTOLIC FUNCTION BY 2D PLANIMETRY (MOD):  Normal Ranges:  EF-A4C View: 56.8 % (>=55%)  EF-A2C View: 63.5 %  EF-Biplane:  60.6 %    LV DIASTOLIC FUNCTION:  Normal Ranges:  MV Peak E:    0.65 m/s (0.7-1.2 m/s)  MV Peak A:    1.03 m/s (0.42-0.7 m/s)  E/A Ratio:    0.63     (1.0-2.2)  MV lateral e' 0.16 m/s  MV medial e'  0.10 m/s    MITRAL VALVE:  Normal Ranges:  MV DT: 296 msec (150-240msec)    AORTIC VALVE:  Normal Ranges:  AoV Vmax:                1.72 m/s  (<=1.7m/s)  AoV Peak P.8 mmHg (<20mmHg)  AoV Mean P.0 mmHg  (1.7-11.5mmHg)  LVOT Max Cade:            1.27 m/s  (<=1.1m/s)  AoV VTI:                 31.10 cm  (18-25cm)  LVOT VTI:                22.80 cm  LVOT Diameter:           1.80 cm   (1.8-2.4cm)  AoV Area, VTI:           1.87 cm2  (2.5-5.5cm2)  AoV Area,Vmax:           1.88 cm2  (2.5-4.5cm2)  AoV Dimensionless Index: 0.73      RIGHT VENTRICLE:  RV Basal 4.06 cm  RV Mid   2.83 cm  RV Major 8.0 cm  RV s'    0.17 m/s      [id number] Markos Brock MD  Electronically signed on 2024 at 3:29:50 PM        ** Final  **      Guideline-Directed Medical Therapy:  -ARNI: No   -If no, then ACEi/ARB?: Yes, describe: losartan 50 mg 0.5 tablets (25 mg) every day   -Beta Blocker: Yes, describe: Metoprolol tartrate 25 mg BID  -MRA: Yes, describe: Spironolactone 25 mg every day   -SGLT2i: Yes, describe: Jardiance 10 mg every day     Secondary Prevention:  -The ASCVD Risk score (Irina DK, et al., 2019) failed to calculate for the following reasons:    The patient has a prior MI or stroke diagnosis   -Aspirin 81mg? no  -Statin?: No  -HTN?: Yes, describe: 148/8 as of 9/5/2024    CURRENT PHARMACOTHERAPY:   Jardiance 10 mg every day   Losartan 50 mg 0.5 tablets (25 mg) every day   Metoprolol tartrate 25 mg BID  Spironolactone 25 mg every day     Affordability: [facility] - active through 3/25/2025  Adherence/Compliance: confirms taking medications as prescribed; denies any missed doses and uses a medication box for organization  Adverse Effects: patient has had 2 urinary tract infections since August (8/2/2024, 9/5/2024) reports she went to Urgent Care regarding these. Urine culture for second visit did not show any bacterial growth. Prior to this had not had one in about 1 year. Patient reports this has been a long term issue and she will be establishing with urology regarding this, is aware of increased risk of UTI with Jardiance. States these usually occur after an episode of stress or diarrhea.    Monitoring Weights at Home: No, will weigh self on occasion but not consistently  Home Weight Recordings: 205 lbs (no acute fluctuations)    Past In Office Weight Readings:   Wt Readings from Last 6 Encounters:   09/05/24 93 kg (205 lb)   08/15/24 94.2 kg (207 lb 11.2 oz)   08/05/24 94.6 kg (208 lb 8.9 oz)   08/02/24 95.3 kg (210 lb)   06/25/24 94.3 kg (208 lb)   06/18/24 96.2 kg (211 lb 15.6 oz)       Monitoring Blood Pressure at Home: No  Home BP Recordings: None    Past In Office BP Readings:   BP Readings from Last 6 Encounters:   09/05/24  148/86   08/15/24 148/74   08/05/24 127/61   08/02/24 (!) 169/100   06/25/24 148/82   06/18/24 159/84       HEALTH MANAGEMENT    Maintaining fluid restriction (<2 L/day): N/A  Edema/swelling: No  Shortness of breath: No  Trouble sleeping/lying down: No  Dry/hacking cough: No  Recent Hospitalizations: No    EDUCATION/COUNSELING:   - Counseled patient on MOA, expectations, duration of therapy, contraindications, administration, and monitoring parameters  - Counseled patient on lifestyle modifications that can decrease your risk of having complications (smoking cessation, losing weight, daily weights, vaccines)  - Counseled patient on fluid intake and weight management. Recommended to not consume more than 2 liters of fliuids per day. If they have gained more than 2-3 pounds within a 24 hours period (or 5 pounds in a week), contact their cardiologist  - Answered all patient questions and concerns   - Counseled patient on MOA, expectations, duration of therapy, contraindications, administration, and monitoring parameters  - Counseled patient of side effects that are indicative of bleeding such as dark tarry stool, unexplainable bruising, or vomiting up a coffee ground like substance       DISCUSSION/NOTES:   Today's appointment was 3 month follow up regarding anticoagulation and heart failure pharmacotherapy.  Anticoagulation:  Patient is doing well on Eliquis, confirms taking twice daily and denies any missed doses. Reports she has been noticing bruising easier recently, however these bruises are always attributed to a known cause and patient states they heal quickly. No unhealing or unexplained bruising or abnormal bleeding per patient.  Patient reports no hospitalizations or falls since last pharmacy appointment.  Discussed interaction of Eliquis and alcohol, patient will have 1-2 glasses of wine every few months. Discussed occasional use is permitted, but to monitor for signs/symptoms of bleeding and move carefully  "due to increased risk of bleeding.   Heart Failure:  Overall, patient is doing well on heart failure pharmacotherapy. Reports no signs/symptoms of worsening heart failure.  Of note, patient states having history of UTIs, and was treated for acute UTI 8/2/2024. Presented to urgent care for dysuria again on 9/5 and given antibiotics, however culture was negative. Patient reports this has been a long term issue and she will be establishing with urology regarding this, is aware of increased risk of UTI with Jardiance. States these usually occur after an episode of stress or diarrhea. Discussed monitoring frequency of UTIs and potential for discontinuing Jardiance if recurrent UTI continues.  Patient is not currently monitoring BP at home, recent in office readings have been elevated. Patient to begin monitoring to ensure BP is at goal, may consider titration of GDMT at next visit based on home readings. Will follow up regarding this in 1 month.    Prescription was written for \"Blood Pressure Monitor\"   Prescription was faxed to [facility] @ [phone]  Prescription has patient's date of birth AND contact information   Picture of patient's insurance information was included  Prescription has diagnosis of HTN documented   Prescription has which size cuff the patient would need   Standard: 14 in  Large: 16 in  XL: 21 in     These are digital, batter operated blood pressure cuffs. Once filled, they prefer for patients to go to their local [facility] to  - but they are able to ship if the patient does not have transportation.     Contact Information:   [facility]   Phone: [phone]   Fax: [phone]   Toll Free: [phone]     ASSESSMENT AND PLAN:    Assessment/Plan   Problem List Items Addressed This Visit       Chronic combined systolic and diastolic heart failure (Multi)     No changes to GDMT at today's appointment, patient to monitor home BP as previous " in office readings have been elevated. May consider increase of losartan if needed at future appointment. Will follow up in 1 month.          Relevant Medications    blood pressure monitor kit    Other Relevant Orders    Follow Up In Clinical Pharmacy         RECOMMENDATIONS/PLAN    Continue:  Eliquis 5 mg BID  Jardiance 10 mg every day   Losartan 50 mg 0.5 tablets (25 mg) every day  Metoprolol tartrate 25 mg BID  Spironolactone 25 mg every day   Follow up: 1 month    Last Appnt with Referring Provider (Dr. Brock): 8/15/2025  Next Appnt with Referring Provider (Dr. Brock): 2/13/2025  Clinical Pharmacist follow up: 10/22/2024  VAF/Application Expiration: Yes    Date: 3/25/2025  Type of Encounter: Isai Berumen PharmD    Verbal consent to manage patient's drug therapy was obtained from the patient . They were informed they may decline to participate or withdraw from participation in pharmacy services at any time.    Continue all meds under the continuation of care with the referring provider and clinical pharmacy team.

## 2024-09-13 NOTE — Clinical Note
Tiffanie Cobos, Today's appointment was 3 month follow up regarding anticoagulation and heart failure. Of note, referral will be adjusted to Dr. Brock as he has most recently seen the patient. Patient is doing well on Eliquis, confirms taking twice daily and denies any missed doses. Reports she has been noticing bruising easier recently, however bruises are always attributed to a known cause and they heal quickly. No unhealing or unexplained bruising or abnormal bleeding per patient.  Regarding heart failure, pt reports no signs/symptoms of worsening disease. Of note, pt states having history of UTIs, and was treated for acute UTI 8/2/24. Pt reports this has been a long term issue & will be establishing with urology, is aware of increased risk of UTI with Jardiance. States these usually occur after an episode of stress or diarrhea. Pt to start monitoring BP at home as well, as most recent in office readings were elevated. May consider GDMT titration if needed. Will follow up in 1 month.

## 2024-09-13 NOTE — ASSESSMENT & PLAN NOTE
No changes to GDMT at today's appointment, patient to monitor home BP as previous in office readings have been elevated. May consider increase of losartan if needed at future appointment. Will follow up in 1 month.

## 2024-09-24 ENCOUNTER — APPOINTMENT (OUTPATIENT)
Dept: CARDIOLOGY | Facility: CLINIC | Age: 79
End: 2024-09-24
Payer: MEDICARE

## 2024-09-24 VITALS
BODY MASS INDEX: 38.72 KG/M2 | DIASTOLIC BLOOD PRESSURE: 74 MMHG | WEIGHT: 210.4 LBS | HEIGHT: 62 IN | SYSTOLIC BLOOD PRESSURE: 132 MMHG | OXYGEN SATURATION: 96 % | HEART RATE: 68 BPM

## 2024-09-24 DIAGNOSIS — Z98.890 S/P ABLATION OPERATION FOR ARRHYTHMIA: Primary | ICD-10-CM

## 2024-09-24 DIAGNOSIS — I10 BENIGN HYPERTENSION: ICD-10-CM

## 2024-09-24 DIAGNOSIS — I48.19 PERSISTENT ATRIAL FIBRILLATION (MULTI): ICD-10-CM

## 2024-09-24 DIAGNOSIS — I48.91 ATRIAL FIBRILLATION WITH RVR (MULTI): ICD-10-CM

## 2024-09-24 DIAGNOSIS — Z86.79 S/P ABLATION OPERATION FOR ARRHYTHMIA: Primary | ICD-10-CM

## 2024-09-24 PROCEDURE — 1036F TOBACCO NON-USER: CPT | Performed by: STUDENT IN AN ORGANIZED HEALTH CARE EDUCATION/TRAINING PROGRAM

## 2024-09-24 PROCEDURE — 93000 ELECTROCARDIOGRAM COMPLETE: CPT | Performed by: STUDENT IN AN ORGANIZED HEALTH CARE EDUCATION/TRAINING PROGRAM

## 2024-09-24 PROCEDURE — 3078F DIAST BP <80 MM HG: CPT | Performed by: STUDENT IN AN ORGANIZED HEALTH CARE EDUCATION/TRAINING PROGRAM

## 2024-09-24 PROCEDURE — 3075F SYST BP GE 130 - 139MM HG: CPT | Performed by: STUDENT IN AN ORGANIZED HEALTH CARE EDUCATION/TRAINING PROGRAM

## 2024-09-24 PROCEDURE — 1159F MED LIST DOCD IN RCRD: CPT | Performed by: STUDENT IN AN ORGANIZED HEALTH CARE EDUCATION/TRAINING PROGRAM

## 2024-09-24 PROCEDURE — 99214 OFFICE O/P EST MOD 30 MIN: CPT | Performed by: STUDENT IN AN ORGANIZED HEALTH CARE EDUCATION/TRAINING PROGRAM

## 2024-09-24 PROCEDURE — RXMED WILLOW AMBULATORY MEDICATION CHARGE

## 2024-09-24 RX ORDER — AMIODARONE HYDROCHLORIDE 200 MG/1
200 TABLET ORAL DAILY
Qty: 21 TABLET | Refills: 0 | Status: SHIPPED | OUTPATIENT
Start: 2024-09-24 | End: 2024-10-15

## 2024-09-24 NOTE — PROGRESS NOTES
"    Cardiac Electrophysiology Office Visit     Referred by Nilson Wall MD for   Chief Complaint   Patient presents with    3 month f/u     Ablation     HPI:  Graciela Norman is a 79 y.o. year old female patient with h/o Colon CA s/p Surgery Colon CA resection/Chemo (2016), HTN, HLD, DM (not on insulin), Obesity, Atrial fibrillation presenting today for follow up    Objective  Current Outpatient Medications   Medication Instructions    acetaminophen (TYLENOL) 1,000 mg, oral, Nightly PRN    amiodarone (PACERONE) 200 mg, oral, Daily    blood pressure monitor kit 1 each, miscellaneous, Daily    cholecalciferol (VITAMIN D-3) 1,000 Units, oral, Nightly    cyanocobalamin (Vitamin B-12) 1,000 mcg tablet 1 tablet, oral, Daily    Eliquis 5 mg, oral, 2 times daily    Jardiance 10 mg, oral, Daily    losartan (COZAAR) 25 mg, oral, Daily    metFORMIN (GLUCOPHAGE) 500 mg, oral, Daily    metoprolol tartrate (LOPRESSOR) 25 mg, oral, 2 times daily    spironolactone (ALDACTONE) 25 mg, oral, Daily         Visit Vitals  /74   Pulse 68   Ht 1.575 m (5' 2\")   Wt 95.4 kg (210 lb 6.4 oz)   SpO2 96%   BMI 38.48 kg/m²   OB Status Postmenopausal   Smoking Status Former   BSA 2.04 m²      Physical Exam  Vitals reviewed.   Constitutional:       Appearance: Normal appearance.   HENT:      Head: Normocephalic and atraumatic.   Eyes:      Pupils: Pupils are equal, round, and reactive to light.   Cardiovascular:      Rate and Rhythm: Normal rate and regular rhythm.      Pulses: Normal pulses.      Heart sounds: Normal heart sounds. No murmur heard.  Pulmonary:      Effort: Pulmonary effort is normal. No respiratory distress.      Breath sounds: Normal breath sounds. No wheezing.   Abdominal:      Tenderness: There is no abdominal tenderness.   Musculoskeletal:      Comments: Bilateral groin exams without any signs of ecchymosis, hematoma or bruit.   Skin:     General: Skin is warm and dry.   Neurological:      General: No focal " deficit present.      Mental Status: She is alert.        My Interpretation of Reviewed Study(s):  Echo (Jan 2024): Reduced LV function with an EF of 35% with global hypokinesis.  Severely dilated left atrium moderate right atrium.  Moderate MR.  No pericardial effusion noted.  Nuclear stress test (January 2024): No definite evidence of ischemia or prior infarct.  Echo (May 2024): Normal left ventricular systolic function with an EF of 60% no regional wall motion abnormalities.  Normal biatrial size.  No pericardial effusion  [id number] Score  Age >= 75: 2   Sex Female: 1   CHF History Yes: 1   HTN Yes: 1   Stroke/TIA/Thromboembolism No: 0   Vascular Dz: CAD/PAD/Aortic Plaque No: 0   DM Yes: 1   Total Score 6     Assessment/Plan   #Persistent atrial fibrillation s/p RFA (PVI Aug 2024)  #Amiodarone Monitoring  AF Dx History: Seen on ECG since 2021; h/o Cardioversion: No  AAD Use: Amiodarone 200mg (current); Anticoagulation use: Apixaban 5mg BID (current); h/o Ablation: None; [id number] Score: 6.  Patient underwent cardioversion on February 2024.  c/w AC: Apixaban 5mg BID  c/w Metoprolol tart 25mg BID  c/w Amiodarone 200mg Daily - stopped 10/15/24    #High risk medications  High Risk medication for Monitoring for Amiodarone  LFTs WNL from 6/13/24  Planning to stop Amiodarone - 10/15/24    #HFrEF - Etiology unknown - now recovered  Likely related to tachycardia from atrial fibrillation.  No indication for ICD    Return to Clinic: Patient should return to the EP Clinic in 6 months    Nilson Woods MD [facility]  Cardiac Electrophysiology  [email or web address]    **Disclaimer: This note was dictated by speech recognition, and every effort has been made to prevent any error in transcription, however minor errors may be present**

## 2024-09-26 ENCOUNTER — PHARMACY VISIT (OUTPATIENT)
Dept: PHARMACY | Facility: CLINIC | Age: 79
End: 2024-09-26
Payer: COMMERCIAL

## 2024-09-28 DIAGNOSIS — I48.0 PAROXYSMAL ATRIAL FIBRILLATION (MULTI): ICD-10-CM

## 2024-09-30 RX ORDER — METOPROLOL TARTRATE 25 MG/1
25 TABLET, FILM COATED ORAL 2 TIMES DAILY
Qty: 180 TABLET | Refills: 1 | Status: SHIPPED | OUTPATIENT
Start: 2024-09-30

## 2024-10-21 NOTE — PROGRESS NOTES
Pharmacist Clinic: Cardiology Management    Graciela Nomran is a 79 y.o. female was referred to Clinical Pharmacy Team for anticoagulation and heart failure management.     Referring Provider: Markos Brock MD.     THIS IS A FOLLOW UP PATIENT APPOINTMENT. AT LAST VISIT ON 9/13/2024 WITH PHARMACIST (Tracee Berumen).    Appointment was completed by the patient who was reached at .     REVIEW OF PAST APPNT (IF APPLICABLE):   Today's appointment was 3 month follow up regarding anticoagulation and heart failure pharmacotherapy.  Anticoagulation:  Patient is doing well on Eliquis, confirms taking twice daily and denies any missed doses. Reports she has been noticing bruising easier recently, however these bruises are always attributed to a known cause and patient states they heal quickly. No unhealing or unexplained bruising or abnormal bleeding per patient.  Patient reports no hospitalizations or falls since last pharmacy appointment.  Discussed interaction of Eliquis and alcohol, patient will have 1-2 glasses of wine every few months. Discussed occasional use is permitted, but to monitor for signs/symptoms of bleeding and move carefully due to increased risk of bleeding.   Heart Failure:  Overall, patient is doing well on heart failure pharmacotherapy. Reports no signs/symptoms of worsening heart failure.  Of note, patient states having history of UTIs, and was treated for acute UTI 8/2/2024. Presented to urgent care for dysuria again on 9/5 and given antibiotics, however culture was negative. Patient reports this has been a long term issue and she will be establishing with urology regarding this, is aware of increased risk of UTI with Jardiance. States these usually occur after an episode of stress or diarrhea. Discussed monitoring frequency of UTIs and potential for discontinuing Jardiance if recurrent UTI continues.  Patient is not currently monitoring BP at home, recent in office readings have been  elevated. Patient to begin monitoring to ensure BP is at goal, may consider titration of GDMT at next visit based on home readings. Will follow up regarding this in 1 month.      Allergies   Allergen Reactions    Aspirin Diarrhea    Diphenhydramine Other     Heart races, jittery    Iodinated Contrast Media Unknown    Penicillins Hives    Shellfish Derived Hives    Macrobid [Nitrofurantoin Monohyd/M-Cryst] Hives    Statins-Hmg-Coa Reductase Inhibitors Myalgia     Muscle aches    Sulfa (Sulfonamide Antibiotics) Rash       Past Medical History:   Diagnosis Date    Atrial fibrillation (Multi)     Deficiency of other specified B group vitamins 05/24/2021    Low vitamin B12 level    Diabetes (Multi)     FARTUN on CPAP     Personal history of other malignant neoplasm of large intestine 08/15/2022    History of malignant neoplasm of colon       Current Outpatient Medications on File Prior to Visit   Medication Sig Dispense Refill    acetaminophen (Tylenol) 500 mg tablet Take 2 tablets (1,000 mg) by mouth as needed at bedtime.      amiodarone (Pacerone) 200 mg tablet Take 1 tablet (200 mg) by mouth once daily for 21 days. 21 tablet 0    apixaban (Eliquis) 5 mg tablet Take 1 tablet (5 mg) by mouth 2 times a day. 60 tablet 11    blood pressure monitor kit 1 each once daily. 1 kit 0    cholecalciferol (Vitamin D-3) 25 MCG (1000 UT) tablet Take 1 tablet (1,000 Units) by mouth once daily at bedtime.      cyanocobalamin (Vitamin B-12) 1,000 mcg tablet Take 1 tablet (1,000 mcg) by mouth once daily.      empagliflozin (Jardiance) 10 mg Take 1 tablet (10 mg) by mouth once daily. 90 tablet 3    losartan (Cozaar) 50 mg tablet TAKE 0.5 TABLETS (25 MG) BY MOUTH ONCE DAILY. 15 tablet 11    metFORMIN (Glucophage) 500 mg tablet TAKE 1 TABLET BY MOUTH EVERY DAY 90 tablet 1    metoprolol tartrate (Lopressor) 25 mg tablet TAKE 1 TABLET BY MOUTH TWICE A  tablet 1    spironolactone (Aldactone) 25 mg tablet TAKE 1 TABLET BY MOUTH EVERY DAY 90  "tablet 0     No current facility-administered medications on file prior to visit.         RELEVANT LAB RESULTS  Lab Results   Component Value Date    BILITOT 0.8 06/13/2024    CALCIUM 9.6 07/29/2024    CO2 27 07/29/2024     07/29/2024    CREATININE 1.23 (H) 07/29/2024    GLUCOSE 115 (H) 07/29/2024    ALKPHOS 81 06/13/2024    K 4.8 07/29/2024    PROT 7.9 06/13/2024     (L) 07/29/2024    AST 18 06/13/2024    ALT 20 06/13/2024    BUN 18 07/29/2024    ANIONGAP 13 07/29/2024    MG 1.81 01/02/2024    PHOS 3.3 01/02/2024    ALBUMIN 4.3 06/13/2024    GFRF 67 08/11/2023     Lab Results   Component Value Date    TRIG 136 08/11/2023    CHOL 225 (H) 08/11/2023    HDL 69.0 08/11/2023     No results found for: \"BMCBC\", \"CBCDIF\"     PHARMACEUTICAL ASSESSMENT    Drug Interactions? No clinically significant drug interactions requiring change in therapy found at the time of this visit.     Medication Documentation Review Audit       Reviewed by Sarah E Markley, LPN (Licensed Nurse) on 09/24/24 at 1424      Medication Order Taking? Sig Documenting Provider Last Dose Status   acetaminophen (Tylenol) 500 mg tablet [id number] Yes Take 2 tablets (1,000 mg) by mouth as needed at bedtime. Historical Provider, MD Taking Differently Active   amiodarone (Pacerone) 200 mg tablet [id number] Yes Take 1 tablet (200 mg) by mouth once daily. Nilson Woods MD Taking Active   apixaban (Eliquis) 5 mg tablet [id number] Yes Take 1 tablet (5 mg) by mouth 2 times a day. Chandrika Sanchez, APRN-CNP, DNP Taking Active   blood pressure monitor kit [id number] Yes 1 each once daily. Markos Brock MD Taking Active   cholecalciferol (Vitamin D-3) 25 MCG (1000 UT) tablet [id number] Yes Take 1 tablet (1,000 Units) by mouth once daily at bedtime. Historical Provider, MD Taking Active   cyanocobalamin (Vitamin B-12) 1,000 mcg tablet [id number] Yes Take 1 tablet (1,000 mcg) by mouth once daily. Historical Provider, MD Taking Active   empagliflozin (Jardiance) 10 " mg [id number] Yes Take 1 tablet (10 mg) by mouth once daily. YANIRA Plascencia, DNP Taking Active   losartan (Cozaar) 50 mg tablet [id number] Yes TAKE 0.5 TABLETS (25 MG) BY MOUTH ONCE DAILY. Markos Brock MD Taking Active   metFORMIN (Glucophage) 500 mg tablet [id number] Yes TAKE 1 TABLET BY MOUTH EVERY DAY Becky Gusman MD Taking Active   metoprolol tartrate (Lopressor) 25 mg tablet [id number] Yes TAKE 1 TABLET BY MOUTH TWICE A DAY Becky Gusman MD Taking Active   pantoprazole (ProtoNix) 40 mg EC tablet [id number] No Take 1 tablet (40 mg) by mouth once daily. Do not crush, chew, or split.   Patient not taking: Reported on 9/24/2024    YANIRA Crow Not Taking Active   spironolactone (Aldactone) 25 mg tablet [id number] Yes TAKE 1 TABLET BY MOUTH EVERY DAY YANIRA Plascencia, DNP Taking Active                    DISEASE MANAGEMENT ASSESSMENT:     ANTICOAGULATION ASSESSMENT    The ASCVD Risk score (Irina POMPA, et al., 2019) failed to calculate for the following reasons:    Risk score cannot be calculated because patient has a medical history suggesting prior/existing ASCVD    DIAGNOSIS: prevention of nonvalvular atrial fibrilliation stroke and systemic embolism  - Patient is projected to be on anticoagulation long term  - [id number] Score: [6] (only included if diagnosis is atrial fibrillation)   Age: [<65 (0)] [65-74 (+1)] [> 75 (+2)]: 2  Sex: [Male/Female (+1)]: 1  CHF history: [No/Yes(+1)]: 1  Hypertension history: [No/Yes(+1)]: 1  Stroke/TIA/thromboembolism history: [No/Yes(+2)]: 0  Vascular disease history (prior MI, peripheral artery disease, aortic plaque): [No/Yes(+1)]: 0  Diabetes history: [No/Yes(+1)]: 1    CURRENT PHARMACOTHERAPY:   Eliquis 5 mg BID  80 y/o  95.4 kg  Scr 1.23 mg/dL (7/29/2024)    RELEVANT PAST MEDICAL HISTORY:   A-fib, HTN, DLD, CHF, T2DM    Affordability/Accessibility:  PAP active through 3/25/2025  Adherence/Organization: confirms taking twice daily (~7-9AM  and 7-9PM); patient uses medication box for organization. Denies any missed doses  Adverse Reactions: no abnormal bruising or bleeding per pt  Recent Hospitalizations: none   Recent Falls/Trauma: none   Changes in Tobacco or Alcohol Intake:   Tobacco: none, does not use   Alcohol: will have an occasional 1-2 glasses of wine about once every few months     CHF ASSESSMENT     Symptom/Staging:  -Most recent ejection fraction: 60%  -NYHA Stage: Unknown    Results for orders placed during the hospital encounter of 05/01/24    Transthoracic Echo (TTE) Complete    [place] [address]  Phone [phone], Fax [phone]    TRANSTHORACIC ECHOCARDIOGRAM REPORT      Patient Name:      PABLITO Matta Physician:    Steve Amor MD  Study Date:        5/1/2024             Ordering Provider:    Steve AMOR  MRN/PID:           [id number]             Fellow:  Accession#:        [id number]         Nurse:  Date of Birth/Age: 1945 / 79 years Sonographer:          Michael Esposito [facility]  Gender:            F                    Additional Staff:  Height:            157.48 cm            Admit Date:  Weight:            94.35 kg             Admission Status:     Outpatient  BSA / BMI:         1.94 m2 / 38.04      Department Location:  [facility]  kg/m2  Blood Pressure: 159 /68 mmHg    Study Type:    TRANSTHORACIC ECHO (TTE) COMPLETE  Diagnosis/ICD: Unspecified systolic (congestive) heart failure (CHF)-I50.20  CPT Codes:     Echo Complete w Full Doppler-11962  Study Detail: The following Echo studies were performed: 2D, M-Mode, Doppler and  color flow.      PHYSICIAN INTERPRETATION:  Left Ventricle: Left ventricular systolic function is normal, with an estimated ejection fraction of 60%. There are no regional wall motion abnormalities. The left ventricular cavity size is normal. Spectral Doppler shows an impaired relaxation pattern of left  ventricular diastolic filling.  Left Atrium: The left atrium is normal in size.  Right Ventricle: The right ventricle is normal in size. There is normal right ventricular global systolic function.  Right Atrium: The right atrium is normal in size.  Aortic Valve: The aortic valve is trileaflet. There is no evidence of aortic valve regurgitation. The peak instantaneous gradient of the aortic valve is 11.8 mmHg. The mean gradient of the aortic valve is 6.0 mmHg.  Mitral Valve: The mitral valve is normal in structure. There is no evidence of mitral valve regurgitation. Calcified mitral annulus.  Tricuspid Valve: The tricuspid valve is structurally normal. There is trace tricuspid regurgitation.  Pulmonic Valve: The pulmonic valve is not well visualized. There is trace pulmonic valve regurgitation.  Pericardium: There is no pericardial effusion noted.  Aorta: The aortic root is normal.      CONCLUSIONS:  1. Left ventricular systolic function is normal with a 60% estimated ejection fraction.  2. Spectral Doppler shows an impaired relaxation pattern of left ventricular diastolic filling.  3. Compared to prior echocardiogram dated 1/3/2024-ejection fraction appears to have improved. Notably Definity was not used on current echocardiogram.    QUANTITATIVE DATA SUMMARY:  2D MEASUREMENTS:  Normal Ranges:  Ao Root d:     3.10 cm   (2.0-3.7cm)  LAs:           3.90 cm   (2.7-4.0cm)  IVSd:          1.15 cm   (0.6-1.1cm)  LVPWd:         0.86 cm   (0.6-1.1cm)  LVIDd:         3.74 cm   (3.9-5.9cm)  LVIDs:         3.06 cm  LV Mass Index: 63.1 g/m2  LV % FS        18.1 %    LA VOLUME:  Normal Ranges:  LA Vol A4C:        23.6 ml    (22+/-6mL/m2)  LA Vol A2C:        33.3 ml  LA Vol BP:         29.7 ml  LA Vol Index A4C:  12.2ml/m2  LA Vol Index A2C:  17.1 ml/m2  LA Vol Index BP:   15.3 ml/m2  LA Area A4C:       11.7 cm2  LA Area A2C:       14.7 cm2  LA Major Axis A4C: 4.9 cm  LA Major Axis A2C: 5.5 cm  LA Volume Index:   11.6 ml/m2  LA  Vol A4C:        22.5 ml  LA Vol A2C:        33.5 ml    LV SYSTOLIC FUNCTION BY 2D PLANIMETRY (MOD):  Normal Ranges:  EF-A4C View: 56.8 % (>=55%)  EF-A2C View: 63.5 %  EF-Biplane:  60.6 %    LV DIASTOLIC FUNCTION:  Normal Ranges:  MV Peak E:    0.65 m/s (0.7-1.2 m/s)  MV Peak A:    1.03 m/s (0.42-0.7 m/s)  E/A Ratio:    0.63     (1.0-2.2)  MV lateral e' 0.16 m/s  MV medial e'  0.10 m/s    MITRAL VALVE:  Normal Ranges:  MV DT: 296 msec (150-240msec)    AORTIC VALVE:  Normal Ranges:  AoV Vmax:                1.72 m/s  (<=1.7m/s)  AoV Peak P.8 mmHg (<20mmHg)  AoV Mean P.0 mmHg  (1.7-11.5mmHg)  LVOT Max Cade:            1.27 m/s  (<=1.1m/s)  AoV VTI:                 31.10 cm  (18-25cm)  LVOT VTI:                22.80 cm  LVOT Diameter:           1.80 cm   (1.8-2.4cm)  AoV Area, VTI:           1.87 cm2  (2.5-5.5cm2)  AoV Area,Vmax:           1.88 cm2  (2.5-4.5cm2)  AoV Dimensionless Index: 0.73      RIGHT VENTRICLE:  RV Basal 4.06 cm  RV Mid   2.83 cm  RV Major 8.0 cm  RV s'    0.17 m/s      [id number] Markos Brock MD  Electronically signed on 2024 at 3:29:50 PM        ** Final **      Guideline-Directed Medical Therapy:  -ARNI: No   -If no, then ACEi/ARB?: Yes, describe: losartan 50 mg 0.5 tablets (25 mg) every day   -Beta Blocker: Yes, describe: Metoprolol tartrate 25 mg BID  -MRA: Yes, describe: Spironolactone 25 mg every day   -SGLT2i: Yes, describe: Jardiance 10 mg every day     Secondary Prevention:  -The ASCVD Risk score (Irina DK, et al., 2019) failed to calculate for the following reasons:    Risk score cannot be calculated because patient has a medical history suggesting prior/existing ASCVD   -Aspirin 81mg? no  -Statin?: No  -HTN?: Yes, describe: 132/74 as of 2024    CURRENT PHARMACOTHERAPY:   Jardiance 10 mg every day   Losartan 50 mg 0.5 tablets (25 mg) every day   Metoprolol tartrate 25 mg BID  Spironolactone 25 mg every day     Affordability:  PAP - active through  3/25/2025  Adherence/Compliance: confirms taking medications as prescribed; denies any missed doses and uses a medication box for organization   Adverse Effects: reports no further UTIs/urgency since last appointment; no other adverse effects per patient    Monitoring Weights at Home: Yes  Home Weight Recordings: 202 lbs (denies any acute fluctuations)    Past In Office Weight Readings:   Wt Readings from Last 6 Encounters:   09/24/24 95.4 kg (210 lb 6.4 oz)   09/05/24 93 kg (205 lb)   08/15/24 94.2 kg (207 lb 11.2 oz)   08/05/24 94.6 kg (208 lb 8.9 oz)   08/02/24 95.3 kg (210 lb)   06/25/24 94.3 kg (208 lb)       Monitoring Blood Pressure at Home: No, pt did not receive BP monitor after last appointment  Home BP Recordings: Patient reports taking BP every so often at home, averages about 132/74    Past In Office BP Readings:   BP Readings from Last 6 Encounters:   09/24/24 132/74   09/05/24 148/86   08/15/24 148/74   08/05/24 127/61   08/02/24 (!) 169/100   06/25/24 148/82       HEALTH MANAGEMENT    Maintaining fluid restriction (<2 L/day): N/A   Edema/swelling: No  Shortness of breath: No  Trouble sleeping/lying down: No  Dry/hacking cough: No  Recent Hospitalizations: No    EDUCATION/COUNSELING:   - Counseled patient on MOA, expectations, duration of therapy, contraindications, administration, and monitoring parameters  - Counseled patient on lifestyle modifications that can decrease your risk of having complications (smoking cessation, losing weight, daily weights, vaccines)  - Counseled patient on fluid intake and weight management. Recommended to not consume more than 2 liters of fliuids per day. If they have gained more than 2-3 pounds within a 24 hours period (or 5 pounds in a week), contact their cardiologist  - Answered all patient questions and concerns   - Counseled patient on MOA, expectations, duration of therapy, contraindications, administration, and monitoring parameters  - Counseled patient of side  effects that are indicative of bleeding such as dark tarry stool, unexplainable bruising, or vomiting up a coffee ground like substance       DISCUSSION/NOTES:   Today's appointment was 1 month follow up regarding anticoagulation and heart failure pharmacotherapy.  Anticoagulation:  Graciela is doing well on anticoagulation with Eliquis. No abnormal bruising/bleeding per patient. No hospitalizations or falls since last appointment. Reports adherence to twice daily dosing.   Heart Failure:  Graciela is doing well on current GDMT, reports no signs/symptoms of worsening HF at this time. States no additional urinary urgency or UTI since last appointment. She is not consistently monitoring her BP at home, but will monitor periodically. Reports her home BP averages around 132/74. Will not titrate losartan at this time, patient to call pharmacist or cardiology office if BP continuously stays above 130/80. Patient confirms understanding.  Will follow up in 3 months.      ASSESSMENT AND PLAN:    Assessment/Plan   Problem List Items Addressed This Visit       Persistent atrial fibrillation (Multi) - Primary     Graciela is doing well on anticoagulation with Eliquis. No abnormal bruising/bleeding per patient. No dose adjustment needed for age, weight, and renal function. Will continue current dose and follow up in 3 months.         Chronic combined systolic and diastolic heart failure     Graciela is doing well on current GDMT, no signs/symptoms of worsening heart failure reported.  States no additional urinary urgency or UTI since last appointment, will continue Jardiance. She is not consistently monitoring her BP at home, but will monitor periodically. Will not titrate losartan at this time, patient to call pharmacist or cardiology office if BP continuously stays above 130/80 mmHg. Will follow up in 3 months.         Relevant Orders    Referral to Clinical Pharmacy           RECOMMENDATIONS/PLAN    Continue:  Eliquis 5  mg BID  Jardiance 10 mg every day   Losartan 50 mg 0.5 tablets (25 mg) every day   Metoprolol tartrate 25 mg BID  Spironolactone 25 mg every day   Follow up: 3 months    Last Appnt with Referring Provider: 8/15/2025  Next Appnt with Referring Provider: 2/13/2025  Clinical Pharmacist follow up: 1/22/2025  VAF/Application Expiration: Yes    Date: 3/25/2025  Type of Encounter: Isai Berumen PharmD    Verbal consent to manage patient's drug therapy was obtained from the patient . They were informed they may decline to participate or withdraw from participation in pharmacy services at any time.    Continue all meds under the continuation of care with the referring provider and clinical pharmacy team.

## 2024-10-22 ENCOUNTER — APPOINTMENT (OUTPATIENT)
Dept: PHARMACY | Facility: HOSPITAL | Age: 79
End: 2024-10-22
Payer: MEDICARE

## 2024-10-22 DIAGNOSIS — I50.42 CHRONIC COMBINED SYSTOLIC AND DIASTOLIC HEART FAILURE: ICD-10-CM

## 2024-10-22 DIAGNOSIS — I48.19 PERSISTENT ATRIAL FIBRILLATION (MULTI): Primary | ICD-10-CM

## 2024-10-22 NOTE — Clinical Note
Graciela Whaley Dr. is doing well on anticoagulation with Eliquis. No abnormal bruising/bleeding per patient. No hospitalizations or falls since last appointment. She is also doing well on current GDMT, reports no signs/symptoms of worsening HF at this time. States no additional urinary urgency or UTI since last appointment. She is not consistently monitoring her BP at home, but will monitor periodically. Reports her home BP averages around 132/74. Will not titrate losartan at this time, patient to call pharmacist or cardiology office if BP continuously stays above 130/80. Will follow up in 3 months.

## 2024-10-22 NOTE — ASSESSMENT & PLAN NOTE
Graciela is doing well on anticoagulation with Eliquis. No abnormal bruising/bleeding per patient. No dose adjustment needed for age, weight, and renal function. Will continue current dose and follow up in 3 months.

## 2024-10-22 NOTE — ASSESSMENT & PLAN NOTE
Graciela is doing well on current GDMT, no signs/symptoms of worsening heart failure reported.  States no additional urinary urgency or UTI since last appointment, will continue Jardiance. She is not consistently monitoring her BP at home, but will monitor periodically. Will not titrate losartan at this time, patient to call pharmacist or cardiology office if BP continuously stays above 130/80 mmHg. Will follow up in 3 months.

## 2024-10-23 PROCEDURE — RXMED WILLOW AMBULATORY MEDICATION CHARGE

## 2024-10-25 ENCOUNTER — PHARMACY VISIT (OUTPATIENT)
Dept: PHARMACY | Facility: CLINIC | Age: 79
End: 2024-10-25
Payer: COMMERCIAL

## 2024-11-07 ENCOUNTER — LAB (OUTPATIENT)
Dept: LAB | Facility: LAB | Age: 79
End: 2024-11-07
Payer: MEDICARE

## 2024-11-07 DIAGNOSIS — E11.42 TYPE 2 DIABETES MELLITUS WITH DIABETIC POLYNEUROPATHY, WITHOUT LONG-TERM CURRENT USE OF INSULIN: ICD-10-CM

## 2024-11-07 DIAGNOSIS — Z11.59 NEED FOR HEPATITIS C SCREENING TEST: ICD-10-CM

## 2024-11-07 LAB
ALBUMIN SERPL BCP-MCNC: 4.2 G/DL (ref 3.4–5)
ALP SERPL-CCNC: 74 U/L (ref 33–136)
ALT SERPL W P-5'-P-CCNC: 19 U/L (ref 7–45)
ANION GAP SERPL CALC-SCNC: 12 MMOL/L (ref 10–20)
AST SERPL W P-5'-P-CCNC: 16 U/L (ref 9–39)
BILIRUB SERPL-MCNC: 1.3 MG/DL (ref 0–1.2)
BUN SERPL-MCNC: 19 MG/DL (ref 6–23)
CALCIUM SERPL-MCNC: 10 MG/DL (ref 8.6–10.3)
CHLORIDE SERPL-SCNC: 101 MMOL/L (ref 98–107)
CO2 SERPL-SCNC: 30 MMOL/L (ref 21–32)
CREAT SERPL-MCNC: 1.2 MG/DL (ref 0.5–1.05)
EGFRCR SERPLBLD CKD-EPI 2021: 47 ML/MIN/1.73M*2
GLUCOSE SERPL-MCNC: 127 MG/DL (ref 74–99)
POTASSIUM SERPL-SCNC: 4.9 MMOL/L (ref 3.5–5.3)
PROT SERPL-MCNC: 7 G/DL (ref 6.4–8.2)
SODIUM SERPL-SCNC: 138 MMOL/L (ref 136–145)

## 2024-11-07 PROCEDURE — G0472 HEP C SCREEN HIGH RISK/OTHER: HCPCS

## 2024-11-07 PROCEDURE — 83036 HEMOGLOBIN GLYCOSYLATED A1C: CPT

## 2024-11-07 PROCEDURE — 80053 COMPREHEN METABOLIC PANEL: CPT

## 2024-11-08 LAB
EST. AVERAGE GLUCOSE BLD GHB EST-MCNC: 143 MG/DL
HBA1C MFR BLD: 6.6 %
HCV AB SER QL: NONREACTIVE

## 2024-11-14 ENCOUNTER — APPOINTMENT (OUTPATIENT)
Dept: PRIMARY CARE | Facility: CLINIC | Age: 79
End: 2024-11-14
Payer: MEDICARE

## 2024-11-14 VITALS
BODY MASS INDEX: 38.83 KG/M2 | SYSTOLIC BLOOD PRESSURE: 134 MMHG | HEART RATE: 56 BPM | DIASTOLIC BLOOD PRESSURE: 82 MMHG | WEIGHT: 211 LBS | HEIGHT: 62 IN

## 2024-11-14 DIAGNOSIS — Z23 NEED FOR INFLUENZA VACCINATION: Primary | ICD-10-CM

## 2024-11-14 DIAGNOSIS — Z23 NEED FOR PNEUMOCOCCAL VACCINATION: ICD-10-CM

## 2024-11-14 DIAGNOSIS — I48.19 PERSISTENT ATRIAL FIBRILLATION (MULTI): ICD-10-CM

## 2024-11-14 DIAGNOSIS — I10 BENIGN HYPERTENSION: ICD-10-CM

## 2024-11-14 DIAGNOSIS — G62.2 POLYNEUROPATHY DUE TO OTHER TOXIC AGENTS (MULTI): ICD-10-CM

## 2024-11-14 DIAGNOSIS — E11.42 TYPE 2 DIABETES MELLITUS WITH DIABETIC POLYNEUROPATHY, WITHOUT LONG-TERM CURRENT USE OF INSULIN: ICD-10-CM

## 2024-11-14 PROCEDURE — G0009 ADMIN PNEUMOCOCCAL VACCINE: HCPCS | Performed by: FAMILY MEDICINE

## 2024-11-14 PROCEDURE — 1159F MED LIST DOCD IN RCRD: CPT | Performed by: FAMILY MEDICINE

## 2024-11-14 PROCEDURE — G0008 ADMIN INFLUENZA VIRUS VAC: HCPCS | Performed by: FAMILY MEDICINE

## 2024-11-14 PROCEDURE — 1036F TOBACCO NON-USER: CPT | Performed by: FAMILY MEDICINE

## 2024-11-14 PROCEDURE — 1160F RVW MEDS BY RX/DR IN RCRD: CPT | Performed by: FAMILY MEDICINE

## 2024-11-14 PROCEDURE — 90677 PCV20 VACCINE IM: CPT | Performed by: FAMILY MEDICINE

## 2024-11-14 PROCEDURE — 3075F SYST BP GE 130 - 139MM HG: CPT | Performed by: FAMILY MEDICINE

## 2024-11-14 PROCEDURE — 90673 RIV3 VACCINE NO PRESERV IM: CPT | Performed by: FAMILY MEDICINE

## 2024-11-14 PROCEDURE — 99214 OFFICE O/P EST MOD 30 MIN: CPT | Performed by: FAMILY MEDICINE

## 2024-11-14 PROCEDURE — 3079F DIAST BP 80-89 MM HG: CPT | Performed by: FAMILY MEDICINE

## 2024-11-14 PROCEDURE — G2211 COMPLEX E/M VISIT ADD ON: HCPCS | Performed by: FAMILY MEDICINE

## 2024-11-14 NOTE — PROGRESS NOTES
Patient presents for periodic surveillance of chronic medical problems.     Subjective  Graciela Norman is a 79 y.o. female who presents for Annual Exam.  HPI  DM well controlled A1c 6.6  Htn, stable  Decreased gfr, new meds since last year when gfr was  80, seems stable now will monitor  Hyperlipidemia on statin  A fib, sees cardiology  Agrees to influenza and prevnar 20 vaccines.  Review of Systems   All other systems reviewed and are negative.  .    Allergies   Allergen Reactions    Aspirin Diarrhea    Diphenhydramine Other     Heart races, jittery    Iodinated Contrast Media Unknown    Penicillins Hives    Shellfish Derived Hives    Macrobid [Nitrofurantoin Monohyd/M-Cryst] Hives    Statins-Hmg-Coa Reductase Inhibitors Myalgia     Muscle aches    Sulfa (Sulfonamide Antibiotics) Rash       Current Outpatient Medications on File Prior to Visit   Medication Sig Dispense Refill    acetaminophen (Tylenol) 500 mg tablet Take 2 tablets (1,000 mg) by mouth as needed at bedtime.      apixaban (Eliquis) 5 mg tablet Take 1 tablet (5 mg) by mouth 2 times a day. 60 tablet 11    blood pressure monitor kit 1 each once daily. 1 kit 0    cholecalciferol (Vitamin D-3) 25 MCG (1000 UT) tablet Take 1 tablet (1,000 Units) by mouth once daily at bedtime.      cyanocobalamin (Vitamin B-12) 1,000 mcg tablet Take 1 tablet (1,000 mcg) by mouth once daily.      empagliflozin (Jardiance) 10 mg Take 1 tablet (10 mg) by mouth once daily. 90 tablet 3    losartan (Cozaar) 50 mg tablet TAKE 0.5 TABLETS (25 MG) BY MOUTH ONCE DAILY. 15 tablet 11    metFORMIN (Glucophage) 500 mg tablet TAKE 1 TABLET BY MOUTH EVERY DAY 90 tablet 1    metoprolol tartrate (Lopressor) 25 mg tablet TAKE 1 TABLET BY MOUTH TWICE A  tablet 1    spironolactone (Aldactone) 25 mg tablet TAKE 1 TABLET BY MOUTH EVERY DAY 90 tablet 0    [DISCONTINUED] amiodarone (Pacerone) 200 mg tablet Take 1 tablet (200 mg) by mouth once daily for 21 days. 21 tablet 0     No current  facility-administered medications on file prior to visit.       Patient Active Problem List   Diagnosis    Peripheral neuropathy    Type 2 diabetes mellitus    Persistent atrial fibrillation (Multi)    Benign hypertension    Dyslipidemia    Low vitamin D level    Macular puckering of retina    Recurrent UTI    Severe sleep apnea    Stress incontinence in female    Noncompliance with treatment    Chronic combined systolic and diastolic heart failure    Allergy to statin medication    Malignant neoplasm of ascending colon (Multi)    S/P ablation operation for arrhythmia       Objective     Visit Vitals  /82 (BP Location: Left arm, Patient Position: Sitting)   Pulse 56      Physical Exam  Vitals and nursing note reviewed.   Constitutional:       General: She is not in acute distress.     Appearance: Normal appearance. She is not toxic-appearing.   HENT:      Head: Normocephalic and atraumatic.   Cardiovascular:      Rate and Rhythm: Normal rate and regular rhythm.      Heart sounds: No murmur heard.  Pulmonary:      Effort: Pulmonary effort is normal.      Breath sounds: Normal breath sounds.   Musculoskeletal:      Cervical back: Neck supple. No rigidity.      Comments:     Skin:     General: Skin is warm and dry.   Neurological:      General: No focal deficit present.      Mental Status: She is alert and oriented to person, place, and time.   Psychiatric:         Mood and Affect: Mood normal.         Behavior: Behavior normal.       Lab on 11/07/2024   Component Date Value Ref Range Status    Hepatitis C AB 11/07/2024 Nonreactive  Nonreactive Final    Results from patients taking biotin supplements or receiving high-dose biotin therapy should be interpreted with caution due to possible interference with this test. Providers may contact their local laboratory for further information.    Glucose 11/07/2024 127 (H)  74 - 99 mg/dL Final    Sodium 11/07/2024 138  136 - 145 mmol/L Final    Potassium 11/07/2024 4.9   3.5 - 5.3 mmol/L Final    Chloride 11/07/2024 101  98 - 107 mmol/L Final    Bicarbonate 11/07/2024 30  21 - 32 mmol/L Final    Anion Gap 11/07/2024 12  10 - 20 mmol/L Final    Urea Nitrogen 11/07/2024 19  6 - 23 mg/dL Final    Creatinine 11/07/2024 1.20 (H)  0.50 - 1.05 mg/dL Final    eGFR 11/07/2024 47 (L)  >60 mL/min/1.73m*2 Final    Calculations of estimated GFR are performed using the 2021 CKD-EPI Study Refit equation without the race variable for the IDMS-Traceable creatinine methods.  https://jasn.asnjournals.org/content/early/2021/09/22/ASN.3464089110    Calcium 11/07/2024 10.0  8.6 - 10.3 mg/dL Final    Albumin 11/07/2024 4.2  3.4 - 5.0 g/dL Final    Alkaline Phosphatase 11/07/2024 74  33 - 136 U/L Final    Total Protein 11/07/2024 7.0  6.4 - 8.2 g/dL Final    AST 11/07/2024 16  9 - 39 U/L Final    Bilirubin, Total 11/07/2024 1.3 (H)  0.0 - 1.2 mg/dL Final    ALT 11/07/2024 19  7 - 45 U/L Final    Patients treated with Sulfasalazine may generate falsely decreased results for ALT.    Hemoglobin A1C 11/07/2024 6.6 (H)  See comment % Final    Estimated Average Glucose 11/07/2024 143  Not Established mg/dL Final         Assessment/Plan   Problem List Items Addressed This Visit       Peripheral neuropathy    Type 2 diabetes mellitus    Relevant Orders    CBC and Auto Differential    Comprehensive Metabolic Panel    Lipid Panel    TSH with reflex to Free T4 if abnormal    Vitamin B12    Hemoglobin A1C    Albumin-Creatinine Ratio, Urine Random    Microscopic Only, Urine    Persistent atrial fibrillation (Multi)    Benign hypertension     Other Visit Diagnoses       Need for influenza vaccination    -  Primary    Relevant Orders    Flu vaccine, trivalent, preservative free, no egg protein, age 18y+ (Flublok)    Need for pneumococcal vaccination        Relevant Orders    Pneumococcal conjugate vaccine, 20-valent (PREVNAR 20)          Continue current care, follow up six months, labs prior.          Becky Gusman MD

## 2024-11-19 PROCEDURE — RXMED WILLOW AMBULATORY MEDICATION CHARGE

## 2024-11-21 ENCOUNTER — PHARMACY VISIT (OUTPATIENT)
Dept: PHARMACY | Facility: CLINIC | Age: 79
End: 2024-11-21
Payer: COMMERCIAL

## 2024-11-22 DIAGNOSIS — I48.91 ATRIAL FIBRILLATION WITH RVR (MULTI): ICD-10-CM

## 2024-11-22 RX ORDER — SPIRONOLACTONE 25 MG/1
25 TABLET ORAL DAILY
Qty: 90 TABLET | Refills: 3 | Status: SHIPPED | OUTPATIENT
Start: 2024-11-22

## 2024-11-22 RX ORDER — LOSARTAN POTASSIUM 50 MG/1
25 TABLET ORAL DAILY
Qty: 15 TABLET | Refills: 1 | Status: SHIPPED | OUTPATIENT
Start: 2024-11-22 | End: 2025-11-22

## 2024-12-04 DIAGNOSIS — I48.91 ATRIAL FIBRILLATION WITH RVR (MULTI): ICD-10-CM

## 2024-12-04 RX ORDER — LOSARTAN POTASSIUM 50 MG/1
25 TABLET ORAL DAILY
Qty: 45 TABLET | Refills: 0 | Status: SHIPPED | OUTPATIENT
Start: 2024-12-04 | End: 2025-12-04

## 2024-12-18 PROCEDURE — RXMED WILLOW AMBULATORY MEDICATION CHARGE

## 2024-12-20 ENCOUNTER — PHARMACY VISIT (OUTPATIENT)
Dept: PHARMACY | Facility: CLINIC | Age: 79
End: 2024-12-20
Payer: COMMERCIAL

## 2025-01-17 PROCEDURE — RXMED WILLOW AMBULATORY MEDICATION CHARGE

## 2025-01-20 ENCOUNTER — PHARMACY VISIT (OUTPATIENT)
Dept: PHARMACY | Facility: CLINIC | Age: 80
End: 2025-01-20
Payer: COMMERCIAL

## 2025-01-21 NOTE — PROGRESS NOTES
Pharmacist Clinic: Cardiology Management    Graciela Norman is a 79 y.o. female was referred to Clinical Pharmacy Team for anticoagulation and heart failure management.     Referring Provider: Markos Brock MD. Of note, referral will be adjusted to YANIRA Clement moving forwards as patient will be following with her. Appointment scheduled for 2/12/2025.    THIS IS A FOLLOW UP PATIENT APPOINTMENT. AT LAST VISIT ON 10/22/2024 WITH PHARMACIST (Tracee Berumen).    Appointment was completed by the patient who was reached at .     REVIEW OF PAST APPNT (IF APPLICABLE):   Today's appointment was 1 month follow up regarding anticoagulation and heart failure pharmacotherapy.  Anticoagulation:  Graciela is doing well on anticoagulation with Eliquis. No abnormal bruising/bleeding per patient. No hospitalizations or falls since last appointment. Reports adherence to twice daily dosing.   Heart Failure:  Graciela is doing well on current GDMT, reports no signs/symptoms of worsening HF at this time. States no additional urinary urgency or UTI since last appointment. She is not consistently monitoring her BP at home, but will monitor periodically. Reports her home BP averages around 132/74. Will not titrate losartan at this time, patient to call pharmacist or cardiology office if BP continuously stays above 130/80. Patient confirms understanding.  Will follow up in 3 months.      Allergies   Allergen Reactions    Aspirin Diarrhea    Diphenhydramine Other     Heart races, jittery    Iodinated Contrast Media Unknown    Penicillins Hives    Shellfish Derived Hives    Macrobid [Nitrofurantoin Monohyd/M-Cryst] Hives    Statins-Hmg-Coa Reductase Inhibitors Myalgia     Muscle aches    Sulfa (Sulfonamide Antibiotics) Rash       Past Medical History:   Diagnosis Date    Atrial fibrillation (Multi)     Deficiency of other specified B group vitamins 05/24/2021    Low vitamin B12 level    Diabetes (Multi)     FARTUN on  "CPAP     Personal history of other malignant neoplasm of large intestine 08/15/2022    History of malignant neoplasm of colon       Current Outpatient Medications on File Prior to Visit   Medication Sig Dispense Refill    acetaminophen (Tylenol) 500 mg tablet Take 2 tablets (1,000 mg) by mouth as needed at bedtime.      apixaban (Eliquis) 5 mg tablet Take 1 tablet (5 mg) by mouth 2 times a day. 60 tablet 11    blood pressure monitor kit 1 each once daily. 1 kit 0    cholecalciferol (Vitamin D-3) 25 MCG (1000 UT) tablet Take 1 tablet (1,000 Units) by mouth once daily at bedtime.      cyanocobalamin (Vitamin B-12) 1,000 mcg tablet Take 1 tablet (1,000 mcg) by mouth once daily.      empagliflozin (Jardiance) 10 mg Take 1 tablet (10 mg) by mouth once daily. 90 tablet 3    losartan (Cozaar) 50 mg tablet Take 0.5 tablets (25 mg) by mouth once daily. 45 tablet 0    metFORMIN (Glucophage) 500 mg tablet TAKE 1 TABLET BY MOUTH EVERY DAY 90 tablet 1    metoprolol tartrate (Lopressor) 25 mg tablet TAKE 1 TABLET BY MOUTH TWICE A  tablet 1    spironolactone (Aldactone) 25 mg tablet TAKE 1 TABLET BY MOUTH EVERY DAY 90 tablet 3     No current facility-administered medications on file prior to visit.         RELEVANT LAB RESULTS  Lab Results   Component Value Date    BILITOT 1.3 (H) 11/07/2024    CALCIUM 10.0 11/07/2024    CO2 30 11/07/2024     11/07/2024    CREATININE 1.20 (H) 11/07/2024    GLUCOSE 127 (H) 11/07/2024    ALKPHOS 74 11/07/2024    K 4.9 11/07/2024    PROT 7.0 11/07/2024     11/07/2024    AST 16 11/07/2024    ALT 19 11/07/2024    BUN 19 11/07/2024    ANIONGAP 12 11/07/2024    MG 1.81 01/02/2024    PHOS 3.3 01/02/2024    ALBUMIN 4.2 11/07/2024    GFRF 67 08/11/2023     Lab Results   Component Value Date    TRIG 136 08/11/2023    CHOL 225 (H) 08/11/2023    HDL 69.0 08/11/2023     No results found for: \"BMCBC\", \"CBCDIF\"     PHARMACEUTICAL ASSESSMENT    Drug Interactions? No clinically significant drug " interactions requiring change in therapy found at the time of this visit.     Medication Documentation Review Audit       Reviewed by Becky Gusman MD (Physician) on 11/14/24 at 1052      Medication Order Taking? Sig Documenting Provider Last Dose Status   acetaminophen (Tylenol) 500 mg tablet [id number] Yes Take 2 tablets (1,000 mg) by mouth as needed at bedtime. Historical Provider, MD Taking Differently Active     Discontinued 11/14/24 1049   apixaban (Eliquis) 5 mg tablet [id number] Yes Take 1 tablet (5 mg) by mouth 2 times a day. YANIRA Plascencia DNP Taking Active   blood pressure monitor kit [id number] Yes 1 each once daily. Markos Brock MD Taking Active   cholecalciferol (Vitamin D-3) 25 MCG (1000 UT) tablet [id number] Yes Take 1 tablet (1,000 Units) by mouth once daily at bedtime. Historical Provider, MD Taking Active   cyanocobalamin (Vitamin B-12) 1,000 mcg tablet [id number] Yes Take 1 tablet (1,000 mcg) by mouth once daily. Historical Provider, MD Taking Active   empagliflozin (Jardiance) 10 mg [id number] Yes Take 1 tablet (10 mg) by mouth once daily. YANIRA Plascencia DNP Taking Active   losartan (Cozaar) 50 mg tablet [id number] Yes TAKE 0.5 TABLETS (25 MG) BY MOUTH ONCE DAILY. Markos Brock MD Taking Active   metFORMIN (Glucophage) 500 mg tablet [id number] Yes TAKE 1 TABLET BY MOUTH EVERY DAY Becky Gusman MD Taking Active   metoprolol tartrate (Lopressor) 25 mg tablet [id number] Yes TAKE 1 TABLET BY MOUTH TWICE A DAY Becky Gusman MD  Active   spironolactone (Aldactone) 25 mg tablet [id number] Yes TAKE 1 TABLET BY MOUTH EVERY DAY YANIRA Plascencia DNP Taking Active                    DISEASE MANAGEMENT ASSESSMENT:     ANTICOAGULATION ASSESSMENT    The ASCVD Risk score (Irina POMPA, et al., 2019) failed to calculate for the following reasons:    Risk score cannot be calculated because patient has a medical history suggesting prior/existing ASCVD    DIAGNOSIS: prevention of  nonvalvular atrial fibrilliation stroke and systemic embolism  - Patient is projected to be on anticoagulation long term  - [id number] Score: [6] (only included if diagnosis is atrial fibrillation)   Age: [<65 (0)] [65-74 (+1)] [> 75 (+2)]: 2  Sex: [Male/Female (+1)]: 1  CHF history: [No/Yes(+1)]: 1  Hypertension history: [No/Yes(+1)]: 1  Stroke/TIA/thromboembolism history: [No/Yes(+2)]: 0  Vascular disease history (prior MI, peripheral artery disease, aortic plaque): [No/Yes(+1)]: 0  Diabetes history: [No/Yes(+1)]: 1    CURRENT PHARMACOTHERAPY:   Eliquis 5 mg BID  80 y/o  95.7 kg  Scr 1.20 mg/dL (11/7/2024)    RELEVANT PAST MEDICAL HISTORY:   A-fib, HTN, DLD, CHF, T2DM    Affordability/Accessibility: [facility] active through 3/25/2025  Adherence/Organization: confirms taking twice daily (~7-9AM and 7-9PM); patient uses medication box for organization. Denies any missed doses  Adverse Reactions: no abnormal bruising or bleeding; denies dark/tarry stools  Recent Hospitalizations: none   Recent Falls/Trauma: none   Changes in Tobacco or Alcohol Intake:   Tobacco: none, does not use   Alcohol: will have an occasional 1-2 glasses of wine about once every few months     CHF ASSESSMENT     Symptom/Staging:  -Most recent ejection fraction: 60%  -NYHA Stage: Unknown    Results for orders placed during the hospital encounter of 05/01/24    Transthoracic Echo ([place]) [place] [address]  Phone [phone], Fax [phone]    TRANSTHORACIC ECHOCARDIOGRAM REPORT      Patient Name:      PABLITO Matta Physician:    Steve Amor MD  Study Date:        5/1/2024             Ordering Provider:    Steve AMOR  MRN/PID:           [id number]             Fellow:  Accession#:        [id number]         Nurse:  Date of Birth/Age: 1945 / 79 years Sonographer:          Michael Esposito RDCS  Gender:            F                     Additional Staff:  Height:            157.48 cm            Admit Date:  Weight:            94.35 kg             Admission Status:     Outpatient  BSA / BMI:         1.94 m2 / 38.04      Department Location:  [facility] Echo Lab  kg/m2  Blood Pressure: 159 /68 mmHg    Study Type:    TRANSTHORACIC ECHO (TTE) COMPLETE  Diagnosis/ICD: Unspecified systolic (congestive) heart failure (CHF)-I50.20  CPT Codes:     Echo Complete w Full Doppler-15023  Study Detail: The following Echo studies were performed: 2D, M-Mode, Doppler and  color flow.      PHYSICIAN INTERPRETATION:  Left Ventricle: Left ventricular systolic function is normal, with an estimated ejection fraction of 60%. There are no regional wall motion abnormalities. The left ventricular cavity size is normal. Spectral Doppler shows an impaired relaxation pattern of left ventricular diastolic filling.  Left Atrium: The left atrium is normal in size.  Right Ventricle: The right ventricle is normal in size. There is normal right ventricular global systolic function.  Right Atrium: The right atrium is normal in size.  Aortic Valve: The aortic valve is trileaflet. There is no evidence of aortic valve regurgitation. The peak instantaneous gradient of the aortic valve is 11.8 mmHg. The mean gradient of the aortic valve is 6.0 mmHg.  Mitral Valve: The mitral valve is normal in structure. There is no evidence of mitral valve regurgitation. Calcified mitral annulus.  Tricuspid Valve: The tricuspid valve is structurally normal. There is trace tricuspid regurgitation.  Pulmonic Valve: The pulmonic valve is not well visualized. There is trace pulmonic valve regurgitation.  Pericardium: There is no pericardial effusion noted.  Aorta: The aortic root is normal.      CONCLUSIONS:  1. Left ventricular systolic function is normal with a 60% estimated ejection fraction.  2. Spectral Doppler shows an impaired relaxation pattern of left ventricular diastolic filling.  3. Compared to prior  echocardiogram dated 1/3/2024-ejection fraction appears to have improved. Notably Definity was not used on current echocardiogram.    QUANTITATIVE DATA SUMMARY:  2D MEASUREMENTS:  Normal Ranges:  Ao Root d:     3.10 cm   (2.0-3.7cm)  LAs:           3.90 cm   (2.7-4.0cm)  IVSd:          1.15 cm   (0.6-1.1cm)  LVPWd:         0.86 cm   (0.6-1.1cm)  LVIDd:         3.74 cm   (3.9-5.9cm)  LVIDs:         3.06 cm  LV Mass Index: 63.1 g/m2  LV % FS        18.1 %    LA VOLUME:  Normal Ranges:  LA Vol A4C:        23.6 ml    (22+/-6mL/m2)  LA Vol A2C:        33.3 ml  LA Vol BP:         29.7 ml  LA Vol Index A4C:  12.2ml/m2  LA Vol Index A2C:  17.1 ml/m2  LA Vol Index BP:   15.3 ml/m2  LA Area A4C:       11.7 cm2  LA Area A2C:       14.7 cm2  LA Major Axis A4C: 4.9 cm  LA Major Axis A2C: 5.5 cm  LA Volume Index:   11.6 ml/m2  LA Vol A4C:        22.5 ml  LA Vol A2C:        33.5 ml    LV SYSTOLIC FUNCTION BY 2D PLANIMETRY (MOD):  Normal Ranges:  EF-A4C View: 56.8 % (>=55%)  EF-A2C View: 63.5 %  EF-Biplane:  60.6 %    LV DIASTOLIC FUNCTION:  Normal Ranges:  MV Peak E:    0.65 m/s (0.7-1.2 m/s)  MV Peak A:    1.03 m/s (0.42-0.7 m/s)  E/A Ratio:    0.63     (1.0-2.2)  MV lateral e' 0.16 m/s  MV medial e'  0.10 m/s    MITRAL VALVE:  Normal Ranges:  MV DT: 296 msec (150-240msec)    AORTIC VALVE:  Normal Ranges:  AoV Vmax:                1.72 m/s  (<=1.7m/s)  AoV Peak P.8 mmHg (<20mmHg)  AoV Mean P.0 mmHg  (1.7-11.5mmHg)  LVOT Max Cade:            1.27 m/s  (<=1.1m/s)  AoV VTI:                 31.10 cm  (18-25cm)  LVOT VTI:                22.80 cm  LVOT Diameter:           1.80 cm   (1.8-2.4cm)  AoV Area, VTI:           1.87 cm2  (2.5-5.5cm2)  AoV Area,Vmax:           1.88 cm2  (2.5-4.5cm2)  AoV Dimensionless Index: 0.73      RIGHT VENTRICLE:  RV Basal 4.06 cm  RV Mid   2.83 cm  RV Major 8.0 cm  RV s'    0.17 m/s      [id number] Markos Brock MD  Electronically signed on 2024 at 3:29:50 PM        **  Final **      Guideline-Directed Medical Therapy:  -ARNI: No   -If no, then ACEi/ARB?: Yes, describe: losartan 50 mg 0.5 tablets (25 mg) every day   -Beta Blocker: Yes, describe: Metoprolol tartrate 25 mg BID  -MRA: Yes, describe: Spironolactone 25 mg every day   -SGLT2i: Yes, describe: Jardiance 10 mg every day     Secondary Prevention:  -The ASCVD Risk score (Irina DK, et al., 2019) failed to calculate for the following reasons:    Risk score cannot be calculated because patient has a medical history suggesting prior/existing ASCVD   -Aspirin 81mg? no  -Statin?: No  -HTN?: Yes, describe: 134/82 as of 11/14/2024    CURRENT PHARMACOTHERAPY:   Jardiance 10 mg every day   Losartan 50 mg 0.5 tablets (25 mg) every day   Metoprolol tartrate 25 mg BID  Spironolactone 25 mg every day     Affordability: [facility] - active through 3/25/2025  Adherence/Compliance: confirms taking medications as prescribed; denies any missed doses and uses a medication box for organization   Adverse Effects: none, denies any recent UTIs    Monitoring Weights at Home: Yes  Home Weight Recordings: 198 lbs (denies any acute fluctuations)    Past In Office Weight Readings:   Wt Readings from Last 6 Encounters:   11/14/24 95.7 kg (211 lb)   09/24/24 95.4 kg (210 lb 6.4 oz)   09/05/24 93 kg (205 lb)   08/15/24 94.2 kg (207 lb 11.2 oz)   08/05/24 94.6 kg (208 lb 8.9 oz)   08/02/24 95.3 kg (210 lb)       Monitoring Blood Pressure at Home: Yes, however only about 1-2 times per month  Home BP Recordings: ~130s/60-69, has not been above 130/80 per patient    Past In Office BP Readings:   BP Readings from Last 6 Encounters:   11/14/24 134/82   09/24/24 132/74   09/05/24 148/86   08/15/24 148/74   08/05/24 127/61   08/02/24 (!) 169/100       HEALTH MANAGEMENT    Maintaining fluid restriction (<2 L/day): N/A   Edema/swelling: No  Shortness of breath: No  Trouble sleeping/lying down: No  Dry/hacking cough: No  Recent Hospitalizations:  No    EDUCATION/COUNSELING:   - Counseled patient on MOA, expectations, duration of therapy, contraindications, administration, and monitoring parameters  - Counseled patient on lifestyle modifications that can decrease your risk of having complications (smoking cessation, losing weight, daily weights, vaccines)  - Counseled patient on fluid intake and weight management. Recommended to not consume more than 2 liters of fliuids per day. If they have gained more than 2-3 pounds within a 24 hours period (or 5 pounds in a week), contact their cardiologist  - Answered all patient questions and concerns   - Counseled patient on MOA, expectations, duration of therapy, contraindications, administration, and monitoring parameters  - Counseled patient of side effects that are indicative of bleeding such as dark tarry stool, unexplainable bruising, or vomiting up a coffee ground like substance       DISCUSSION/NOTES:   Today's appointment was 3 month follow up regarding anticoagulation and heart failure pharmacotherapy.   Graciela reports no abnormal bruising or bleeding with Eliquis. No recent hospitalizations or falls.  She reports no symptoms of worsening heart failure. Home BP readings have not exceeded 130/80 per patient. We also discussed continuing to monitor for recurrent UTI due to Jardiance, she reports no occurrences since last appointment.   Will continue current regimen and follow up in 1 month for  PAP approval.      ASSESSMENT AND PLAN:    Assessment/Plan   Problem List Items Addressed This Visit       Persistent atrial fibrillation (Multi) - Primary     Graciela continues on anticoagulation with Eliquis. No dosage adjustment needed for age, weight, and renal function.         Chronic combined systolic and diastolic heart failure     Graciela is doing well on HF regimen. No symptoms of worsening heart failure at today's appointment. Will continue current medications with no changes.         Relevant Orders     Referral to Clinical Pharmacy       RECOMMENDATIONS/PLAN    Continue:  Eliquis 5 mg BID  Jardiance 10 mg every day   Losartan 50 mg 0.5 tablets (25 mg) every day   Metoprolol tartrate 25 mg BID  Spironolactone 25 mg every day   Follow up: 1 month    Last Appnt with Referring Provider: 8/15/2024  Next Appnt with Referring Provider: 2/12/2025 (Chandrika Sanchez, APRN-CNP)  Clinical Pharmacist follow up: 2/27/2025  VAF/Application Expiration: Yes    Date: 3/25/2025  Type of Encounter: Isai Berumen PharmD    Verbal consent to manage patient's drug therapy was obtained from the patient . They were informed they may decline to participate or withdraw from participation in pharmacy services at any time.    Continue all meds under the continuation of care with the referring provider and clinical pharmacy team.

## 2025-01-22 ENCOUNTER — APPOINTMENT (OUTPATIENT)
Dept: PHARMACY | Facility: HOSPITAL | Age: 80
End: 2025-01-22
Payer: MEDICARE

## 2025-01-22 DIAGNOSIS — I48.19 PERSISTENT ATRIAL FIBRILLATION (MULTI): Primary | ICD-10-CM

## 2025-01-22 DIAGNOSIS — I50.42 CHRONIC COMBINED SYSTOLIC AND DIASTOLIC HEART FAILURE: ICD-10-CM

## 2025-01-22 NOTE — Clinical Note
Tiffanie Cobos and Graciela Moreno reports no abnormal bruising or bleeding with Eliquis. No recent hospitalizations or falls. She reports no symptoms of worsening heart failure. Home BP readings have not exceeded 130/80 per patient. We also discussed continuing to monitor for recurrent UTI due to Jardiance, she reports no occurrences since last appointment. No changes today, will follow up in 1 month for  PAP renewal.

## 2025-01-22 NOTE — ASSESSMENT & PLAN NOTE
Graciela continues on anticoagulation with Eliquis. No dosage adjustment needed for age, weight, and renal function.

## 2025-01-22 NOTE — ASSESSMENT & PLAN NOTE
Graciela is doing well on HF regimen. No symptoms of worsening heart failure at today's appointment. Will continue current medications with no changes.

## 2025-01-29 DIAGNOSIS — I48.0 PAROXYSMAL ATRIAL FIBRILLATION (MULTI): ICD-10-CM

## 2025-01-29 RX ORDER — METOPROLOL TARTRATE 25 MG/1
25 TABLET, FILM COATED ORAL 2 TIMES DAILY
Qty: 180 TABLET | Refills: 1 | Status: SHIPPED | OUTPATIENT
Start: 2025-01-29

## 2025-01-31 DIAGNOSIS — E11.65 TYPE 2 DIABETES MELLITUS WITH HYPERGLYCEMIA (MULTI): ICD-10-CM

## 2025-01-31 RX ORDER — METFORMIN HYDROCHLORIDE 500 MG/1
500 TABLET ORAL DAILY
Qty: 90 TABLET | Refills: 1 | Status: SHIPPED | OUTPATIENT
Start: 2025-01-31

## 2025-02-11 NOTE — PROGRESS NOTES
[facility]  Cardiology Clinic Visit Note    History of present illness:  This is a 79 y.o. female former smoker with a history of paroxysmal atrial fibrillation, nonischemic cardiomyopathy, colon cancer status post chemotherapy and resection in 2016, hypertension hyperlipidemia, obesity type 2 diabetes mellitus and rheumatic fever as child who presents to the clinic for 6-month follow-up.    Subjective:  She is doing extremely well.  She did fall over the winter due to icy conditions but denies any injuries other than some bruising on her leg. She did not hit her head.  She denies any recurrence of any A-fib symptoms. She denies chest pain, shortness of breath, palpitations, leg edema, fever, chills, orthopnea, paroxysmal nocturnal dyspnea or syncope.     Active Issues and Current Diagnoses  Paroxysmal atrial fibrillation  -Status post RFA and PVI August 2024  -Status post direct-current cardioversion February 2024  -Stopped amiodarone on 10/15/2024  -Eliquis 5 mg twice a day for primary stroke prevention  -Lopressor 25 mg twice a day    Chronic heart failure with recovered ejection fraction  Nonischemic cardiomyopathy  Hypertension  -Echocardiogram 1/3/2024 revealed LVEF of 35% with global hypokinesis and a severely dilated left atrium and moderate mitral valve regurgitation  -Repeat echocardiogram 5/1/2024 showed recovered LV systolic function with estimated LVEF of 60% with a normal left atrial size and no mitral valve regurgitation  -Current heart failure GDMT includes metoprolol tartrate 25 mg twice a day, spironolactone 25 mg daily, losartan 25 mg daily, and Jardiance 10 mg daily  -Hemoglobin A1c 6.6 11/7/2024    Assessment and Plan  -Sinus rhythm by clinical exam today.  Denies any recurrence of A-fib symptoms and denies any adverse bleeding events.  Stable chronic heart failure with recovered ejection fraction and appears euvolemic on exam.  Blood pressure slightly elevated in the clinic  however she reports a home average blood pressures ranging 130s over 80s.  She will continue her current medical regimen.  Call with any questions or new concerns.    Return to Care:  1 year    Objective  Past Medical History  Past Medical History:   Diagnosis Date    Atrial fibrillation (Multi)     Deficiency of other specified B group vitamins 05/24/2021    Low vitamin B12 level    Diabetes (Multi)     FARTUN on CPAP     Personal history of other malignant neoplasm of large intestine 08/15/2022    History of malignant neoplasm of colon       Past Surgical History  Past Surgical History:   Procedure Laterality Date    BREAST BIOPSY Right     CARDIAC ELECTROPHYSIOLOGY PROCEDURE N/A 8/5/2024    Procedure: Ablation A-Fib Paroxysmal (53361);  Surgeon: Nilson Woods MD;  Location: [facility] Cardiac Cath Lab;  Service: Electrophysiology;  Laterality: N/A;    OTHER SURGICAL HISTORY  01/27/2020    Colonoscopy    OTHER SURGICAL HISTORY  01/27/2020    Eye surgery    OTHER SURGICAL HISTORY  01/27/2020    Hernia repair    OTHER SURGICAL HISTORY  01/27/2020    Skin lesion excision    OTHER SURGICAL HISTORY  01/27/2020    Colectomy    OTHER SURGICAL HISTORY  01/27/2020    Salpingo-oophorectomy bilateral    OTHER SURGICAL HISTORY  01/27/2020    Ganglion cyst excision    OTHER SURGICAL HISTORY  01/27/2020    Cystoscopy    OTHER SURGICAL HISTORY  01/27/2020    Cataract surgery       Medications  Current Outpatient Medications   Medication Instructions    acetaminophen (TYLENOL) 1,000 mg, Nightly PRN    blood pressure monitor kit 1 each, miscellaneous, Daily    cholecalciferol (VITAMIN D-3) 1,000 Units, Nightly    cyanocobalamin (Vitamin B-12) 1,000 mcg tablet 1 tablet, Daily    Eliquis 5 mg, oral, 2 times daily    Jardiance 10 mg, oral, Daily    losartan (COZAAR) 25 mg, oral, Daily    metFORMIN (GLUCOPHAGE) 500 mg, oral, Daily    metoprolol tartrate (LOPRESSOR) 25 mg, oral, 2 times daily    spironolactone (ALDACTONE) 25 mg, oral, Daily        Allergies  Allergies   Allergen Reactions    Aspirin Diarrhea    Diphenhydramine Other     Heart races, jittery    Iodinated Contrast Media Unknown    Penicillins Hives    Shellfish Derived Hives    Macrobid [Nitrofurantoin Monohyd/M-Cryst] Hives    Statins-Hmg-Coa Reductase Inhibitors Myalgia     Muscle aches    Sulfa (Sulfonamide Antibiotics) Rash       Social History  Social History     Tobacco Use    Smoking status: Former     Types: Cigarettes     Passive exposure: Past    Smokeless tobacco: Never   Substance Use Topics    Alcohol use: Not Currently    Drug use: Never       Family History  Family History   Problem Relation Name Age of Onset    No Known Problems Mother      No Known Problems Father      Stroke Other paternal uncle        VITALS  Vitals:    02/13/25 1126   BP: 152/82   Pulse: 63   SpO2: 97%       Weight  Vitals:    02/13/25 1126   Weight: 96 kg (211 lb 9.6 oz)       PHYSICAL EXAM  Physical Exam  Vitals and nursing note reviewed.   Constitutional:       General: She is not in acute distress.  HENT:      Head: Normocephalic and atraumatic.      Mouth/Throat:      Mouth: Mucous membranes are moist.      Pharynx: Oropharynx is clear.   Eyes:      General: No scleral icterus.     Pupils: Pupils are equal, round, and reactive to light.   Cardiovascular:      Rate and Rhythm: Normal rate and regular rhythm.      Pulses: Normal pulses.      Heart sounds: Normal heart sounds, S1 normal and S2 normal. No murmur heard.     No friction rub.   Pulmonary:      Effort: Pulmonary effort is normal.      Breath sounds: Wheezing present.      Comments: Slight expiratory wheezes in lung bases.   Abdominal:      General: Bowel sounds are normal. There is no distension.      Palpations: Abdomen is soft.      Tenderness: There is no abdominal tenderness.   Musculoskeletal:         General: Normal range of motion.      Cervical back: Normal range of motion and neck supple.      Right lower leg: No edema.      Left  lower leg: No edema.   Skin:     General: Skin is warm and dry.      Capillary Refill: Capillary refill takes less than 2 seconds.      Findings: No rash.   Neurological:      General: No focal deficit present.      Mental Status: She is alert.   Psychiatric:         Mood and Affect: Mood normal.         Behavior: Behavior normal.         Cardiovascular Labs  Lab Results   Component Value Date    HGB 16.8 (H) 08/05/2024    HGB 17.0 (H) 07/29/2024    HGB 16.3 (H) 06/13/2024     08/05/2024    WBC 7.2 08/05/2024     11/07/2024    K 4.9 11/07/2024    CREATININE 1.20 (H) 11/07/2024    CREATININE 1.23 (H) 07/29/2024    CREATININE 1.11 (H) 06/13/2024    BUN 19 11/07/2024    CALCIUM 10.0 11/07/2024     (H) 01/02/2024    TROPHS 33 (H) 01/02/2024    LDLF 129 (H) 08/11/2023       Echocardiogram  Results for orders placed during the hospital encounter of 05/01/24    Transthoracic Echo (TTE) [place] [address]  Phone [phone], Fax [phone]    TRANSTHORACIC ECHOCARDIOGRAM REPORT      Patient Name:      PABLITO NAILS KERRI Matta Physician:    [id number] Amor MD  Study Date:        5/1/2024             Ordering Provider:    Steve AMOR  MRN/PID:           [id number]             Fellow:  Accession#:        [id number]         Nurse:  Date of Birth/Age: 1945 / 79 years Sonographer:          Michael Esposito RDCS  Gender:            F                    Additional Staff:  Height:            157.48 cm            Admit Date:  Weight:            94.35 kg             Admission Status:     Outpatient  BSA / BMI:         1.94 m2 / 38.04      Department Location:  [facility] Echo Lab  kg/m2  Blood Pressure: 159 /68 mmHg    Study Type:    TRANSTHORACIC ECHO (TTE) COMPLETE  Diagnosis/ICD: Unspecified systolic (congestive) heart failure (CHF)-I50.20  CPT Codes:     Echo Complete w Full Doppler-88282  Study Detail: The following  Echo studies were performed: 2D, M-Mode, Doppler and  color flow.      PHYSICIAN INTERPRETATION:  Left Ventricle: Left ventricular systolic function is normal, with an estimated ejection fraction of 60%. There are no regional wall motion abnormalities. The left ventricular cavity size is normal. Spectral Doppler shows an impaired relaxation pattern of left ventricular diastolic filling.  Left Atrium: The left atrium is normal in size.  Right Ventricle: The right ventricle is normal in size. There is normal right ventricular global systolic function.  Right Atrium: The right atrium is normal in size.  Aortic Valve: The aortic valve is trileaflet. There is no evidence of aortic valve regurgitation. The peak instantaneous gradient of the aortic valve is 11.8 mmHg. The mean gradient of the aortic valve is 6.0 mmHg.  Mitral Valve: The mitral valve is normal in structure. There is no evidence of mitral valve regurgitation. Calcified mitral annulus.  Tricuspid Valve: The tricuspid valve is structurally normal. There is trace tricuspid regurgitation.  Pulmonic Valve: The pulmonic valve is not well visualized. There is trace pulmonic valve regurgitation.  Pericardium: There is no pericardial effusion noted.  Aorta: The aortic root is normal.      CONCLUSIONS:  1. Left ventricular systolic function is normal with a 60% estimated ejection fraction.  2. Spectral Doppler shows an impaired relaxation pattern of left ventricular diastolic filling.  3. Compared to prior echocardiogram dated 1/3/2024-ejection fraction appears to have improved. Notably Definity was not used on current echocardiogram.    QUANTITATIVE DATA SUMMARY:  2D MEASUREMENTS:  Normal Ranges:  Ao Root d:     3.10 cm   (2.0-3.7cm)  LAs:           3.90 cm   (2.7-4.0cm)  IVSd:          1.15 cm   (0.6-1.1cm)  LVPWd:         0.86 cm   (0.6-1.1cm)  LVIDd:         3.74 cm   (3.9-5.9cm)  LVIDs:         3.06 cm  LV Mass Index: 63.1 g/m2  LV % FS        18.1 %    LA  VOLUME:  Normal Ranges:  LA Vol A4C:        23.6 ml    (22+/-6mL/m2)  LA Vol A2C:        33.3 ml  LA Vol BP:         29.7 ml  LA Vol Index A4C:  12.2ml/m2  LA Vol Index A2C:  17.1 ml/m2  LA Vol Index BP:   15.3 ml/m2  LA Area A4C:       11.7 cm2  LA Area A2C:       14.7 cm2  LA Major Axis A4C: 4.9 cm  LA Major Axis A2C: 5.5 cm  LA Volume Index:   11.6 ml/m2  LA Vol A4C:        22.5 ml  LA Vol A2C:        33.5 ml    LV SYSTOLIC FUNCTION BY 2D PLANIMETRY (MOD):  Normal Ranges:  EF-A4C View: 56.8 % (>=55%)  EF-A2C View: 63.5 %  EF-Biplane:  60.6 %    LV DIASTOLIC FUNCTION:  Normal Ranges:  MV Peak E:    0.65 m/s (0.7-1.2 m/s)  MV Peak A:    1.03 m/s (0.42-0.7 m/s)  E/A Ratio:    0.63     (1.0-2.2)  MV lateral e' 0.16 m/s  MV medial e'  0.10 m/s    MITRAL VALVE:  Normal Ranges:  MV DT: 296 msec (150-240msec)    AORTIC VALVE:  Normal Ranges:  AoV Vmax:                1.72 m/s  (<=1.7m/s)  AoV Peak P.8 mmHg (<20mmHg)  AoV Mean P.0 mmHg  (1.7-11.5mmHg)  LVOT Max Cade:            1.27 m/s  (<=1.1m/s)  AoV VTI:                 31.10 cm  (18-25cm)  LVOT VTI:                22.80 cm  LVOT Diameter:           1.80 cm   (1.8-2.4cm)  AoV Area, VTI:           1.87 cm2  (2.5-5.5cm2)  AoV Area,Vmax:           1.88 cm2  (2.5-4.5cm2)  AoV Dimensionless Index: 0.73      RIGHT VENTRICLE:  RV Basal 4.06 cm  RV Mid   2.83 cm  RV Major 8.0 cm  RV s'    0.17 m/s      [id number] Markos Brock MD  Electronically signed on 2024 at 3:29:50 PM        ** Final **       The ASCVD Risk score (Irina DK, et al., 2019) failed to calculate for the following reasons:    Risk score cannot be calculated because patient has a medical history suggesting prior/existing ASCVD  Low Risk: <5%  Borderline Risk: 5%-7.4%  Intermediate Risk: 7.5% - 19.9%  High Risk: >20%    If your symptoms worsen or progress please go directory to your nearest emergency department for evaluation.     Thank you for this interesting clinical case  and allowing me to participate in the care of this patient. Please reach me out if you have any questions or if you need any clarifications regarding this patient's care.    **Disclaimer: This note was dictated by speech recognition, and every effort has been made to prevent any error in transcription, however minor errors may be present**  ___________________________________________________  Nolan Brizuela, MSN, CNP, ACNPC, CCRN  Advanced Practice Provider, Nurse Practitioner  Division of Cardiovascular Medicine  [facility]  [facility]

## 2025-02-12 ENCOUNTER — APPOINTMENT (OUTPATIENT)
Dept: CARDIOLOGY | Facility: CLINIC | Age: 80
End: 2025-02-12
Payer: MEDICARE

## 2025-02-12 PROCEDURE — RXMED WILLOW AMBULATORY MEDICATION CHARGE

## 2025-02-13 ENCOUNTER — OFFICE VISIT (OUTPATIENT)
Dept: CARDIOLOGY | Facility: CLINIC | Age: 80
End: 2025-02-13
Payer: MEDICARE

## 2025-02-13 ENCOUNTER — APPOINTMENT (OUTPATIENT)
Dept: CARDIOLOGY | Facility: CLINIC | Age: 80
End: 2025-02-13
Payer: MEDICARE

## 2025-02-13 VITALS
WEIGHT: 211.6 LBS | DIASTOLIC BLOOD PRESSURE: 82 MMHG | SYSTOLIC BLOOD PRESSURE: 152 MMHG | OXYGEN SATURATION: 97 % | HEIGHT: 62 IN | BODY MASS INDEX: 38.94 KG/M2 | HEART RATE: 63 BPM

## 2025-02-13 DIAGNOSIS — I42.8 NICM (NONISCHEMIC CARDIOMYOPATHY) (MULTI): ICD-10-CM

## 2025-02-13 DIAGNOSIS — I50.32 HEART FAILURE WITH RECOVERED EJECTION FRACTION (HFRECEF): ICD-10-CM

## 2025-02-13 DIAGNOSIS — I48.0 PAROXYSMAL ATRIAL FIBRILLATION (MULTI): ICD-10-CM

## 2025-02-13 DIAGNOSIS — I10 BENIGN HYPERTENSION: ICD-10-CM

## 2025-02-13 DIAGNOSIS — Z98.890 S/P ABLATION OPERATION FOR ARRHYTHMIA: Primary | ICD-10-CM

## 2025-02-13 DIAGNOSIS — Z86.79 S/P ABLATION OPERATION FOR ARRHYTHMIA: Primary | ICD-10-CM

## 2025-02-13 PROCEDURE — 1159F MED LIST DOCD IN RCRD: CPT

## 2025-02-13 PROCEDURE — 3077F SYST BP >= 140 MM HG: CPT

## 2025-02-13 PROCEDURE — 99214 OFFICE O/P EST MOD 30 MIN: CPT

## 2025-02-13 PROCEDURE — 1036F TOBACCO NON-USER: CPT

## 2025-02-13 PROCEDURE — 3079F DIAST BP 80-89 MM HG: CPT

## 2025-02-14 ENCOUNTER — PHARMACY VISIT (OUTPATIENT)
Dept: PHARMACY | Facility: CLINIC | Age: 80
End: 2025-02-14
Payer: COMMERCIAL

## 2025-02-26 PROBLEM — E66.812 CLASS 2 OBESITY WITH BODY MASS INDEX (BMI) OF 38.0 TO 38.9 IN ADULT: Status: ACTIVE | Noted: 2025-02-26

## 2025-02-26 NOTE — PROGRESS NOTES
Pharmacist Clinic: Cardiology Management    Graciela Norman is a 79 y.o. female was referred to Clinical Pharmacy Team for anticoagulation and heart failure management.     Referring Provider: Chandrika Sanchez APRN-CN*. Of note, referral will be adjusted to Nolan DOYLE moving forwards as patient will be following with him. Last appointment 2/13/2025, next appointment 2/17/2026.    THIS IS A FOLLOW UP PATIENT APPOINTMENT. AT LAST VISIT ON 1/22/2025 WITH PHARMACIST (Tracee Berumen).    Appointment was completed by the patient who was reached at .     REVIEW OF PAST APPNT (IF APPLICABLE):   Today's appointment was 3 month follow up regarding anticoagulation and heart failure pharmacotherapy.   Graciela reports no abnormal bruising or bleeding with Eliquis. No recent hospitalizations or falls.  She reports no symptoms of worsening heart failure. Home BP readings have not exceeded 130/80 per patient. We also discussed continuing to monitor for recurrent UTI due to Jardiance, she reports no occurrences since last appointment.   Will continue current regimen and follow up in 1 month for  PAP approval.      Allergies   Allergen Reactions    Aspirin Diarrhea    Diphenhydramine Other     Heart races, jittery    Iodinated Contrast Media Unknown    Penicillins Hives    Shellfish Derived Hives    Macrobid [Nitrofurantoin Monohyd/M-Cryst] Hives    Statins-Hmg-Coa Reductase Inhibitors Myalgia     Muscle aches    Sulfa (Sulfonamide Antibiotics) Rash       Past Medical History:   Diagnosis Date    Atrial fibrillation (Multi)     Deficiency of other specified B group vitamins 05/24/2021    Low vitamin B12 level    Diabetes (Multi)     FARTUN on CPAP     Personal history of other malignant neoplasm of large intestine 08/15/2022    History of malignant neoplasm of colon       Current Outpatient Medications on File Prior to Visit   Medication Sig Dispense Refill    acetaminophen (Tylenol) 500 mg tablet Take 2  "tablets (1,000 mg) by mouth as needed at bedtime.      apixaban (Eliquis) 5 mg tablet Take 1 tablet (5 mg) by mouth 2 times a day. 60 tablet 11    blood pressure monitor kit 1 each once daily. 1 kit 0    cholecalciferol (Vitamin D-3) 25 MCG (1000 UT) tablet Take 1 tablet (1,000 Units) by mouth once daily at bedtime.      cyanocobalamin (Vitamin B-12) 1,000 mcg tablet Take 1 tablet (1,000 mcg) by mouth once daily.      empagliflozin (Jardiance) 10 mg Take 1 tablet (10 mg) by mouth once daily. 90 tablet 3    losartan (Cozaar) 50 mg tablet Take 0.5 tablets (25 mg) by mouth once daily. 45 tablet 0    metFORMIN (Glucophage) 500 mg tablet TAKE 1 TABLET BY MOUTH EVERY DAY 90 tablet 1    metoprolol tartrate (Lopressor) 25 mg tablet Take 1 tablet (25 mg) by mouth 2 times a day. 180 tablet 1    spironolactone (Aldactone) 25 mg tablet TAKE 1 TABLET BY MOUTH EVERY DAY 90 tablet 3     No current facility-administered medications on file prior to visit.         RELEVANT LAB RESULTS  Lab Results   Component Value Date    BILITOT 1.3 (H) 11/07/2024    CALCIUM 10.0 11/07/2024    CO2 30 11/07/2024     11/07/2024    CREATININE 1.20 (H) 11/07/2024    GLUCOSE 127 (H) 11/07/2024    ALKPHOS 74 11/07/2024    K 4.9 11/07/2024    PROT 7.0 11/07/2024     11/07/2024    AST 16 11/07/2024    ALT 19 11/07/2024    BUN 19 11/07/2024    ANIONGAP 12 11/07/2024    MG 1.81 01/02/2024    PHOS 3.3 01/02/2024    ALBUMIN 4.2 11/07/2024    GFRF 67 08/11/2023     Lab Results   Component Value Date    TRIG 136 08/11/2023    CHOL 225 (H) 08/11/2023    HDL 69.0 08/11/2023     No results found for: \"BMCBC\", \"CBCDIF\"     PHARMACEUTICAL ASSESSMENT    Drug Interactions? No clinically significant drug interactions requiring change in therapy found at the time of this visit.     Medication Documentation Review Audit       Reviewed by Anahi Geiger MA (Medical Assistant) on 02/13/25 at 1126      Medication Order Taking? Sig Documenting Provider Last Dose " Status   acetaminophen (Tylenol) 500 mg tablet [id number] Yes Take 2 tablets (1,000 mg) by mouth as needed at bedtime. Historical Provider, MD Taking Differently Active   apixaban (Eliquis) 5 mg tablet [id number] Yes Take 1 tablet (5 mg) by mouth 2 times a day. YANIRA Plascencia DNP Taking Active   blood pressure monitor kit [id number] Yes 1 each once daily. Markos Brock MD Taking Active   cholecalciferol (Vitamin D-3) 25 MCG (1000 UT) tablet [id number] Yes Take 1 tablet (1,000 Units) by mouth once daily at bedtime. Historical Provider, MD Taking Active   cyanocobalamin (Vitamin B-12) 1,000 mcg tablet [id number] Yes Take 1 tablet (1,000 mcg) by mouth once daily. Historical Provider, MD Taking Active   empagliflozin (Jardiance) 10 mg [id number] Yes Take 1 tablet (10 mg) by mouth once daily. YANIRA Plascencia DNP Taking Active   losartan (Cozaar) 50 mg tablet [id number] Yes Take 0.5 tablets (25 mg) by mouth once daily. YANIRA Plascencia DNP  Active   metFORMIN (Glucophage) 500 mg tablet [id number] Yes TAKE 1 TABLET BY MOUTH EVERY DAY Becky Gusman MD  Active   metoprolol tartrate (Lopressor) 25 mg tablet [id number] Yes Take 1 tablet (25 mg) by mouth 2 times a day. Becky Gusman MD  Active   spironolactone (Aldactone) 25 mg tablet [id number] Yes TAKE 1 TABLET BY MOUTH EVERY DAY Markos Brock MD  Active                    DISEASE MANAGEMENT ASSESSMENT:     ANTICOAGULATION ASSESSMENT    The ASCVD Risk score (Irina POMPA, et al., 2019) failed to calculate for the following reasons:    Risk score cannot be calculated because patient has a medical history suggesting prior/existing ASCVD    DIAGNOSIS: prevention of nonvalvular atrial fibrilliation stroke and systemic embolism  - Patient is projected to be on anticoagulation long term  - [id number] Score: [6] (only included if diagnosis is atrial fibrillation)   Age: [<65 (0)] [65-74 (+1)] [> 75 (+2)]: 2  Sex: [Male/Female (+1)]: 1  CHF history:  [No/Yes(+1)]: 1  Hypertension history: [No/Yes(+1)]: 1  Stroke/TIA/thromboembolism history: [No/Yes(+2)]: 0  Vascular disease history (prior MI, peripheral artery disease, aortic plaque): [No/Yes(+1)]: 0  Diabetes history: [No/Yes(+1)]: 1    CURRENT PHARMACOTHERAPY:   Eliquis 5 mg BID  78 y/o  96 kg  Scr 1.20 mg/dL (11/7/2024)    RELEVANT PAST MEDICAL HISTORY:   A-fib, HTN, DLD, CHF, T2DM    Affordability/Accessibility:  PAP active through 3/25/2025  Adherence/Organization: confirms taking twice daily (~7-9AM and 7-9PM); patient uses medication box for organization. Denies any recent missed doses  Adverse Reactions: no abnormal bruising or bleeding; no dark/tarry stools  Recent Hospitalizations: none   Recent Falls/Trauma: none   Changes in Tobacco or Alcohol Intake:   Tobacco: none, does not use   Alcohol: will have an occasional 1-2 glasses of wine about once every few months     CHF ASSESSMENT     Symptom/Staging:  -Most recent ejection fraction: 60%  -NYHA Stage: Unknown    Results for orders placed during the hospital encounter of 05/01/24    Transthoracic Echo (TTE) Complete    [place] [address]  Phone [phone], Fax [phone]    TRANSTHORACIC ECHOCARDIOGRAM REPORT      Patient Name:      PABLITO Matta Physician:    Steve Amor MD  Study Date:        5/1/2024             Ordering Provider:    Steve AMOR  MRN/PID:           [id number]             Fellow:  Accession#:        [id number]         Nurse:  Date of Birth/Age: 1945 / 79 years Sonographer:          Michael Esposito RDCS  Gender:            F                    Additional Staff:  Height:            157.48 cm            Admit Date:  Weight:            94.35 kg             Admission Status:     Outpatient  BSA / BMI:         1.94 m2 / 38.04      Department Location:  [facility] Lab  kg/m2  Blood Pressure: 159 /68 mmHg    Study Type:     TRANSTHORACIC ECHO (TTE) COMPLETE  Diagnosis/ICD: Unspecified systolic (congestive) heart failure (CHF)-I50.20  CPT Codes:     Echo Complete w Full Doppler-83316  Study Detail: The following Echo studies were performed: 2D, M-Mode, Doppler and  color flow.      PHYSICIAN INTERPRETATION:  Left Ventricle: Left ventricular systolic function is normal, with an estimated ejection fraction of 60%. There are no regional wall motion abnormalities. The left ventricular cavity size is normal. Spectral Doppler shows an impaired relaxation pattern of left ventricular diastolic filling.  Left Atrium: The left atrium is normal in size.  Right Ventricle: The right ventricle is normal in size. There is normal right ventricular global systolic function.  Right Atrium: The right atrium is normal in size.  Aortic Valve: The aortic valve is trileaflet. There is no evidence of aortic valve regurgitation. The peak instantaneous gradient of the aortic valve is 11.8 mmHg. The mean gradient of the aortic valve is 6.0 mmHg.  Mitral Valve: The mitral valve is normal in structure. There is no evidence of mitral valve regurgitation. Calcified mitral annulus.  Tricuspid Valve: The tricuspid valve is structurally normal. There is trace tricuspid regurgitation.  Pulmonic Valve: The pulmonic valve is not well visualized. There is trace pulmonic valve regurgitation.  Pericardium: There is no pericardial effusion noted.  Aorta: The aortic root is normal.      CONCLUSIONS:  1. Left ventricular systolic function is normal with a 60% estimated ejection fraction.  2. Spectral Doppler shows an impaired relaxation pattern of left ventricular diastolic filling.  3. Compared to prior echocardiogram dated 1/3/2024-ejection fraction appears to have improved. Notably Definity was not used on current echocardiogram.    QUANTITATIVE DATA SUMMARY:  2D MEASUREMENTS:  Normal Ranges:  Ao Root d:     3.10 cm   (2.0-3.7cm)  LAs:           3.90 cm   (2.7-4.0cm)  IVSd:           1.15 cm   (0.6-1.1cm)  LVPWd:         0.86 cm   (0.6-1.1cm)  LVIDd:         3.74 cm   (3.9-5.9cm)  LVIDs:         3.06 cm  LV Mass Index: 63.1 g/m2  LV % FS        18.1 %    LA VOLUME:  Normal Ranges:  LA Vol A4C:        23.6 ml    (22+/-6mL/m2)  LA Vol A2C:        33.3 ml  LA Vol BP:         29.7 ml  LA Vol Index A4C:  12.2ml/m2  LA Vol Index A2C:  17.1 ml/m2  LA Vol Index BP:   15.3 ml/m2  LA Area A4C:       11.7 cm2  LA Area A2C:       14.7 cm2  LA Major Axis A4C: 4.9 cm  LA Major Axis A2C: 5.5 cm  LA Volume Index:   11.6 ml/m2  LA Vol A4C:        22.5 ml  LA Vol A2C:        33.5 ml    LV SYSTOLIC FUNCTION BY 2D PLANIMETRY (MOD):  Normal Ranges:  EF-A4C View: 56.8 % (>=55%)  EF-A2C View: 63.5 %  EF-Biplane:  60.6 %    LV DIASTOLIC FUNCTION:  Normal Ranges:  MV Peak E:    0.65 m/s (0.7-1.2 m/s)  MV Peak A:    1.03 m/s (0.42-0.7 m/s)  E/A Ratio:    0.63     (1.0-2.2)  MV lateral e' 0.16 m/s  MV medial e'  0.10 m/s    MITRAL VALVE:  Normal Ranges:  MV DT: 296 msec (150-240msec)    AORTIC VALVE:  Normal Ranges:  AoV Vmax:                1.72 m/s  (<=1.7m/s)  AoV Peak P.8 mmHg (<20mmHg)  AoV Mean P.0 mmHg  (1.7-11.5mmHg)  LVOT Max Cade:            1.27 m/s  (<=1.1m/s)  AoV VTI:                 31.10 cm  (18-25cm)  LVOT VTI:                22.80 cm  LVOT Diameter:           1.80 cm   (1.8-2.4cm)  AoV Area, VTI:           1.87 cm2  (2.5-5.5cm2)  AoV Area,Vmax:           1.88 cm2  (2.5-4.5cm2)  AoV Dimensionless Index: 0.73      RIGHT VENTRICLE:  RV Basal 4.06 cm  RV Mid   2.83 cm  RV Major 8.0 cm  RV s'    0.17 m/s      [id number] Markos Brock MD  Electronically signed on 2024 at 3:29:50 PM        ** Final **      Guideline-Directed Medical Therapy:  -ARNI: No   -If no, then ACEi/ARB?: Yes, describe: losartan 50 mg 0.5 tablets (25 mg) every day   -Beta Blocker: Yes, describe: Metoprolol tartrate 25 mg BID  -MRA: Yes, describe: Spironolactone 25 mg every day   -SGLT2i: Yes,  describe: Jardiance 10 mg every day     Secondary Prevention:  -The ASCVD Risk score (Irina POMPA, et al., 2019) failed to calculate for the following reasons:    Risk score cannot be calculated because patient has a medical history suggesting prior/existing ASCVD   -Aspirin 81mg? no  -Statin?: No  -HTN?: Yes, describe: 152/82 as of 2/13/2025    CURRENT PHARMACOTHERAPY:   Jardiance 10 mg every day   Losartan 50 mg 0.5 tablets (25 mg) every day   Metoprolol tartrate 25 mg BID  Spironolactone 25 mg every day     Affordability:  PAP - active through 3/25/2025  Adherence/Compliance: confirms taking medications as prescribed; denies any missed doses and uses a medication box for organization   Adverse Effects: none, denies any recent UTIs    Monitoring Weights at Home: Yes  Home Weight Recordings: 198 lbs (denies any acute fluctuations)    Past In Office Weight Readings:   Wt Readings from Last 6 Encounters:   02/13/25 96 kg (211 lb 9.6 oz)   11/14/24 95.7 kg (211 lb)   09/24/24 95.4 kg (210 lb 6.4 oz)   09/05/24 93 kg (205 lb)   08/15/24 94.2 kg (207 lb 11.2 oz)   08/05/24 94.6 kg (208 lb 8.9 oz)       Monitoring Blood Pressure at Home: Yes, however only about 1-2 times per month  Home BP Recordings: ~130s/60-69, has not been above 130/80 per patient. No changes since last appointment.    Past In Office BP Readings:   BP Readings from Last 6 Encounters:   02/13/25 152/82   11/14/24 134/82   09/24/24 132/74   09/05/24 148/86   08/15/24 148/74   08/05/24 127/61       HEALTH MANAGEMENT    Maintaining fluid restriction (<2 L/day): N/A   Edema/swelling: No  Shortness of breath: No  Trouble sleeping/lying down: No  Dry/hacking cough: No  Recent Hospitalizations: No    EDUCATION/COUNSELING:   - Counseled patient on MOA, expectations, duration of therapy, contraindications, administration, and monitoring parameters  - Counseled patient on lifestyle modifications that can decrease your risk of having complications (smoking  cessation, losing weight, daily weights, vaccines)  - Counseled patient on fluid intake and weight management. Recommended to not consume more than 2 liters of fliuids per day. If they have gained more than 2-3 pounds within a 24 hours period (or 5 pounds in a week), contact their cardiologist  - Answered all patient questions and concerns   - Counseled patient of side effects that are indicative of bleeding such as dark tarry stool, unexplainable bruising, or vomiting up a coffee ground like substance      Patient Assistance Program (PAP) - RENEWAL     Per regulation, patient is due for their annual renewal for their  PAP application. Patient's application is due for renewal by 3/25/2025. Patient understands that if proper documentation is not received before renewal date, they will no longer be able to receive approved medication(s) free of charge.     Application for program to be submitted for the following medications: Eliquis, Jardiance    [facility] Permanent Address: [facility] [place]   Prescription Insurance:   Yes   Members of Household: 2   Files Taxes: No - SSB for patient and spouse, disability for spouse       Patient will be faxing financial information to pharmacist directly at .    Patient verbally reports monthly or yearly income which is less than 400% federal poverty level    Patient aware this process may take up to 6 weeks.     If approved medication must be filled through [facility] and MEDICATION WILL BE MAILED TO PATIENT.       DISCUSSION/NOTES:   Today's appointment was 1 month follow up regarding anticoagulation and heart failure pharmacotherapy.  Graciela reports no abnormal bruising or bleeding. No recent hospitalizations or falls. No concerns regarding adherence at this time.  Graciela reports no symptoms or worsening heart failure. She states home blood pressure readings have been consistently <130/80, no changes since last appointment. Reports no further  symptoms of UTI with Jardiance.   Will continue current regimen and follow up in 3 months.    ASSESSMENT AND PLAN:    Assessment/Plan   Problem List Items Addressed This Visit       Persistent atrial fibrillation (Multi) - Primary     Graciela continues on anticoagulation with Eliquis. No dosage adjustment needed for age, weight, and renal function.         Relevant Orders    Referral to Clinical Pharmacy    Chronic combined systolic and diastolic heart failure     Graciela is doing well on HF regimen. No symptoms of worsening heart failure at today's appointment. Will continue current medications with no changes.         Relevant Orders    Referral to Clinical Pharmacy       RECOMMENDATIONS/PLAN    Continue:  Jardiance 10 mg every day   Losartan 50 mg 0.5 tablets (25 mg) every day   Metoprolol tartrate 25 mg BID  Spironolactone 25 mg every day   Eliquis 5 mg BID  Follow up: 3 months    Last Appnt with Referring Provider: 2/13/2025  Next Appnt with Referring Provider: 2/17/2026  Clinical Pharmacist follow up: 5/29/2025  VAF/Application Expiration: Yes    Date: 3/25/2025  Type of Encounter: Isai Berumen PharmD    Verbal consent to manage patient's drug therapy was obtained from the patient . They were informed they may decline to participate or withdraw from participation in pharmacy services at any time.    Continue all meds under the continuation of care with the referring provider and clinical pharmacy team.

## 2025-02-27 ENCOUNTER — APPOINTMENT (OUTPATIENT)
Dept: PHARMACY | Facility: HOSPITAL | Age: 80
End: 2025-02-27
Payer: MEDICARE

## 2025-02-27 DIAGNOSIS — I50.42 CHRONIC COMBINED SYSTOLIC AND DIASTOLIC HEART FAILURE: ICD-10-CM

## 2025-02-27 DIAGNOSIS — I48.19 PERSISTENT ATRIAL FIBRILLATION (MULTI): Primary | ICD-10-CM

## 2025-02-27 NOTE — Clinical Note
Tiffanie Cobos and Nolan, Today's appointment was 1 month follow up regarding anticoagulation and heart failure pharmacotherapy. Graciela reports no abnormal bruising or bleeding. No recent hospitalizations or falls. No concerns regarding adherence at this time. No symptoms of worsening heart failure at today's appointment, also reports home BP readings have been at goal. Of note, referral will be adjusted to be through Nolan going forward as the patient has established with him since last seeing Dr. Brock. No changes today, will follow up in 3 months.

## 2025-03-11 ENCOUNTER — TELEPHONE (OUTPATIENT)
Dept: PHARMACY | Facility: HOSPITAL | Age: 80
End: 2025-03-11
Payer: MEDICARE

## 2025-03-11 NOTE — TELEPHONE ENCOUNTER
Patient Assistance Program Approval:     We are pleased to inform you that your application for assistance has been approved.     This approval is valid through  3/10/2026  as long as the following criteria continue to be satisfied:     Your medication (Eliquis, Jardiance) remains covered under your current insurance plan.   Your prescriber does not discontinue therapy.   You do not seek reimbursement from any other private or government-funded programs for the  medication.    Under this program, the pharmacy will first bill your insurance plan for your indemnified specified medication. The [employer] Assistance Fund will then offset your copay balance, so that your out-of pocket expense for your specialty medication will be $0.00.    Tracee Berumen, LizzieD

## 2025-03-13 DIAGNOSIS — I48.91 ATRIAL FIBRILLATION WITH RVR (MULTI): ICD-10-CM

## 2025-03-13 PROCEDURE — RXMED WILLOW AMBULATORY MEDICATION CHARGE

## 2025-03-13 RX ORDER — LOSARTAN POTASSIUM 25 MG/1
25 TABLET ORAL DAILY
Qty: 90 TABLET | Refills: 3 | Status: SHIPPED | OUTPATIENT
Start: 2025-03-13 | End: 2026-03-13

## 2025-03-14 ENCOUNTER — PHARMACY VISIT (OUTPATIENT)
Dept: PHARMACY | Facility: CLINIC | Age: 80
End: 2025-03-14
Payer: COMMERCIAL

## 2025-03-20 ENCOUNTER — APPOINTMENT (OUTPATIENT)
Dept: CARDIOLOGY | Facility: CLINIC | Age: 80
End: 2025-03-20
Payer: MEDICARE

## 2025-03-20 VITALS
HEART RATE: 69 BPM | HEIGHT: 62 IN | SYSTOLIC BLOOD PRESSURE: 128 MMHG | OXYGEN SATURATION: 96 % | BODY MASS INDEX: 39.08 KG/M2 | WEIGHT: 212.4 LBS | DIASTOLIC BLOOD PRESSURE: 76 MMHG

## 2025-03-20 DIAGNOSIS — Z86.79 S/P ABLATION OPERATION FOR ARRHYTHMIA: Primary | ICD-10-CM

## 2025-03-20 DIAGNOSIS — I48.19 PERSISTENT ATRIAL FIBRILLATION (MULTI): ICD-10-CM

## 2025-03-20 DIAGNOSIS — I50.32 HEART FAILURE WITH RECOVERED EJECTION FRACTION (HFRECEF): ICD-10-CM

## 2025-03-20 DIAGNOSIS — Z98.890 S/P ABLATION OPERATION FOR ARRHYTHMIA: Primary | ICD-10-CM

## 2025-03-20 NOTE — PROGRESS NOTES
"    Cardiac Electrophysiology Office Visit     Referred by Nilson Wall MD for   Chief Complaint   Patient presents with    Follow-up     6 month follow up     HPI:  Graciela Norman is a 79 y.o. year old female patient with h/o Colon CA s/p Surgery Colon CA resection/Chemo (2016), HTN, HLD, DM (not on insulin), Obesity, Atrial fibrillation presenting today for follow up    Objective  Current Outpatient Medications   Medication Instructions    acetaminophen (TYLENOL) 1,000 mg, Nightly PRN    blood pressure monitor kit 1 each, miscellaneous, Daily    cholecalciferol (VITAMIN D-3) 1,000 Units, Nightly    cyanocobalamin (Vitamin B-12) 1,000 mcg tablet 1 tablet, Daily    Eliquis 5 mg, oral, 2 times daily    Jardiance 10 mg, oral, Daily    losartan (COZAAR) 25 mg, oral, Daily    metFORMIN (GLUCOPHAGE) 500 mg, oral, Daily    metoprolol tartrate (LOPRESSOR) 25 mg, oral, 2 times daily    spironolactone (ALDACTONE) 25 mg, oral, Daily         Visit Vitals  /76   Pulse 69   Ht 1.575 m (5' 2\")   Wt 96.3 kg (212 lb 6.4 oz)   SpO2 96%   BMI 38.85 kg/m²   OB Status Postmenopausal   Smoking Status Former   BSA 2.05 m²      Physical Exam  Vitals reviewed.   Constitutional:       Appearance: Normal appearance.   HENT:      Head: Normocephalic.   Cardiovascular:      Rate and Rhythm: Normal rate and regular rhythm.   Pulmonary:      Effort: Pulmonary effort is normal. No respiratory distress.      Breath sounds: No wheezing.   Skin:     General: Skin is warm and dry.      Capillary Refill: Capillary refill takes less than 2 seconds.   Neurological:      Mental Status: She is alert.   Psychiatric:         Mood and Affect: Mood normal.      My Interpretation of Reviewed Study(s):  Echo (Jan 2024): Reduced LV function with an EF of 35% with global hypokinesis.  Severely dilated left atrium moderate right atrium.  Moderate MR.  No pericardial effusion noted.  Nuclear stress test (January 2024): No definite evidence of " ischemia or prior infarct.  Echo (May 2024): Normal left ventricular systolic function with an EF of 60% no regional wall motion abnormalities.  Normal biatrial size.  No pericardial effusion  [id number] Score  Age >= 75: 2   Sex Female: 1   CHF History Yes: 1   HTN Yes: 1   Stroke/TIA/Thromboembolism No: 0   Vascular Dz: CAD/PAD/Aortic Plaque No: 0   DM Yes: 1   Total Score 6     Assessment/Plan   #Persistent atrial fibrillation s/p RFA (PVI Aug 2024)  AF Dx History: Seen on ECG since 2021; h/o Cardioversion: No  AAD Use: Amiodarone 200mg (stopped due to post ablation); Anticoagulation use: Apixaban 5mg BID (current); h/o Ablation: None; [id number] Score: 6.  Patient underwent cardioversion on February 2024.  c/w AC: Apixaban 5mg BID  c/w Metoprolol tart 25mg BID    #PAC  Patient appears to have frequent PACs on her EKG.  She says sometimes she can feel the palpitations when she is taking her pulse but otherwise asymptomatic.  Denies any palpitations or fluttering sensations during activity or rest otherwise.  PAC appear to be likely coming from one of the pulmonary veins.  We talked about the risk that these PACs are likely the trigger for atrial fibrillation and if patient has further atrial fibrillation or PAC burden gets higher then we will consider redo ablation or antiarrhythmic therapy.  Since patient's LV function is improved flecainide might be a reasonable option versus Tikosyn  Continue to monitor  If increased frequency or recurrence of atrial fibrillation then plan for AAD (flecainide versus Tikosyn) versus redo ablation    #HFrEF - Etiology unknown - now recovered  Likely related to tachycardia from atrial fibrillation.  No indication for ICD    Return to Clinic: Patient should return to the EP Clinic in 6 months    Nilson Woods MD [facility]  Cardiac Electrophysiology  [email or web address]    **Disclaimer: This note was dictated by speech recognition, and every effort has been made to  prevent any error in transcription, however minor errors may be present**

## 2025-04-22 DIAGNOSIS — I48.0 PAROXYSMAL ATRIAL FIBRILLATION (MULTI): ICD-10-CM

## 2025-04-22 RX ORDER — METOPROLOL TARTRATE 25 MG/1
25 TABLET, FILM COATED ORAL 2 TIMES DAILY
Qty: 180 TABLET | Refills: 1 | Status: SHIPPED | OUTPATIENT
Start: 2025-04-22

## 2025-05-08 ENCOUNTER — LAB (OUTPATIENT)
Dept: LAB | Facility: HOSPITAL | Age: 80
End: 2025-05-08
Payer: MEDICARE

## 2025-05-08 DIAGNOSIS — E11.42 TYPE 2 DIABETES MELLITUS WITH DIABETIC POLYNEUROPATHY, WITHOUT LONG-TERM CURRENT USE OF INSULIN: ICD-10-CM

## 2025-05-08 LAB
ALBUMIN SERPL BCP-MCNC: 4.2 G/DL (ref 3.4–5)
ALP SERPL-CCNC: 64 U/L (ref 33–136)
ALT SERPL W P-5'-P-CCNC: 17 U/L (ref 7–45)
ANION GAP SERPL CALC-SCNC: 14 MMOL/L (ref 10–20)
AST SERPL W P-5'-P-CCNC: 15 U/L (ref 9–39)
BACTERIA #/AREA URNS AUTO: ABNORMAL /HPF
BASOPHILS # BLD AUTO: 0.08 X10*3/UL (ref 0–0.1)
BASOPHILS NFR BLD AUTO: 1.2 %
BILIRUB SERPL-MCNC: 1.3 MG/DL (ref 0–1.2)
BUN SERPL-MCNC: 16 MG/DL (ref 6–23)
CALCIUM SERPL-MCNC: 9.3 MG/DL (ref 8.6–10.3)
CHLORIDE SERPL-SCNC: 104 MMOL/L (ref 98–107)
CHOLEST SERPL-MCNC: 239 MG/DL (ref 0–199)
CHOLESTEROL/HDL RATIO: 3.5
CO2 SERPL-SCNC: 23 MMOL/L (ref 21–32)
CREAT SERPL-MCNC: 1.03 MG/DL (ref 0.5–1.05)
EGFRCR SERPLBLD CKD-EPI 2021: 56 ML/MIN/1.73M*2
EOSINOPHIL # BLD AUTO: 0.19 X10*3/UL (ref 0–0.4)
EOSINOPHIL NFR BLD AUTO: 2.9 %
ERYTHROCYTE [DISTWIDTH] IN BLOOD BY AUTOMATED COUNT: 13.2 % (ref 11.5–14.5)
GLUCOSE SERPL-MCNC: 126 MG/DL (ref 74–99)
HCT VFR BLD AUTO: 51 % (ref 36–46)
HDLC SERPL-MCNC: 68 MG/DL
HGB BLD-MCNC: 16.4 G/DL (ref 12–16)
IMM GRANULOCYTES # BLD AUTO: 0.02 X10*3/UL (ref 0–0.5)
IMM GRANULOCYTES NFR BLD AUTO: 0.3 % (ref 0–0.9)
LDLC SERPL CALC-MCNC: 142 MG/DL
LYMPHOCYTES # BLD AUTO: 2.55 X10*3/UL (ref 0.8–3)
LYMPHOCYTES NFR BLD AUTO: 38.3 %
MCH RBC QN AUTO: 32.3 PG (ref 26–34)
MCHC RBC AUTO-ENTMCNC: 32.2 G/DL (ref 32–36)
MCV RBC AUTO: 101 FL (ref 80–100)
MONOCYTES # BLD AUTO: 0.63 X10*3/UL (ref 0.05–0.8)
MONOCYTES NFR BLD AUTO: 9.5 %
MUCOUS THREADS #/AREA URNS AUTO: ABNORMAL /LPF
NEUTROPHILS # BLD AUTO: 3.18 X10*3/UL (ref 1.6–5.5)
NEUTROPHILS NFR BLD AUTO: 47.8 %
NON HDL CHOLESTEROL: 171 MG/DL (ref 0–149)
NRBC BLD-RTO: 0 /100 WBCS (ref 0–0)
PLATELET # BLD AUTO: 238 X10*3/UL (ref 150–450)
POTASSIUM SERPL-SCNC: 4.7 MMOL/L (ref 3.5–5.3)
PROT SERPL-MCNC: 6.7 G/DL (ref 6.4–8.2)
RBC # BLD AUTO: 5.07 X10*6/UL (ref 4–5.2)
RBC #/AREA URNS AUTO: ABNORMAL /HPF
SODIUM SERPL-SCNC: 136 MMOL/L (ref 136–145)
SQUAMOUS #/AREA URNS AUTO: ABNORMAL /HPF
TRIGL SERPL-MCNC: 144 MG/DL (ref 0–149)
TSH SERPL-ACNC: 2.86 MIU/L (ref 0.44–3.98)
VLDL: 29 MG/DL (ref 0–40)
WBC # BLD AUTO: 6.7 X10*3/UL (ref 4.4–11.3)
WBC #/AREA URNS AUTO: ABNORMAL /HPF

## 2025-05-08 PROCEDURE — 83036 HEMOGLOBIN GLYCOSYLATED A1C: CPT

## 2025-05-08 PROCEDURE — 80053 COMPREHEN METABOLIC PANEL: CPT

## 2025-05-08 PROCEDURE — 81001 URINALYSIS AUTO W/SCOPE: CPT

## 2025-05-08 PROCEDURE — 85025 COMPLETE CBC W/AUTO DIFF WBC: CPT

## 2025-05-08 PROCEDURE — 82607 VITAMIN B-12: CPT

## 2025-05-08 PROCEDURE — 84443 ASSAY THYROID STIM HORMONE: CPT

## 2025-05-08 PROCEDURE — 80061 LIPID PANEL: CPT

## 2025-05-09 LAB
ALBUMIN/CREAT UR: 21 MG/G CREAT
CREAT UR-MCNC: 87 MG/DL (ref 20–275)
EST. AVERAGE GLUCOSE BLD GHB EST-MCNC: 146 MG/DL
HBA1C MFR BLD: 6.7 % (ref ?–5.7)
MICROALBUMIN UR-MCNC: 1.8 MG/DL
VIT B12 SERPL-MCNC: 812 PG/ML (ref 211–911)

## 2025-05-14 ENCOUNTER — APPOINTMENT (OUTPATIENT)
Age: 80
End: 2025-05-14
Payer: MEDICARE

## 2025-05-14 VITALS
SYSTOLIC BLOOD PRESSURE: 138 MMHG | HEIGHT: 62 IN | WEIGHT: 210 LBS | DIASTOLIC BLOOD PRESSURE: 84 MMHG | HEART RATE: 68 BPM | BODY MASS INDEX: 38.64 KG/M2

## 2025-05-14 DIAGNOSIS — C18.2 MALIGNANT NEOPLASM OF ASCENDING COLON (MULTI): ICD-10-CM

## 2025-05-14 DIAGNOSIS — G89.29 CHRONIC PAIN OF BOTH KNEES: ICD-10-CM

## 2025-05-14 DIAGNOSIS — E11.42 TYPE 2 DIABETES MELLITUS WITH DIABETIC POLYNEUROPATHY, WITHOUT LONG-TERM CURRENT USE OF INSULIN: ICD-10-CM

## 2025-05-14 DIAGNOSIS — Z00.00 ROUTINE GENERAL MEDICAL EXAMINATION AT HEALTH CARE FACILITY: Primary | ICD-10-CM

## 2025-05-14 DIAGNOSIS — M25.562 CHRONIC PAIN OF BOTH KNEES: ICD-10-CM

## 2025-05-14 DIAGNOSIS — M25.561 CHRONIC PAIN OF BOTH KNEES: ICD-10-CM

## 2025-05-14 PROCEDURE — G0439 PPPS, SUBSEQ VISIT: HCPCS | Performed by: FAMILY MEDICINE

## 2025-05-14 PROCEDURE — 1160F RVW MEDS BY RX/DR IN RCRD: CPT | Performed by: FAMILY MEDICINE

## 2025-05-14 PROCEDURE — G2211 COMPLEX E/M VISIT ADD ON: HCPCS | Performed by: FAMILY MEDICINE

## 2025-05-14 PROCEDURE — 1170F FXNL STATUS ASSESSED: CPT | Performed by: FAMILY MEDICINE

## 2025-05-14 PROCEDURE — 1036F TOBACCO NON-USER: CPT | Performed by: FAMILY MEDICINE

## 2025-05-14 PROCEDURE — 3075F SYST BP GE 130 - 139MM HG: CPT | Performed by: FAMILY MEDICINE

## 2025-05-14 PROCEDURE — 99214 OFFICE O/P EST MOD 30 MIN: CPT | Performed by: FAMILY MEDICINE

## 2025-05-14 PROCEDURE — 3079F DIAST BP 80-89 MM HG: CPT | Performed by: FAMILY MEDICINE

## 2025-05-14 PROCEDURE — 1124F ACP DISCUSS-NO DSCNMKR DOCD: CPT | Performed by: FAMILY MEDICINE

## 2025-05-14 PROCEDURE — 1159F MED LIST DOCD IN RCRD: CPT | Performed by: FAMILY MEDICINE

## 2025-05-14 ASSESSMENT — ACTIVITIES OF DAILY LIVING (ADL)
TAKING_MEDICATION: INDEPENDENT
DRESSING: INDEPENDENT
MANAGING_FINANCES: INDEPENDENT
DOING_HOUSEWORK: INDEPENDENT
BATHING: INDEPENDENT
GROCERY_SHOPPING: INDEPENDENT

## 2025-05-14 ASSESSMENT — PATIENT HEALTH QUESTIONNAIRE - PHQ9
1. LITTLE INTEREST OR PLEASURE IN DOING THINGS: NOT AT ALL
SUM OF ALL RESPONSES TO PHQ9 QUESTIONS 1 AND 2: 0
2. FEELING DOWN, DEPRESSED OR HOPELESS: NOT AT ALL

## 2025-05-14 NOTE — PROGRESS NOTES
"Subjective   Reason for Visit: Graciela Norman is an 80 y.o. female here for a Medicare Wellness visit.     Past Medical, Surgical, and Family History reviewed and updated in chart.    Reviewed all medications by prescribing practitioner or clinical pharmacist (such as prescriptions, OTCs, herbal therapies and supplements) and documented in the medical record.    HPI  Fell on ice in January, hurt her knees.  Taking tylenol. Ice helps, requests to see Dr Reed  States not sleeping great, spouse has some health issues and increased stress  Dm, well controlled  Hyperlipidemia, declines statin due to previous side effects  Note mild elevation h/h, last few labs, she's going to discuss with hem/onc at upcoming appt., history of colon cancer  Patient Care Team:  Becky Gusman MD as PCP - General (Family Medicine)  Becky Gusman MD as PCP - [facility]ributed [facility]  Stefan Trotter MD as Consulting Physician (Hematology and Oncology)  Lizzie CastellanosD as Pharmacist (Pharmacy)     Review of Systems   All other systems reviewed and are negative.      Objective   Vitals:  /84 (BP Location: Left arm, Patient Position: Sitting)   Pulse 68   Ht 1.575 m (5' 2\")   Wt 95.3 kg (210 lb)   BMI 38.41 kg/m²       Physical Exam  Vitals and nursing note reviewed.   Constitutional:       General: She is not in acute distress.     Appearance: Normal appearance. She is not toxic-appearing.   HENT:      Head: Normocephalic and atraumatic.   Cardiovascular:      Rate and Rhythm: Normal rate and regular rhythm.      Heart sounds: No murmur heard.  Pulmonary:      Effort: Pulmonary effort is normal.      Breath sounds: Normal breath sounds.   Musculoskeletal:      Cervical back: Neck supple. No rigidity.      Comments:     Skin:     General: Skin is warm and dry.   Neurological:      General: No focal deficit present.      Mental Status: She is alert and oriented to person, place, and time.   Psychiatric:         Mood " and Affect: Mood normal.         Behavior: Behavior normal.       Lab on 05/08/2025   Component Date Value Ref Range Status    WBC 05/08/2025 6.7  4.4 - 11.3 x10*3/uL Final    nRBC 05/08/2025 0.0  0.0 - 0.0 /100 WBCs Final    RBC 05/08/2025 5.07  4.00 - 5.20 x10*6/uL Final    Hemoglobin 05/08/2025 16.4 (H)  12.0 - 16.0 g/dL Final    Hematocrit 05/08/2025 51.0 (H)  36.0 - 46.0 % Final    MCV 05/08/2025 101 (H)  80 - 100 fL Final    MCH 05/08/2025 32.3  26.0 - 34.0 pg Final    MCHC 05/08/2025 32.2  32.0 - 36.0 g/dL Final    RDW 05/08/2025 13.2  11.5 - 14.5 % Final    Platelets 05/08/2025 238  150 - 450 x10*3/uL Final    Neutrophils % 05/08/2025 47.8  40.0 - 80.0 % Final    Immature Granulocytes %, Automated 05/08/2025 0.3  0.0 - 0.9 % Final    Immature Granulocyte Count (IG) includes promyelocytes, myelocytes and metamyelocytes but does not include bands. Percent differential counts (%) should be interpreted in the context of the absolute cell counts (cells/UL).    Lymphocytes % 05/08/2025 38.3  13.0 - 44.0 % Final    Monocytes % 05/08/2025 9.5  2.0 - 10.0 % Final    Eosinophils % 05/08/2025 2.9  0.0 - 6.0 % Final    Basophils % 05/08/2025 1.2  0.0 - 2.0 % Final    Neutrophils Absolute 05/08/2025 3.18  1.60 - 5.50 x10*3/uL Final    Percent differential counts (%) should be interpreted in the context of the absolute cell counts (cells/uL).    Immature Granulocytes Absolute, Au* 05/08/2025 0.02  0.00 - 0.50 x10*3/uL Final    Lymphocytes Absolute 05/08/2025 2.55  0.80 - 3.00 x10*3/uL Final    Monocytes Absolute 05/08/2025 0.63  0.05 - 0.80 x10*3/uL Final    Eosinophils Absolute 05/08/2025 0.19  0.00 - 0.40 x10*3/uL Final    Basophils Absolute 05/08/2025 0.08  0.00 - 0.10 x10*3/uL Final    Glucose 05/08/2025 126 (H)  74 - 99 mg/dL Final    Sodium 05/08/2025 136  136 - 145 mmol/L Final    Potassium 05/08/2025 4.7  3.5 - 5.3 mmol/L Final    Chloride 05/08/2025 104  98 - 107 mmol/L Final    Bicarbonate 05/08/2025 23  21 - 32  mmol/L Final    Anion Gap 05/08/2025 14  10 - 20 mmol/L Final    Urea Nitrogen 05/08/2025 16  6 - 23 mg/dL Final    Creatinine 05/08/2025 1.03  0.50 - 1.05 mg/dL Final    eGFR 05/08/2025 56 (L)  >60 mL/min/1.73m*2 Final    Calculations of estimated GFR are performed using the 2021 CKD-EPI Study Refit equation without the race variable for the IDMS-Traceable creatinine methods.  https://jasn.asnjournals.org/content/early/2021/09/22/ASN.5284047146    Calcium 05/08/2025 9.3  8.6 - 10.3 mg/dL Final    Albumin 05/08/2025 4.2  3.4 - 5.0 g/dL Final    Alkaline Phosphatase 05/08/2025 64  33 - 136 U/L Final    Total Protein 05/08/2025 6.7  6.4 - 8.2 g/dL Final    AST 05/08/2025 15  9 - 39 U/L Final    Bilirubin, Total 05/08/2025 1.3 (H)  0.0 - 1.2 mg/dL Final    ALT 05/08/2025 17  7 - 45 U/L Final    Patients treated with Sulfasalazine may generate falsely decreased results for ALT.    Cholesterol 05/08/2025 239 (H)  0 - 199 mg/dL Final          Age      Desirable   Borderline High   High     0-19 Y     0 - 169       170 - 199     >/= 200    20-24 Y     0 - 189       190 - 224     >/= 225         >24 Y     0 - 199       200 - 239     >/= 240   **All ranges are based on fasting samples. Specific   therapeutic targets will vary based on patient-specific   cardiac risk.    Pediatric guidelines reference:Pediatrics 2011, 128(S5).Adult guidelines reference: NCEP ATPIII Guidelines,DINA 2001, 258:2486-97    Venipuncture immediately after or during the administration of Metamizole may lead to falsely low results. Testing should be performed immediately prior to Metamizole dosing.    HDL-Cholesterol 05/08/2025 68.0  mg/dL Final      Age       Very Low   Low     Normal    High    0-19 Y    < 35      < 40     40-45     ----  20-24 Y    ----     < 40      >45      ----        >24 Y      ----     < 40     40-60      >60      Cholesterol/HDL Ratio 05/08/2025 3.5   Final      Ref Values  Desirable  < 3.4  High Risk  > 5.0    LDL Calculated  05/08/2025 142 (H)  <=99 mg/dL Final                                Near   Borderline      AGE      Desirable  Optimal    High     High     Very High     0-19 Y     0 - 109     ---    110-129   >/= 130     ----    20-24 Y     0 - 119     ---    120-159   >/= 160     ----      >24 Y     0 -  99   100-129  130-159   160-189     >/=190      VLDL 05/08/2025 29  0 - 40 mg/dL Final    Triglycerides 05/08/2025 144  0 - 149 mg/dL Final    Age              Desirable        Borderline         High        Very High  SEX:B           mg/dL             mg/dL               mg/dL      mg/dL  <=14D                       ----               ----        ----  15D-365D                    ----               ----        ----  1Y-9Y           0-74               75-99             >=100       ----  10Y-19Y        0-89                            >=130       ----  20Y-24Y        0-114             115-149             >=150      ----  >= 25Y         0-149             150-199             200-499    >=500      Venipuncture immediately after or during the administration of Metamizole may lead to falsely low results. Testing should be performed immediately prior to Metamizole dosing.    Non HDL Cholesterol 05/08/2025 171 (H)  0 - 149 mg/dL Final          Age       Desirable   Borderline High   High     Very High     0-19 Y     0 - 119       120 - 144     >/= 145    >/= 160    20-24 Y     0 - 149       150 - 189     >/= 190      ----         >24 Y    30 mg/dL above LDL Cholesterol goal      Thyroid Stimulating Hormone 05/08/2025 2.86  0.44 - 3.98 mIU/L Final    Vitamin B12 05/08/2025 812  211 - 911 pg/mL Final    Hemoglobin A1C 05/08/2025 6.7 (H)  See comment % Final    Estimated Average Glucose 05/08/2025 146  Not Established mg/dL Final    WBC, Urine 05/08/2025 1-5  1-5, NONE /HPF Final    RBC, Urine 05/08/2025 1-2  NONE, 1-2, 3-5 /HPF Final    Squamous Epithelial Cells, Urine 05/08/2025 1-9 (SPARSE)  Reference range not  established. /HPF Final    Bacteria, Urine 05/08/2025 1+ (A)  NONE SEEN /HPF Final    Mucus, Urine 05/08/2025 FEW  Reference range not established. /LPF Final   Follow up six months, labs prior    Assessment & Plan  Routine general medical examination at health care facility         Type 2 diabetes mellitus with diabetic polyneuropathy, without long-term current use of insulin    Orders:    Comprehensive Metabolic Panel; Future    Hemoglobin A1C; Future    Malignant neoplasm of ascending colon (Multi)         Chronic pain of both knees    Orders:    Referral to Orthopedics and Sports Medicine; Future            Patient was identified as a fall risk. Risk prevention instructions provided.

## 2025-05-21 DIAGNOSIS — I50.32 HEART FAILURE WITH RECOVERED EJECTION FRACTION (HFRECEF): Primary | ICD-10-CM

## 2025-05-21 DIAGNOSIS — I48.91 ATRIAL FIBRILLATION WITH RVR (MULTI): ICD-10-CM

## 2025-05-21 RX ORDER — SPIRONOLACTONE 25 MG/1
25 TABLET ORAL DAILY
Qty: 90 TABLET | Refills: 3 | Status: SHIPPED | OUTPATIENT
Start: 2025-05-21 | End: 2026-05-21

## 2025-05-29 ENCOUNTER — APPOINTMENT (OUTPATIENT)
Dept: PHARMACY | Facility: HOSPITAL | Age: 80
End: 2025-05-29
Payer: MEDICARE

## 2025-05-29 ENCOUNTER — DOCUMENTATION (OUTPATIENT)
Dept: CARDIOLOGY | Facility: CLINIC | Age: 80
End: 2025-05-29

## 2025-05-29 NOTE — PROGRESS NOTES
Preoperative Cardiovascular Examination and Risk Stratification  Jose Risk for Myocardia Infarction or Cardiac Arrest (EMMANUEL)  0.8% risk of myocardial infraction or cardiac arrest, intraoperatively or up to 30 days post-op.  Low risk: major adverse cardiac event risk <1%  High risk: major adverse cardiac event risk >1%  - She has history of nonischemic cardiopathy with most recent LVEF of 35%.  Myocardial SPECT January 2024 negative for ischemia.  Cardiopathy likely secondary to atrial fibrillation.  She is low cardiac risk for low risk orthopedic surgery and okay to proceed without any additional outpatient cardiology testing indicated.  Recommend holding direct oral anticoagulation for 8 hours prior to surgery and resume as soon as possible when safe from a surgical standpoint.

## 2025-06-12 PROCEDURE — RXMED WILLOW AMBULATORY MEDICATION CHARGE

## 2025-06-13 ENCOUNTER — PHARMACY VISIT (OUTPATIENT)
Dept: PHARMACY | Facility: CLINIC | Age: 80
End: 2025-06-13
Payer: COMMERCIAL

## 2025-06-13 ENCOUNTER — LAB (OUTPATIENT)
Dept: LAB | Facility: HOSPITAL | Age: 80
End: 2025-06-13
Payer: MEDICARE

## 2025-06-13 DIAGNOSIS — C18.2 MALIGNANT NEOPLASM OF ASCENDING COLON (MULTI): ICD-10-CM

## 2025-06-13 LAB
ALBUMIN SERPL BCP-MCNC: 4.2 G/DL (ref 3.4–5)
ALP SERPL-CCNC: 74 U/L (ref 33–136)
ALT SERPL W P-5'-P-CCNC: 15 U/L (ref 7–45)
ANION GAP SERPL CALC-SCNC: 14 MMOL/L (ref 10–20)
AST SERPL W P-5'-P-CCNC: 14 U/L (ref 9–39)
BASOPHILS # BLD AUTO: 0.08 X10*3/UL (ref 0–0.1)
BASOPHILS NFR BLD AUTO: 1.2 %
BILIRUB SERPL-MCNC: 1.6 MG/DL (ref 0–1.2)
BUN SERPL-MCNC: 19 MG/DL (ref 6–23)
CALCIUM SERPL-MCNC: 9.8 MG/DL (ref 8.6–10.3)
CHLORIDE SERPL-SCNC: 102 MMOL/L (ref 98–107)
CO2 SERPL-SCNC: 26 MMOL/L (ref 21–32)
CREAT SERPL-MCNC: 1.07 MG/DL (ref 0.5–1.05)
EGFRCR SERPLBLD CKD-EPI 2021: 53 ML/MIN/1.73M*2
EOSINOPHIL # BLD AUTO: 0.3 X10*3/UL (ref 0–0.4)
EOSINOPHIL NFR BLD AUTO: 4.4 %
ERYTHROCYTE [DISTWIDTH] IN BLOOD BY AUTOMATED COUNT: 12.8 % (ref 11.5–14.5)
GLUCOSE SERPL-MCNC: 147 MG/DL (ref 74–99)
HCT VFR BLD AUTO: 52.7 % (ref 36–46)
HGB BLD-MCNC: 16.8 G/DL (ref 12–16)
IMM GRANULOCYTES # BLD AUTO: 0.03 X10*3/UL (ref 0–0.5)
IMM GRANULOCYTES NFR BLD AUTO: 0.4 % (ref 0–0.9)
LYMPHOCYTES # BLD AUTO: 2.65 X10*3/UL (ref 0.8–3)
LYMPHOCYTES NFR BLD AUTO: 38.9 %
MCH RBC QN AUTO: 31.8 PG (ref 26–34)
MCHC RBC AUTO-ENTMCNC: 31.9 G/DL (ref 32–36)
MCV RBC AUTO: 100 FL (ref 80–100)
MONOCYTES # BLD AUTO: 0.59 X10*3/UL (ref 0.05–0.8)
MONOCYTES NFR BLD AUTO: 8.7 %
NEUTROPHILS # BLD AUTO: 3.16 X10*3/UL (ref 1.6–5.5)
NEUTROPHILS NFR BLD AUTO: 46.4 %
NRBC BLD-RTO: 0 /100 WBCS (ref 0–0)
PLATELET # BLD AUTO: 259 X10*3/UL (ref 150–450)
POTASSIUM SERPL-SCNC: 4.5 MMOL/L (ref 3.5–5.3)
PROT SERPL-MCNC: 7.1 G/DL (ref 6.4–8.2)
RBC # BLD AUTO: 5.29 X10*6/UL (ref 4–5.2)
SODIUM SERPL-SCNC: 137 MMOL/L (ref 136–145)
WBC # BLD AUTO: 6.8 X10*3/UL (ref 4.4–11.3)

## 2025-06-13 PROCEDURE — 85025 COMPLETE CBC W/AUTO DIFF WBC: CPT

## 2025-06-13 PROCEDURE — 80053 COMPREHEN METABOLIC PANEL: CPT

## 2025-06-13 PROCEDURE — 82378 CARCINOEMBRYONIC ANTIGEN: CPT

## 2025-06-14 LAB — CEA SERPL-MCNC: 2.3 UG/L

## 2025-06-17 ENCOUNTER — OFFICE VISIT (OUTPATIENT)
Dept: HEMATOLOGY/ONCOLOGY | Facility: CLINIC | Age: 80
End: 2025-06-17
Payer: MEDICARE

## 2025-06-17 VITALS
HEART RATE: 67 BPM | SYSTOLIC BLOOD PRESSURE: 154 MMHG | OXYGEN SATURATION: 93 % | WEIGHT: 212.19 LBS | RESPIRATION RATE: 16 BRPM | BODY MASS INDEX: 38.81 KG/M2 | DIASTOLIC BLOOD PRESSURE: 71 MMHG | TEMPERATURE: 97.9 F

## 2025-06-17 DIAGNOSIS — C18.2 MALIGNANT NEOPLASM OF ASCENDING COLON (MULTI): ICD-10-CM

## 2025-06-17 DIAGNOSIS — G47.30 SEVERE SLEEP APNEA: Primary | ICD-10-CM

## 2025-06-17 DIAGNOSIS — D75.1 POLYCYTHEMIA: ICD-10-CM

## 2025-06-17 PROCEDURE — G2211 COMPLEX E/M VISIT ADD ON: HCPCS | Performed by: INTERNAL MEDICINE

## 2025-06-17 PROCEDURE — 3078F DIAST BP <80 MM HG: CPT | Performed by: INTERNAL MEDICINE

## 2025-06-17 PROCEDURE — 3077F SYST BP >= 140 MM HG: CPT | Performed by: INTERNAL MEDICINE

## 2025-06-17 PROCEDURE — 1159F MED LIST DOCD IN RCRD: CPT | Performed by: INTERNAL MEDICINE

## 2025-06-17 PROCEDURE — 99214 OFFICE O/P EST MOD 30 MIN: CPT | Performed by: INTERNAL MEDICINE

## 2025-06-17 PROCEDURE — 1126F AMNT PAIN NOTED NONE PRSNT: CPT | Performed by: INTERNAL MEDICINE

## 2025-06-17 ASSESSMENT — PAIN SCALES - GENERAL: PAINLEVEL_OUTOF10: 0-NO PAIN

## 2025-06-17 NOTE — PATIENT INSTRUCTIONS
ORDERS & PATIENT INSTRUCTIONS:        Patient to go for colonoscopy  RTC 1 yr  CBC, CMP, CEA, erythropoietin level JAK2 mutation

## 2025-06-17 NOTE — PROGRESS NOTES
Patient ID: Graciela Norman is a 80 y.o. female.  Referring Physician: Stefan Trotter MD  [address] [facility]  [place] [address]  Primary Care Provider: Becky Gusman MD    ORDERS & PATIENT INSTRUCTIONS:      Patient to go for colonoscopy  RTC 1 yr  CBC, CMP, CEA, erythropoietin level JAK2 mutation            ASSESSMENT, PROBLEM LIST, DECISION MAKING, PLAN.      Colon carcinoma diagnosed in spring 2016 after work-up of bowel obstruction underwent right hemicolectomy on 5 May 2016 and was found to have 4 cm well differentiated adenocarcinoma, MSI was stable, T3 N0 M0 disease although only lying lymph nodes were reviewed, there was also concern for radial margin.   Patient was treated with 6-month of adjuvant Xeloda as Oncotype was 26 and less than 12 lymph nodes were obtained at the time of surgery.     Treatment History    2016-7-25: Chemotherapy:  Capecitabine started  2017-1-9: Chemotherapy:  Completion of Capecitabine-- completing 6 months adjuvant therapy    Patient's genetic testing done on August 2022 was negative for Vo syndrome        INTERVAL HISTORY :     Patient returns today for follow-up on stage IIa colon carcinoma was treated with adjuvant Xeloda, she is now 9 years out, she has been doing well,   In  January 2024 she had paroxysmal atrial fibrillation requiring cardioversion.       she denies any headache fever cough chest pain shortness of breath abdominal pain urinary symptom, and is otherwise doing well.        PHYSICAL EXAMINATION:    General: Conscious, alert, oriented x3, not in acute distress.  HEENT:    No icterus.    Chest:Bilateral symmetrical,     CVS:  S1, S2.    Abdomen:  Soft, no palpable mass   Extremities: No clubbing, cyanosis,     Skin: No petechial rash.        ASSESSMENT & PLAN:  Patient with stage IIa colon carcinoma with MSI stable, less than 12 lymph nodes removed and presentation with obstruction so she received adjuvant Xeloda for 6-month,  who is now 9 years out, recommend no further work-up from oncology standpoint, continued watchful waiting,   Her last colonoscopy was in 2019, she was supposed to have a colonoscopy in August 2023 but was canceled and he has not rescheduled it yet  I have strongly recommended patient to go for colonoscopy which she will take care of it.  CEA is normal, monitor    Patient has long-term polycythemia, unclear etiology but most likely related to sleep apnea as patient is not using CPAP, I have strongly recommended patient to use CPAP which she will for next 6 months and will have a repeat lab work by primary care physician in 6 months if she continue to have persistent or worsening polycythemia, we can consider doing additional workup to rule out primary polycythemia which I doubt.    -Next visit I will do JAK2 mutation and erythropoietin level if hemoglobin does not improve.    I have offered her to be released from our office although patient feels uncomfortable and would like to come back in a year      2. Genetics  Patient's genetic testing done on August 2022 was negative for Vo syndrome          VITALS:   2.05 meters squared /71   Pulse 67   Temp 36.6 °C (97.9 °F)   Resp 16   Wt 96.2 kg (212 lb 3.1 oz)   SpO2 93%   BMI 38.81 kg/m²     LABS:    CBC:  Recent Labs     06/13/25  1616 05/08/25  1150 08/05/24  0734 07/29/24  1159 06/13/24  1059   WBC 6.8 6.7 7.2 5.6 7.1   HGB 16.8* 16.4* 16.8* 17.0* 16.3*   HCT 52.7* 51.0* 48.8* 54.2* 51.7*    238 270 264 268    101* 97 102* 103*       CMP:  Recent Labs     06/13/25  1616 05/08/25  1150 11/07/24  1122 07/29/24  1159 06/13/24  1059 01/03/24  0552 01/02/24  1311    136 138 135* 134*   < > 137   K 4.5 4.7 4.9 4.8 4.4   < > 3.8    104 101 100 101   < > 102   CO2 26 23 30 27 24   < > 23   ANIONGAP 14 14 12 13 13   < > 16   BUN 19 16 19 18 23   < > 14   CREATININE 1.07* 1.03 1.20* 1.23* 1.11*   < > 0.92   EGFR 53* 56* 47* 45* 51*   < >  "65   MG  --   --   --   --   --   --  1.81    < > = values in this interval not displayed.     Recent Labs     06/13/25  1616 05/08/25  1150 11/07/24  1122 06/13/24  1059 05/08/24  1059   ALBUMIN 4.2 4.2 4.2 4.3 4.1   ALKPHOS 74 64 74 81 69   ALT 15 17 19 20 19   AST 14 15 16 18 17   BILITOT 1.6* 1.3* 1.3* 0.8 1.2       HEME/ENDO:  Recent Labs     05/08/25  1150 05/08/24  1059 01/02/24  1311 08/11/23  1200 03/09/23  1141 02/07/23  1150   TSH 2.86 3.20 2.03 1.98  --   --    [id number] 812  --   --  996* 798 170*        MICRO: No results for input(s): \"ESR\", \"CRP\", \"PROCAL\" in the last 80377 hours.  No results found for the last 90 days.        TUMOR MARKERS:  Carcinoembryonic AG (ug/L)   Date Value   06/13/2025 2.3   06/13/2024 2.5                IMAGING:         ECG 12 lead (Clinic Performed)  Sinus rhythm with PACs HR of 72bpm, QRS 64ms, QT 368ms and [id number]    *Please refer to scanned ECG for final report*       Current Outpatient Medications   Medication Sig Dispense Refill    acetaminophen (Tylenol) 500 mg tablet Take 2 tablets (1,000 mg) by mouth as needed at bedtime.      apixaban (Eliquis) 5 mg tablet Take 1 tablet (5 mg) by mouth 2 times a day. 180 tablet 3    blood pressure monitor kit 1 each once daily. 1 kit 0    cholecalciferol (Vitamin D-3) 25 MCG (1000 UT) tablet Take 1 tablet (1,000 Units) by mouth once daily at bedtime.      cyanocobalamin (Vitamin B-12) 1,000 mcg tablet Take 1 tablet (1,000 mcg) by mouth once daily.      empagliflozin (Jardiance) 10 mg tablet Take 1 tablet (10 mg) by mouth once daily. 90 tablet 3    losartan (Cozaar) 25 mg tablet Take 1 tablet (25 mg) by mouth once daily. 90 tablet 3    metFORMIN (Glucophage) 500 mg tablet TAKE 1 TABLET BY MOUTH EVERY DAY 90 tablet 1    metoprolol tartrate (Lopressor) 25 mg tablet Take 1 tablet (25 mg) by mouth 2 times a day. 180 tablet 1    spironolactone (Aldactone) 25 mg tablet Take 1 tablet (25 mg) by mouth once daily. 90 tablet 3     No " current facility-administered medications for this visit.                  SOCIAL HISTORY:    reports that she does not currently use alcohol.   reports no history of drug use.,   Tobacco Use: Medium Risk (6/16/2025)    Received [facility]    Patient History     Smoking Tobacco Use: Former     Smokeless Tobacco Use: Never     Passive Exposure: Not on file       FAMILY HISTORY:  Family History   Problem Relation Name Age of Onset    No Known Problems Mother      No Known Problems Father      Stroke Other paternal uncle        ALLERGY:   Aspirin, Diphenhydramine, Iodinated contrast media, Penicillins, Shellfish derived, Macrobid [nitrofurantoin monohyd/m-cryst], Statins-hmg-coa reductase inhibitors, and Sulfa (sulfonamide antibiotics)          Former smoker  She works as a  but not employed at this time.          FAMILY HISTORY:   Mom with Breast in her 70's  Sister with leukemia/lymphoma in her 50's  MGM with colon cancer in her 60's   Rachid Merritt with uterine.  2 cousins with breast cancer  Sister with metastatic ? GYN cancer            Medication reviewed in e-chart  Patient is monitored for medication toxicity  labs reviewed and interpreted independently, X rays independently reviewed  Notes from other physicians involved in care were reviewed    Charting was completed using voice recognition technology and may include unintended errors.    TODD ROCHE MD, GEORGE.    Rene Vera Master Clinician in Hematology and Oncology  Medical Director, [facility] at [facility].  [place] office  Phone [phone]  Fax      [phone]  [facility] /[place].  Phone [phone]  Fax     [phone]

## 2025-06-17 NOTE — PROGRESS NOTES
Patient to go for colonoscopy.  Follow-up with labs prior in 1 year.  Patient goes to [facility] for labs week prior.  Call back instructions reviewed.  Patient verbalized understanding.

## 2025-07-17 ENCOUNTER — APPOINTMENT (OUTPATIENT)
Dept: PHARMACY | Facility: HOSPITAL | Age: 80
End: 2025-07-17
Payer: MEDICARE

## 2025-09-18 ENCOUNTER — APPOINTMENT (OUTPATIENT)
Dept: CARDIOLOGY | Facility: CLINIC | Age: 80
End: 2025-09-18
Payer: MEDICARE

## 2025-11-12 ENCOUNTER — APPOINTMENT (OUTPATIENT)
Age: 80
End: 2025-11-12
Payer: MEDICARE

## 2026-02-17 ENCOUNTER — APPOINTMENT (OUTPATIENT)
Dept: CARDIOLOGY | Facility: CLINIC | Age: 81
End: 2026-02-17
Payer: MEDICARE

## (undated) DEVICE — CATHETER, ADVISOR HD GRID MAPPING, SENSOR ENABLED

## (undated) DEVICE — ARTERIAL LINE KIT, 20GA X 12CM, CUSTOM

## (undated) DEVICE — INTRODUCER, SHEATH, ENGAGE, 9FR 25CM 0.035

## (undated) DEVICE — GUIDEWIRE, INQWIRE, 3MM J, .035 X 210CM, FIXED

## (undated) DEVICE — TUBING SET, COOL POINT, 2.6M, INDIVIDUAL

## (undated) DEVICE — SHEATH, PINNACLE, W/.038 GUIDEWIRE, 10 CM,  8FR INTRODUCER, 8FR DIA, 2.5 CM DIALATOR

## (undated) DEVICE — POSITIONER, RETENTION SYSTEM, PANNUS, 2 PADS 1 STRAP, NS

## (undated) DEVICE — CATHETER, VIEW FLEX XTRA ICE, 9FR (REPROCESSED)

## (undated) DEVICE — DEVICE, CLOSURE, PERCLOSE, PROSTYLE

## (undated) DEVICE — ELECTRODE KIT, ENSITE X EP SYSTEM SURFACE

## (undated) DEVICE — ACCESS KIT, S-MAK MINI, 4FR 10CM 0.018IN 40CM, NT/PT, ECHO ENHANCE NEEDLE

## (undated) DEVICE — SHEATH, PINNACLE, W/.038 GUIDEWIRE, 10 CM,  7FR INTRODUCER, 7FR DIA, 2.5 CM DIALATOR

## (undated) DEVICE — PAD, GROUNDING, ELECTROSURGICAL, W/9 FT CABLE, POLYHESIVE II, ADULT, LF

## (undated) DEVICE — INTRODUCER, AGILIS,  MEDIUM CURL, 8F X 71CM, DUAL

## (undated) DEVICE — NEEDLE, TRANSSEPTAL, BRK CURVE, ADULT, 18G X 98CM

## (undated) DEVICE — CATHETER, TACTIFLEX, BI-D ABLATION, 8FR 2-2-2MM D-F CURVE

## (undated) DEVICE — GUIDEWIRE KIT, SAFESEPT TRANSSEPTAL, .014 X 135CM

## (undated) DEVICE — Device

## (undated) DEVICE — GUIDEWIRE, TORAY, .025 DIA, 230CM

## (undated) DEVICE — CATHETER KIT, RADIAL ARTLINE, 20G X 1 3/4

## (undated) DEVICE — NEEDLE, TRANSSEPTAL, BRK XS SERIES, 98CM

## (undated) DEVICE — ELECTRODE, MULTIFUNCTION, QUICK-COMBO, EDGE SYSTEM, 2 FT